# Patient Record
Sex: FEMALE | Race: WHITE | Employment: FULL TIME | URBAN - METROPOLITAN AREA
[De-identification: names, ages, dates, MRNs, and addresses within clinical notes are randomized per-mention and may not be internally consistent; named-entity substitution may affect disease eponyms.]

---

## 2017-03-10 ENCOUNTER — OFFICE VISIT (OUTPATIENT)
Dept: FAMILY MEDICINE CLINIC | Age: 42
End: 2017-03-10

## 2017-03-10 VITALS
HEIGHT: 66 IN | RESPIRATION RATE: 12 BRPM | WEIGHT: 170 LBS | SYSTOLIC BLOOD PRESSURE: 108 MMHG | BODY MASS INDEX: 27.32 KG/M2 | DIASTOLIC BLOOD PRESSURE: 74 MMHG | HEART RATE: 60 BPM

## 2017-03-10 DIAGNOSIS — F33.1 MODERATE EPISODE OF RECURRENT MAJOR DEPRESSIVE DISORDER (HCC): Primary | ICD-10-CM

## 2017-03-10 DIAGNOSIS — F41.9 ANXIETY: ICD-10-CM

## 2017-03-10 PROCEDURE — 99214 OFFICE O/P EST MOD 30 MIN: CPT | Performed by: FAMILY MEDICINE

## 2017-03-10 PROCEDURE — G0444 DEPRESSION SCREEN ANNUAL: HCPCS | Performed by: FAMILY MEDICINE

## 2017-03-10 RX ORDER — DIAZEPAM 2 MG/1
2 TABLET ORAL EVERY 8 HOURS PRN
Qty: 20 TABLET | Refills: 0 | Status: SHIPPED | OUTPATIENT
Start: 2017-03-10 | End: 2017-03-20

## 2017-03-10 ASSESSMENT — PATIENT HEALTH QUESTIONNAIRE - PHQ9
6. FEELING BAD ABOUT YOURSELF - OR THAT YOU ARE A FAILURE OR HAVE LET YOURSELF OR YOUR FAMILY DOWN: 3
8. MOVING OR SPEAKING SO SLOWLY THAT OTHER PEOPLE COULD HAVE NOTICED. OR THE OPPOSITE, BEING SO FIGETY OR RESTLESS THAT YOU HAVE BEEN MOVING AROUND A LOT MORE THAN USUAL: 3
10. IF YOU CHECKED OFF ANY PROBLEMS, HOW DIFFICULT HAVE THESE PROBLEMS MADE IT FOR YOU TO DO YOUR WORK, TAKE CARE OF THINGS AT HOME, OR GET ALONG WITH OTHER PEOPLE: 3
3. TROUBLE FALLING OR STAYING ASLEEP: 3
2. FEELING DOWN, DEPRESSED OR HOPELESS: 3
SUM OF ALL RESPONSES TO PHQ QUESTIONS 1-9: 25
7. TROUBLE CONCENTRATING ON THINGS, SUCH AS READING THE NEWSPAPER OR WATCHING TELEVISION: 3
5. POOR APPETITE OR OVEREATING: 3
4. FEELING TIRED OR HAVING LITTLE ENERGY: 3
SUM OF ALL RESPONSES TO PHQ9 QUESTIONS 1 & 2: 6
9. THOUGHTS THAT YOU WOULD BE BETTER OFF DEAD, OR OF HURTING YOURSELF: 1
1. LITTLE INTEREST OR PLEASURE IN DOING THINGS: 3

## 2017-04-10 ENCOUNTER — OFFICE VISIT (OUTPATIENT)
Dept: FAMILY MEDICINE CLINIC | Age: 42
End: 2017-04-10

## 2017-04-10 VITALS
HEIGHT: 66 IN | WEIGHT: 170 LBS | DIASTOLIC BLOOD PRESSURE: 66 MMHG | RESPIRATION RATE: 12 BRPM | HEART RATE: 70 BPM | SYSTOLIC BLOOD PRESSURE: 118 MMHG | BODY MASS INDEX: 27.32 KG/M2

## 2017-04-10 DIAGNOSIS — F33.1 MODERATE EPISODE OF RECURRENT MAJOR DEPRESSIVE DISORDER (HCC): ICD-10-CM

## 2017-04-10 DIAGNOSIS — F41.9 ANXIETY: ICD-10-CM

## 2017-04-10 PROCEDURE — 99213 OFFICE O/P EST LOW 20 MIN: CPT | Performed by: FAMILY MEDICINE

## 2017-05-22 ENCOUNTER — OFFICE VISIT (OUTPATIENT)
Dept: FAMILY MEDICINE CLINIC | Age: 42
End: 2017-05-22

## 2017-05-22 VITALS
RESPIRATION RATE: 16 BRPM | SYSTOLIC BLOOD PRESSURE: 108 MMHG | WEIGHT: 172 LBS | BODY MASS INDEX: 27.64 KG/M2 | HEIGHT: 66 IN | HEART RATE: 60 BPM | DIASTOLIC BLOOD PRESSURE: 52 MMHG

## 2017-05-22 DIAGNOSIS — N92.0 MENORRHAGIA WITH REGULAR CYCLE: ICD-10-CM

## 2017-05-22 DIAGNOSIS — Z01.818 PRE-OP EVALUATION: Primary | ICD-10-CM

## 2017-05-22 PROCEDURE — 99243 OFF/OP CNSLTJ NEW/EST LOW 30: CPT | Performed by: FAMILY MEDICINE

## 2017-05-22 PROCEDURE — 36415 COLL VENOUS BLD VENIPUNCTURE: CPT | Performed by: FAMILY MEDICINE

## 2017-05-23 ENCOUNTER — TELEPHONE (OUTPATIENT)
Dept: FAMILY MEDICINE CLINIC | Age: 42
End: 2017-05-23

## 2017-05-23 LAB
BASOPHILS ABSOLUTE: 0 K/UL (ref 0–0.2)
BASOPHILS RELATIVE PERCENT: 0.9 %
EOSINOPHILS ABSOLUTE: 0.2 K/UL (ref 0–0.6)
EOSINOPHILS RELATIVE PERCENT: 4.1 %
HCT VFR BLD CALC: 39.2 % (ref 36–48)
HEMOGLOBIN: 12.8 G/DL (ref 12–16)
LYMPHOCYTES ABSOLUTE: 2.1 K/UL (ref 1–5.1)
LYMPHOCYTES RELATIVE PERCENT: 36.1 %
MCH RBC QN AUTO: 30.1 PG (ref 26–34)
MCHC RBC AUTO-ENTMCNC: 32.8 G/DL (ref 31–36)
MCV RBC AUTO: 91.7 FL (ref 80–100)
MONOCYTES ABSOLUTE: 0.5 K/UL (ref 0–1.3)
MONOCYTES RELATIVE PERCENT: 8.1 %
NEUTROPHILS ABSOLUTE: 2.9 K/UL (ref 1.7–7.7)
NEUTROPHILS RELATIVE PERCENT: 50.8 %
PDW BLD-RTO: 13.7 % (ref 12.4–15.4)
PLATELET # BLD: 215 K/UL (ref 135–450)
PMV BLD AUTO: 9.9 FL (ref 5–10.5)
RBC # BLD: 4.27 M/UL (ref 4–5.2)
WBC # BLD: 5.7 K/UL (ref 4–11)

## 2017-06-23 DIAGNOSIS — F33.1 MODERATE EPISODE OF RECURRENT MAJOR DEPRESSIVE DISORDER (HCC): ICD-10-CM

## 2017-06-23 DIAGNOSIS — F41.9 ANXIETY: ICD-10-CM

## 2017-07-31 RX ORDER — MONTELUKAST SODIUM 10 MG/1
TABLET ORAL
Qty: 90 TABLET | Refills: 0 | Status: SHIPPED | OUTPATIENT
Start: 2017-07-31 | End: 2017-08-11 | Stop reason: SDUPTHER

## 2017-08-11 ENCOUNTER — OFFICE VISIT (OUTPATIENT)
Dept: FAMILY MEDICINE CLINIC | Age: 42
End: 2017-08-11

## 2017-08-11 VITALS
WEIGHT: 181 LBS | HEIGHT: 66 IN | BODY MASS INDEX: 29.09 KG/M2 | SYSTOLIC BLOOD PRESSURE: 100 MMHG | RESPIRATION RATE: 12 BRPM | DIASTOLIC BLOOD PRESSURE: 62 MMHG | HEART RATE: 66 BPM

## 2017-08-11 DIAGNOSIS — G47.33 OSA (OBSTRUCTIVE SLEEP APNEA): ICD-10-CM

## 2017-08-11 DIAGNOSIS — R63.5 WEIGHT GAIN: ICD-10-CM

## 2017-08-11 DIAGNOSIS — F41.9 ANXIETY: ICD-10-CM

## 2017-08-11 DIAGNOSIS — E66.3 OVER WEIGHT: Primary | ICD-10-CM

## 2017-08-11 PROCEDURE — 99214 OFFICE O/P EST MOD 30 MIN: CPT | Performed by: FAMILY MEDICINE

## 2017-08-11 RX ORDER — MONTELUKAST SODIUM 10 MG/1
TABLET ORAL
Qty: 90 TABLET | Refills: 1 | Status: SHIPPED | OUTPATIENT
Start: 2017-08-11 | End: 2018-01-08 | Stop reason: SDUPTHER

## 2017-08-16 ENCOUNTER — TELEPHONE (OUTPATIENT)
Dept: BARIATRICS/WEIGHT MGMT | Age: 42
End: 2017-08-16

## 2017-08-21 ENCOUNTER — TELEPHONE (OUTPATIENT)
Dept: SLEEP MEDICINE | Age: 42
End: 2017-08-21

## 2017-08-28 ENCOUNTER — OFFICE VISIT (OUTPATIENT)
Dept: SLEEP MEDICINE | Age: 42
End: 2017-08-28

## 2017-08-28 VITALS
SYSTOLIC BLOOD PRESSURE: 126 MMHG | WEIGHT: 189 LBS | BODY MASS INDEX: 30.37 KG/M2 | DIASTOLIC BLOOD PRESSURE: 80 MMHG | HEIGHT: 66 IN | OXYGEN SATURATION: 97 % | HEART RATE: 84 BPM

## 2017-08-28 DIAGNOSIS — G47.19 EXCESSIVE DAYTIME SLEEPINESS: Primary | ICD-10-CM

## 2017-08-28 DIAGNOSIS — G47.63 BRUXISM, SLEEP-RELATED: ICD-10-CM

## 2017-08-28 DIAGNOSIS — R06.83 SNORING: ICD-10-CM

## 2017-08-28 DIAGNOSIS — R06.89 GASPING FOR BREATH: ICD-10-CM

## 2017-08-28 PROCEDURE — 99204 OFFICE O/P NEW MOD 45 MIN: CPT | Performed by: PSYCHIATRY & NEUROLOGY

## 2017-08-28 ASSESSMENT — SLEEP AND FATIGUE QUESTIONNAIRES
HOW LIKELY ARE YOU TO NOD OFF OR FALL ASLEEP WHILE LYING DOWN TO REST IN THE AFTERNOON WHEN CIRCUMSTANCES PERMIT: 2
HOW LIKELY ARE YOU TO NOD OFF OR FALL ASLEEP WHILE SITTING INACTIVE IN A PUBLIC PLACE: 1
NECK CIRCUMFERENCE (INCHES): 13
HOW LIKELY ARE YOU TO NOD OFF OR FALL ASLEEP WHILE WATCHING TV: 3
HOW LIKELY ARE YOU TO NOD OFF OR FALL ASLEEP WHILE SITTING QUIETLY AFTER LUNCH WITHOUT ALCOHOL: 1
HOW LIKELY ARE YOU TO NOD OFF OR FALL ASLEEP WHILE SITTING AND READING: 3
HOW LIKELY ARE YOU TO NOD OFF OR FALL ASLEEP IN A CAR, WHILE STOPPED FOR A FEW MINUTES IN TRAFFIC: 0
HOW LIKELY ARE YOU TO NOD OFF OR FALL ASLEEP WHEN YOU ARE A PASSENGER IN A CAR FOR AN HOUR WITHOUT A BREAK: 2
ESS TOTAL SCORE: 13
HOW LIKELY ARE YOU TO NOD OFF OR FALL ASLEEP WHILE SITTING AND TALKING TO SOMEONE: 1

## 2017-08-28 ASSESSMENT — ENCOUNTER SYMPTOMS
ABDOMINAL DISTENTION: 1
COUGH: 1
SORE THROAT: 1
ALLERGIC/IMMUNOLOGIC NEGATIVE: 1
EYES NEGATIVE: 1
CHOKING: 1

## 2017-09-14 ENCOUNTER — OFFICE VISIT (OUTPATIENT)
Dept: FAMILY MEDICINE CLINIC | Age: 42
End: 2017-09-14

## 2017-09-14 VITALS
OXYGEN SATURATION: 98 % | DIASTOLIC BLOOD PRESSURE: 70 MMHG | SYSTOLIC BLOOD PRESSURE: 102 MMHG | RESPIRATION RATE: 12 BRPM | BODY MASS INDEX: 29.89 KG/M2 | HEART RATE: 77 BPM | HEIGHT: 66 IN | TEMPERATURE: 98.1 F | WEIGHT: 186 LBS

## 2017-09-14 DIAGNOSIS — R42 VERTIGO: ICD-10-CM

## 2017-09-14 DIAGNOSIS — J01.00 ACUTE MAXILLARY SINUSITIS, RECURRENCE NOT SPECIFIED: Primary | ICD-10-CM

## 2017-09-14 PROCEDURE — 99213 OFFICE O/P EST LOW 20 MIN: CPT | Performed by: INTERNAL MEDICINE

## 2017-09-14 RX ORDER — FLUTICASONE PROPIONATE 50 MCG
1 SPRAY, SUSPENSION (ML) NASAL DAILY
Qty: 1 BOTTLE | Refills: 3 | Status: SHIPPED | OUTPATIENT
Start: 2017-09-14 | End: 2018-11-20 | Stop reason: SDUPTHER

## 2017-09-14 RX ORDER — AZITHROMYCIN 250 MG/1
TABLET, FILM COATED ORAL
Qty: 1 PACKET | Refills: 0 | Status: SHIPPED | OUTPATIENT
Start: 2017-09-14 | End: 2017-09-24

## 2017-09-14 RX ORDER — ONDANSETRON 4 MG/1
4 TABLET, FILM COATED ORAL EVERY 8 HOURS PRN
Qty: 30 TABLET | Refills: 0 | Status: SHIPPED | OUTPATIENT
Start: 2017-09-14 | End: 2021-01-06

## 2017-10-04 ENCOUNTER — HOSPITAL ENCOUNTER (OUTPATIENT)
Dept: SLEEP MEDICINE | Age: 42
Discharge: HOME | End: 2017-10-06
Attending: PSYCHIATRY & NEUROLOGY | Admitting: PSYCHIATRY & NEUROLOGY

## 2017-10-04 DIAGNOSIS — R06.89 GASPING FOR BREATH: ICD-10-CM

## 2017-10-04 DIAGNOSIS — R06.83 SNORING: ICD-10-CM

## 2017-10-04 DIAGNOSIS — G47.19 EXCESSIVE DAYTIME SLEEPINESS: ICD-10-CM

## 2017-10-09 ENCOUNTER — TELEPHONE (OUTPATIENT)
Dept: PULMONOLOGY | Age: 42
End: 2017-10-09

## 2017-10-09 DIAGNOSIS — G47.19 EXCESSIVE DAYTIME SLEEPINESS: Primary | ICD-10-CM

## 2017-10-11 NOTE — TELEPHONE ENCOUNTER
Patient returned call, gave her the results of the HST and Dr. Hale Bloch message. She says that she will do PSG. Please call patient back when order is put in.

## 2017-11-30 ENCOUNTER — OFFICE VISIT (OUTPATIENT)
Dept: FAMILY MEDICINE CLINIC | Age: 42
End: 2017-11-30

## 2017-11-30 VITALS
HEART RATE: 86 BPM | HEIGHT: 66 IN | DIASTOLIC BLOOD PRESSURE: 68 MMHG | OXYGEN SATURATION: 98 % | WEIGHT: 189 LBS | BODY MASS INDEX: 30.37 KG/M2 | SYSTOLIC BLOOD PRESSURE: 110 MMHG | RESPIRATION RATE: 12 BRPM

## 2017-11-30 DIAGNOSIS — Z86.010 HX OF ADENOMATOUS POLYP OF COLON: ICD-10-CM

## 2017-11-30 DIAGNOSIS — R42 VERTIGO: Primary | ICD-10-CM

## 2017-11-30 DIAGNOSIS — Z23 NEED FOR INFLUENZA VACCINATION: ICD-10-CM

## 2017-11-30 PROBLEM — Z86.0101 HX OF ADENOMATOUS POLYP OF COLON: Status: ACTIVE | Noted: 2017-11-30

## 2017-11-30 PROCEDURE — 90688 IIV4 VACCINE SPLT 0.5 ML IM: CPT | Performed by: INTERNAL MEDICINE

## 2017-11-30 PROCEDURE — 90471 IMMUNIZATION ADMIN: CPT | Performed by: INTERNAL MEDICINE

## 2017-11-30 PROCEDURE — 99213 OFFICE O/P EST LOW 20 MIN: CPT | Performed by: INTERNAL MEDICINE

## 2017-11-30 NOTE — PROGRESS NOTES
HPI: Primo Apodaca presents for vertigo. States onset of vertigo 11 years ago. His had recurrent episodes over the years. Last one began yesterday in exercise class. She has sudden onset of spinning with nausea. No worrying. No family history of Ménière's disease. She denies any headaches or migraines. Is not had ENT evaluation in the past. Has been using vertigo exercises. Started having motion sickness in the car. She is currently using Zofran for nausea has Benadryl as well. Using Flonase. No head trauma. History of sinusitis. States that her first episode happened after this. Increased weight. Says she is exercising following diet is not losing weight interested in going to the weight loss clinic. History of adenomatous polyp. Follow-up in the next couple of years for repeat colonoscopy. Mother had colon cancer in her early 46s. Hx oop. Is currently on some estrogen testosterone replacement    Weight gain and interested in clinic    Past medical history, laparoscopic surgery, breast augmentation, tubal ligation, unilateral oophorectomy and tubal removal.    Social history: , no tobacco, positive alcohol. No drugs. Family history colon cancer in mother.  Diabetes    Of systems: Anxiety, sinusitis, somnolence with negative sleep study, palpitations, ,        12 point ROS reviewed and negative other than mentioned above  Allergies   Allergen Reactions    Pcn [Penicillins] Rash    Cephalosporins Hives    Wellbutrin [Bupropion]        Outpatient Prescriptions Marked as Taking for the 11/30/17 encounter (Office Visit) with Bladimir Rendon MD   Medication Sig Dispense Refill    ondansetron (ZOFRAN) 4 MG tablet Take 1 tablet by mouth every 8 hours as needed for Nausea or Vomiting 30 tablet 0    fluticasone (FLONASE) 50 MCG/ACT nasal spray 1 spray by Nasal route daily 1 Bottle 3    montelukast (SINGULAIR) 10 MG tablet TAKE 1 TABLET BY MOUTH DAILY 90 tablet 1    diphenhydrAMINE (BENADRYL) 25 MG capsule Take 25 mg by mouth every 6 hours as needed for Itching               Past Medical History:   Diagnosis Date    Anxiety     Kidney stone     MDD (major depressive disorder)     has been on zoloft, paxil, celexa. (had severe HAs, and nausea on wellbutrin)    Seasonal allergies        Past Surgical History:   Procedure Laterality Date    ABDOMINAL EXPLORATION SURGERY      BREAST ENHANCEMENT SURGERY      TUBAL LIGATION           Social History     Social History    Marital status:      Spouse name: N/A    Number of children: N/A    Years of education: N/A     Occupational History    Not on file. Social History Main Topics    Smoking status: Never Smoker    Smokeless tobacco: Never Used    Alcohol use 0.0 oz/week      Comment: socially (a couple glasses on the week)    Drug use: No    Sexual activity: Yes     Partners: Male      Comment: , 2 children     Other Topics Concern    Not on file     Social History Narrative    Caffeine:        SODA - 0          TEA - 0        COFFEE - 1 cup in the morning           Family History   Problem Relation Age of Onset    Colon Cancer Mother     Diabetes Father     Diabetes Maternal Aunt     Diabetes Maternal Grandmother     Heart Disease Paternal Grandmother     Diabetes Maternal Aunt     Diabetes Maternal Aunt     Diabetes Maternal Aunt     Diabetes Maternal Aunt     Diabetes Maternal Aunt            Review of Systems          Objective     /68 Comment: standing  Pulse 88   Resp 12   Ht 5' 6\" (1.676 m)   Wt 189 lb (85.7 kg)   SpO2 98% Comment: RA  BMI 30.51 kg/m²     @LASTSAO2(3)@    Wt Readings from Last 3 Encounters:   11/30/17 189 lb (85.7 kg)   09/14/17 186 lb (84.4 kg)   08/28/17 189 lb (85.7 kg)       Physical Exam     NAD alert and cooperative   HEENT: [place] unremarkable [place] in [place] are normal. Throat is clear. No sinus tenderness. Positive nystagmus left.  No carotid bruits  Lungs are clear no wheezes rales or rhonchi  Cardiovascular exam regular rate and rhythm without murmur  No difficulty with walking. Chemistry        Component Value Date/Time     03/31/2016 0915    K 4.0 03/31/2016 0915     03/31/2016 0915    CO2 25 03/31/2016 0915    BUN 12 03/31/2016 0915    CREATININE 0.7 03/31/2016 0915        Component Value Date/Time    CALCIUM 9.0 03/31/2016 0915    ALKPHOS 68 03/31/2016 0915    AST 12 (L) 03/31/2016 0915    ALT <5 (L) 03/31/2016 0915    BILITOT 0.4 03/31/2016 0915            Lab Results   Component Value Date    WBC 5.7 05/22/2017    HGB 12.8 05/22/2017    HCT 39.2 05/22/2017    MCV 91.7 05/22/2017     05/22/2017     No results found for: LABA1C  No results found for: EAG  No results found for: LABA1C  No components found for: CHLPL  No results found for: TRIG  No results found for: HDL  No results found for: LDLCALC  No results found for: LABVLDL    Old labs and records reviewed or requested  Discussed past lab and studies with patient     1. Estrella Camilo MD ([facility])   2. Hx of adenomatous polyp of colon     3. BMI 30.0-30.9,adult  Phil Liu MD   4. Need for influenza vaccination  INFLUENZA, QUADV, 3 YRS AND OLDER, IM, MDV, 0.5ML (FLUZONE QUADV)     Recurrent vertigo without obvious hearing loss. We'll refer to ENT has requested. History of adenomatous polyps and family history of early colon cancer. Colonoscopy in a year and a half    Increasing BMI. Once to follow-up with weight loss clinic. Referral given    Health maintenance. She has had recent Pap and is interested in having flu vaccine knows that I would recommend having a lipid profile sometime in the future              Diagnosis and treatment discussed.   Possible side effects of medication reviewed  Patients questions answered  Follow up understood  Pt aware if they are not contacted about any test results , this does not mean they are normal.  They should call

## 2017-11-30 NOTE — PATIENT INSTRUCTIONS
Patient Education        Vertigo: Care Instructions  Your Care Instructions  Vertigo is the feeling that you or your surroundings are moving when there is no actual movement. It is often described as a feeling of spinning, whirling, falling, or tilting. Vertigo may make you vomit or feel nauseated. You may have trouble standing or walking and may lose your balance. Vertigo is often related to an inner ear problem, but it can have other more serious causes. If vertigo continues, you may need more tests to find its cause. Follow-up care is a key part of your treatment and safety. Be sure to make and go to all appointments, and call your doctor if you are having problems. Its also a good idea to know your test results and keep a list of the medicines you take. How can you care for yourself at home? · Do not lie flat on your back. Prop yourself up slightly. This may reduce the spinning feeling. Keep your eyes open. · Move slowly so that you do not fall. · If your doctor recommends medicine, take it exactly as directed. · Do not drive while you are having vertigo. Certain exercises, called Andrew-Daroff exercises, can help decrease vertigo. To do Andrew-Daroff exercises:  · Sit on the edge of a bed or sofa and quickly lie down on the side that causes the worst vertigo. Lie on your side with your ear down. · Stay in this position for at least 30 seconds or until the vertigo goes away. · Sit up. If this causes vertigo, wait for it to stop. · Repeat the procedure on the other side. · Repeat this 10 times. Do these exercises 2 times a day until the vertigo is gone. When should you call for help? Call 911 anytime you think you may need emergency care. For example, call if:  · You passed out (lost consciousness). · You have symptoms of a stroke. These may include:  ¨ Sudden numbness, tingling, weakness, or loss of movement in your face, arm, or leg, especially on only one side of your body.   ¨ Sudden vision changes. ¨ Sudden trouble speaking. ¨ Sudden confusion or trouble understanding simple statements. ¨ Sudden problems with walking or balance. ¨ A sudden, severe headache that is different from past headaches. Call your doctor now or seek immediate medical care if:  · Vertigo occurs with a fever, a headache, or ringing in your ears. · You have new or increased nausea and vomiting. Watch closely for changes in your health, and be sure to contact your doctor if:  · Vertigo gets worse or happens more often. · Vertigo has not gotten better after 2 weeks. Where can you learn more? Go to https://chpepiceweb."[employer]". org and sign in to your [employer] account. Enter E226 in the [employer] box to learn more about \"Vertigo: Care Instructions. \"     If you do not have an account, please click on the \"Sign Up Now\" link. Current as of: July 29, 2016  Content Version: 11.3  © 9420-3370 [employer]. Care instructions adapted under license by [facility] ([facility]). If you have questions about a medical condition or this instruction, always ask your healthcare professional. [place] [address] any warranty or liability for your use of this information. Patient Education        Starting a Weight Loss Plan: Care Instructions  Your Care Instructions  If you are thinking about losing weight, it can be hard to know where to start. Your doctor can help you set up a weight loss plan that best meets your needs. You may want to take a class on nutrition or exercise, or join a weight loss support group. If you have questions about how to make changes to your eating or exercise habits, ask your doctor about seeing a registered dietitian or an exercise specialist.  It can be a big challenge to lose weight. But you do not have to make huge changes at once. Make small changes, and stick with them. When those changes become habit, add a few more changes.   If you do not think you are ready to make changes right now, try to pick a date in the future. Make an appointment to see your doctor to discuss whether the time is right for you to start a plan. Follow-up care is a key part of your treatment and safety. Be sure to make and go to all appointments, and call your doctor if you are having problems. Its also a good idea to know your test results and keep a list of the medicines you take. How can you care for yourself at home? · Set realistic goals. Many people expect to lose much more weight than is likely. A weight loss of 5% to 10% of your body weight may be enough to improve your health. · Get family and friends involved to provide support. Talk to them about why you are trying to lose weight, and ask them to help. They can help by participating in exercise and having meals with you, even if they may be eating something different. · Find what works best for you. If you do not have time or do not like to cook, a program that offers meal replacement bars or shakes may be better for you. Or if you like to prepare meals, finding a plan that includes daily menus and recipes may be best.  · Ask your doctor about other health professionals who can help you achieve your weight loss goals. ¨ A dietitian can help you make healthy changes in your diet. ¨ An exercise specialist or  can help you develop a safe and effective exercise program.  ¨ A counselor or psychiatrist can help you cope with issues such as depression, anxiety, or family problems that can make it hard to focus on weight loss. · Consider joining a support group for people who are trying to lose weight. Your doctor can suggest groups in your area. Where can you learn more? Go to https://anastasiia.[employer]. org and sign in to your [facility] account. Enter A042 in the [facility] box to learn more about \"Starting a Weight Loss Plan: Care Instructions. \"     If you do not have an account, please click on

## 2017-12-07 ENCOUNTER — OFFICE VISIT (OUTPATIENT)
Dept: PSYCHOLOGY | Age: 42
End: 2017-12-07

## 2017-12-07 DIAGNOSIS — F41.9 ANXIETY: Primary | ICD-10-CM

## 2017-12-07 DIAGNOSIS — Z13.220 SCREENING, LIPID: Primary | ICD-10-CM

## 2017-12-07 PROCEDURE — 90791 PSYCH DIAGNOSTIC EVALUATION: CPT | Performed by: PSYCHOLOGIST

## 2017-12-07 ASSESSMENT — PATIENT HEALTH QUESTIONNAIRE - PHQ9
10. IF YOU CHECKED OFF ANY PROBLEMS, HOW DIFFICULT HAVE THESE PROBLEMS MADE IT FOR YOU TO DO YOUR WORK, TAKE CARE OF THINGS AT HOME, OR GET ALONG WITH OTHER PEOPLE: 3
6. FEELING BAD ABOUT YOURSELF - OR THAT YOU ARE A FAILURE OR HAVE LET YOURSELF OR YOUR FAMILY DOWN: 3
3. TROUBLE FALLING OR STAYING ASLEEP: 2
SUM OF ALL RESPONSES TO PHQ QUESTIONS 1-9: 19
2. FEELING DOWN, DEPRESSED OR HOPELESS: 3
8. MOVING OR SPEAKING SO SLOWLY THAT OTHER PEOPLE COULD HAVE NOTICED. OR THE OPPOSITE, BEING SO FIGETY OR RESTLESS THAT YOU HAVE BEEN MOVING AROUND A LOT MORE THAN USUAL: 3
9. THOUGHTS THAT YOU WOULD BE BETTER OFF DEAD, OR OF HURTING YOURSELF: 1
7. TROUBLE CONCENTRATING ON THINGS, SUCH AS READING THE NEWSPAPER OR WATCHING TELEVISION: 3
4. FEELING TIRED OR HAVING LITTLE ENERGY: 2
1. LITTLE INTEREST OR PLEASURE IN DOING THINGS: 1
5. POOR APPETITE OR OVEREATING: 1
SUM OF ALL RESPONSES TO PHQ9 QUESTIONS 1 & 2: 4

## 2017-12-07 ASSESSMENT — ANXIETY QUESTIONNAIRES
1. FEELING NERVOUS, ANXIOUS, OR ON EDGE: 2-OVER HALF THE DAYS
2. NOT BEING ABLE TO STOP OR CONTROL WORRYING: 3-NEARLY EVERY DAY
7. FEELING AFRAID AS IF SOMETHING AWFUL MIGHT HAPPEN: 2-OVER HALF THE DAYS
4. TROUBLE RELAXING: 2-OVER HALF THE DAYS
GAD7 TOTAL SCORE: 15
3. WORRYING TOO MUCH ABOUT DIFFERENT THINGS: 3-NEARLY EVERY DAY
6. BECOMING EASILY ANNOYED OR IRRITABLE: 2-OVER HALF THE DAYS
5. BEING SO RESTLESS THAT IT IS HARD TO SIT STILL: 1-SEVERAL DAYS
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
## 2017-12-18 ENCOUNTER — HOSPITAL ENCOUNTER (OUTPATIENT)
Dept: OTHER | Age: 42
Discharge: HOME | End: 2017-12-20
Attending: PSYCHIATRY & NEUROLOGY | Admitting: PSYCHIATRY & NEUROLOGY

## 2017-12-18 DIAGNOSIS — R06.89 GASPING FOR BREATH: ICD-10-CM

## 2017-12-18 DIAGNOSIS — G47.19 EXCESSIVE DAYTIME SLEEPINESS: ICD-10-CM

## 2017-12-18 DIAGNOSIS — R06.83 SNORING: ICD-10-CM

## 2017-12-18 DIAGNOSIS — G47.63 BRUXISM, SLEEP-RELATED: ICD-10-CM

## 2017-12-19 PROCEDURE — 95810 POLYSOM 6/> YRS 4/> PARAM: CPT | Performed by: PSYCHIATRY & NEUROLOGY

## 2017-12-21 ENCOUNTER — TELEPHONE (OUTPATIENT)
Dept: PULMONOLOGY | Age: 42
End: 2017-12-21

## 2017-12-21 NOTE — TELEPHONE ENCOUNTER
LORENM to call back for PSG results. Study showed no significant sleep disorder - negative for JAQUELINE.   No follow up needed unless she wants to discuss anything further with Dr Fabienne Rowe

## 2017-12-27 ENCOUNTER — OFFICE VISIT (OUTPATIENT)
Dept: PSYCHOLOGY | Age: 42
End: 2017-12-27

## 2017-12-27 DIAGNOSIS — F43.10 PTSD (POST-TRAUMATIC STRESS DISORDER): Primary | ICD-10-CM

## 2017-12-27 PROCEDURE — 90832 PSYTX W PT 30 MINUTES: CPT | Performed by: PSYCHOLOGIST

## 2017-12-27 ASSESSMENT — ANXIETY QUESTIONNAIRES
GAD7 TOTAL SCORE: 12
5. BEING SO RESTLESS THAT IT IS HARD TO SIT STILL: 1-SEVERAL DAYS
2. NOT BEING ABLE TO STOP OR CONTROL WORRYING: 2-OVER HALF THE DAYS
4. TROUBLE RELAXING: 2-OVER HALF THE DAYS
3. WORRYING TOO MUCH ABOUT DIFFERENT THINGS: 2-OVER HALF THE DAYS
7. FEELING AFRAID AS IF SOMETHING AWFUL MIGHT HAPPEN: 1-SEVERAL DAYS
6. BECOMING EASILY ANNOYED OR IRRITABLE: 2-OVER HALF THE DAYS
1. FEELING NERVOUS, ANXIOUS, OR ON EDGE: 2-OVER HALF THE DAYS

## 2017-12-27 ASSESSMENT — PATIENT HEALTH QUESTIONNAIRE - PHQ9
1. LITTLE INTEREST OR PLEASURE IN DOING THINGS: 1
8. MOVING OR SPEAKING SO SLOWLY THAT OTHER PEOPLE COULD HAVE NOTICED. OR THE OPPOSITE, BEING SO FIGETY OR RESTLESS THAT YOU HAVE BEEN MOVING AROUND A LOT MORE THAN USUAL: 1
5. POOR APPETITE OR OVEREATING: 2
2. FEELING DOWN, DEPRESSED OR HOPELESS: 2
SUM OF ALL RESPONSES TO PHQ9 QUESTIONS 1 & 2: 3
3. TROUBLE FALLING OR STAYING ASLEEP: 2
6. FEELING BAD ABOUT YOURSELF - OR THAT YOU ARE A FAILURE OR HAVE LET YOURSELF OR YOUR FAMILY DOWN: 2
SUM OF ALL RESPONSES TO PHQ QUESTIONS 1-9: 15
10. IF YOU CHECKED OFF ANY PROBLEMS, HOW DIFFICULT HAVE THESE PROBLEMS MADE IT FOR YOU TO DO YOUR WORK, TAKE CARE OF THINGS AT HOME, OR GET ALONG WITH OTHER PEOPLE: 1
4. FEELING TIRED OR HAVING LITTLE ENERGY: 2
7. TROUBLE CONCENTRATING ON THINGS, SUCH AS READING THE NEWSPAPER OR WATCHING TELEVISION: 3
9. THOUGHTS THAT YOU WOULD BE BETTER OFF DEAD, OR OF HURTING YOURSELF: 0

## 2018-01-08 ENCOUNTER — OFFICE VISIT (OUTPATIENT)
Dept: FAMILY MEDICINE CLINIC | Age: 43
End: 2018-01-08

## 2018-01-08 VITALS
RESPIRATION RATE: 12 BRPM | SYSTOLIC BLOOD PRESSURE: 112 MMHG | BODY MASS INDEX: 30.7 KG/M2 | HEART RATE: 62 BPM | HEIGHT: 66 IN | WEIGHT: 191 LBS | DIASTOLIC BLOOD PRESSURE: 60 MMHG

## 2018-01-08 DIAGNOSIS — F90.2 ATTENTION DEFICIT HYPERACTIVITY DISORDER (ADHD), COMBINED TYPE: ICD-10-CM

## 2018-01-08 PROCEDURE — 99214 OFFICE O/P EST MOD 30 MIN: CPT | Performed by: FAMILY MEDICINE

## 2018-01-08 RX ORDER — MONTELUKAST SODIUM 10 MG/1
TABLET ORAL
Qty: 90 TABLET | Refills: 1 | Status: SHIPPED | OUTPATIENT
Start: 2018-01-08 | End: 2018-04-23 | Stop reason: SDUPTHER

## 2018-01-08 RX ORDER — DEXTROAMPHETAMINE SACCHARATE, AMPHETAMINE ASPARTATE, DEXTROAMPHETAMINE SULFATE AND AMPHETAMINE SULFATE 1.25; 1.25; 1.25; 1.25 MG/1; MG/1; MG/1; MG/1
5 TABLET ORAL 2 TIMES DAILY
Qty: 60 TABLET | Refills: 0 | Status: SHIPPED | OUTPATIENT
Start: 2018-01-08 | End: 2018-02-12 | Stop reason: SDUPTHER

## 2018-01-24 ENCOUNTER — OFFICE VISIT (OUTPATIENT)
Dept: PSYCHOLOGY | Age: 43
End: 2018-01-24

## 2018-01-24 DIAGNOSIS — F41.9 ANXIETY: ICD-10-CM

## 2018-01-24 DIAGNOSIS — F90.2 ADHD (ATTENTION DEFICIT HYPERACTIVITY DISORDER), COMBINED TYPE: Primary | ICD-10-CM

## 2018-01-24 PROCEDURE — 90832 PSYTX W PT 30 MINUTES: CPT | Performed by: PSYCHOLOGIST

## 2018-01-24 ASSESSMENT — ANXIETY QUESTIONNAIRES
2. NOT BEING ABLE TO STOP OR CONTROL WORRYING: 1-SEVERAL DAYS
6. BECOMING EASILY ANNOYED OR IRRITABLE: 1-SEVERAL DAYS
1. FEELING NERVOUS, ANXIOUS, OR ON EDGE: 1-SEVERAL DAYS
5. BEING SO RESTLESS THAT IT IS HARD TO SIT STILL: 1-SEVERAL DAYS
7. FEELING AFRAID AS IF SOMETHING AWFUL MIGHT HAPPEN: 1-SEVERAL DAYS
3. WORRYING TOO MUCH ABOUT DIFFERENT THINGS: 1-SEVERAL DAYS
4. TROUBLE RELAXING: 1-SEVERAL DAYS
GAD7 TOTAL SCORE: 7

## 2018-01-24 ASSESSMENT — PATIENT HEALTH QUESTIONNAIRE - PHQ9
1. LITTLE INTEREST OR PLEASURE IN DOING THINGS: 1
10. IF YOU CHECKED OFF ANY PROBLEMS, HOW DIFFICULT HAVE THESE PROBLEMS MADE IT FOR YOU TO DO YOUR WORK, TAKE CARE OF THINGS AT HOME, OR GET ALONG WITH OTHER PEOPLE: 1
4. FEELING TIRED OR HAVING LITTLE ENERGY: 1
8. MOVING OR SPEAKING SO SLOWLY THAT OTHER PEOPLE COULD HAVE NOTICED. OR THE OPPOSITE, BEING SO FIGETY OR RESTLESS THAT YOU HAVE BEEN MOVING AROUND A LOT MORE THAN USUAL: 1
SUM OF ALL RESPONSES TO PHQ QUESTIONS 1-9: 10
5. POOR APPETITE OR OVEREATING: 1
SUM OF ALL RESPONSES TO PHQ9 QUESTIONS 1 & 2: 3
3. TROUBLE FALLING OR STAYING ASLEEP: 1
9. THOUGHTS THAT YOU WOULD BE BETTER OFF DEAD, OR OF HURTING YOURSELF: 0
2. FEELING DOWN, DEPRESSED OR HOPELESS: 2
7. TROUBLE CONCENTRATING ON THINGS, SUCH AS READING THE NEWSPAPER OR WATCHING TELEVISION: 1
6. FEELING BAD ABOUT YOURSELF - OR THAT YOU ARE A FAILURE OR HAVE LET YOURSELF OR YOUR FAMILY DOWN: 2

## 2018-01-24 NOTE — PROGRESS NOTES
Behavioral Health Consultation Follow-up  Lili Saavedra PsyD  Psychologist  1/24/2018  10:55 AM      Time spent with Patient: 30 minutes  This is patient's third  [facility] appointment. Reason for Consult:  ADHD, combined; anxiety  Referring Provider: Rosalba Segal MD  [address] [place] [address], [facility] 50    Feedback given to PCP. S:    Last appt: 12/7/17. Met with PCP on 1/8 to discuss ADHD treatment. Had made mistake of starting with 1/2 dose, just started to taking full dose this past week. Feels like it is helping but will monitor over the next few weeks. Other coping skills to reduce stress: been taking [place] classes 3 x per week consistently, using supportive self-talk and coaching herself, giving self permission to struggle with anxiety at work    Rest of appt focused on more discussion about referral for longer term psychotherapy. Provided psycho-ed about the 3 types of therapy that could support trauma related anxiety which included supportive, CBT based, and trauma processing. Pt felt that if she would pursue any treatment most likely would want to start with CBT based then move towards trauma processing. Pt doesn't feel ready for treatment right now especially being in the midst of work with her building still being renovated and other projects on the table. Going to take a supportive consult approach until pt ready to move on to more aggressive therapy treatment. I will continue to bridge services until this is in place. Pt feels she would be ready to think about more treatment as we approach summer. F/u with me in 6 weeks or sooner if needed. We will re-assess readiness for referral at that time.      O:  MSE:    Appearance    alert, cooperative  Appetite normal  Sleep disturbance better  Fatigue better  Loss of pleasure better  Impulsive behavior No  Speech    normal rate, normal volume and well articulated  Mood    Stress levels down  Affect    Congruent with full

## 2018-02-07 ENCOUNTER — TELEPHONE (OUTPATIENT)
Dept: BARIATRICS/WEIGHT MGMT | Age: 43
End: 2018-02-07

## 2018-02-12 ENCOUNTER — TELEPHONE (OUTPATIENT)
Dept: FAMILY MEDICINE CLINIC | Age: 43
End: 2018-02-12

## 2018-02-12 DIAGNOSIS — F90.2 ATTENTION DEFICIT HYPERACTIVITY DISORDER (ADHD), COMBINED TYPE: ICD-10-CM

## 2018-02-12 RX ORDER — DIPHENHYDRAMINE HCL 25 MG
25 CAPSULE ORAL EVERY 6 HOURS PRN
Status: CANCELLED | OUTPATIENT
Start: 2018-02-12

## 2018-02-12 RX ORDER — DEXTROAMPHETAMINE SACCHARATE, AMPHETAMINE ASPARTATE, DEXTROAMPHETAMINE SULFATE AND AMPHETAMINE SULFATE 1.25; 1.25; 1.25; 1.25 MG/1; MG/1; MG/1; MG/1
5 TABLET ORAL 2 TIMES DAILY
Qty: 60 TABLET | Refills: 0 | Status: SHIPPED | OUTPATIENT
Start: 2018-02-12 | End: 2018-03-12 | Stop reason: SDUPTHER

## 2018-02-19 ENCOUNTER — OFFICE VISIT (OUTPATIENT)
Dept: FAMILY MEDICINE CLINIC | Age: 43
End: 2018-02-19

## 2018-02-19 VITALS
WEIGHT: 190 LBS | SYSTOLIC BLOOD PRESSURE: 116 MMHG | DIASTOLIC BLOOD PRESSURE: 77 MMHG | HEIGHT: 66 IN | BODY MASS INDEX: 30.53 KG/M2 | HEART RATE: 83 BPM

## 2018-02-19 DIAGNOSIS — F33.1 MODERATE EPISODE OF RECURRENT MAJOR DEPRESSIVE DISORDER (HCC): Primary | ICD-10-CM

## 2018-02-19 DIAGNOSIS — F90.2 ATTENTION DEFICIT HYPERACTIVITY DISORDER (ADHD), COMBINED TYPE: ICD-10-CM

## 2018-02-19 PROCEDURE — 99213 OFFICE O/P EST LOW 20 MIN: CPT | Performed by: FAMILY MEDICINE

## 2018-02-19 RX ORDER — FLUOXETINE 10 MG/1
10 CAPSULE ORAL DAILY
Qty: 30 CAPSULE | Refills: 0 | Status: SHIPPED | OUTPATIENT
Start: 2018-02-19 | End: 2018-06-26 | Stop reason: ALTCHOICE

## 2018-03-12 DIAGNOSIS — F90.2 ATTENTION DEFICIT HYPERACTIVITY DISORDER (ADHD), COMBINED TYPE: ICD-10-CM

## 2018-03-12 RX ORDER — DEXTROAMPHETAMINE SACCHARATE, AMPHETAMINE ASPARTATE, DEXTROAMPHETAMINE SULFATE AND AMPHETAMINE SULFATE 1.25; 1.25; 1.25; 1.25 MG/1; MG/1; MG/1; MG/1
5 TABLET ORAL 2 TIMES DAILY
Qty: 60 TABLET | Refills: 0 | OUTPATIENT
Start: 2018-03-12 | End: 2018-04-11

## 2018-03-12 RX ORDER — DEXTROAMPHETAMINE SACCHARATE, AMPHETAMINE ASPARTATE, DEXTROAMPHETAMINE SULFATE AND AMPHETAMINE SULFATE 1.25; 1.25; 1.25; 1.25 MG/1; MG/1; MG/1; MG/1
7.5 TABLET ORAL 2 TIMES DAILY
Qty: 90 TABLET | Refills: 0 | Status: SHIPPED | OUTPATIENT
Start: 2018-03-12 | End: 2018-04-23 | Stop reason: SDUPTHER

## 2018-04-23 DIAGNOSIS — F90.2 ATTENTION DEFICIT HYPERACTIVITY DISORDER (ADHD), COMBINED TYPE: ICD-10-CM

## 2018-04-23 RX ORDER — DEXTROAMPHETAMINE SACCHARATE, AMPHETAMINE ASPARTATE, DEXTROAMPHETAMINE SULFATE AND AMPHETAMINE SULFATE 1.25; 1.25; 1.25; 1.25 MG/1; MG/1; MG/1; MG/1
7.5 TABLET ORAL 2 TIMES DAILY
Qty: 90 TABLET | Refills: 0 | Status: SHIPPED | OUTPATIENT
Start: 2018-04-23 | End: 2018-06-26 | Stop reason: SDUPTHER

## 2018-04-23 RX ORDER — MONTELUKAST SODIUM 10 MG/1
TABLET ORAL
Qty: 90 TABLET | Refills: 1 | Status: SHIPPED | OUTPATIENT
Start: 2018-04-23 | End: 2018-12-03 | Stop reason: SDUPTHER

## 2018-06-26 ENCOUNTER — OFFICE VISIT (OUTPATIENT)
Dept: FAMILY MEDICINE CLINIC | Age: 43
End: 2018-06-26

## 2018-06-26 VITALS
DIASTOLIC BLOOD PRESSURE: 74 MMHG | HEIGHT: 66 IN | SYSTOLIC BLOOD PRESSURE: 105 MMHG | BODY MASS INDEX: 29.41 KG/M2 | HEART RATE: 83 BPM | WEIGHT: 183 LBS

## 2018-06-26 DIAGNOSIS — Z13.220 SCREENING FOR HYPERLIPIDEMIA: ICD-10-CM

## 2018-06-26 DIAGNOSIS — Z13.1 SCREENING FOR DIABETES MELLITUS: ICD-10-CM

## 2018-06-26 DIAGNOSIS — F90.2 ATTENTION DEFICIT HYPERACTIVITY DISORDER (ADHD), COMBINED TYPE: Primary | ICD-10-CM

## 2018-06-26 PROCEDURE — 99213 OFFICE O/P EST LOW 20 MIN: CPT | Performed by: FAMILY MEDICINE

## 2018-06-26 RX ORDER — DEXTROAMPHETAMINE SACCHARATE, AMPHETAMINE ASPARTATE, DEXTROAMPHETAMINE SULFATE AND AMPHETAMINE SULFATE 1.25; 1.25; 1.25; 1.25 MG/1; MG/1; MG/1; MG/1
7.5 TABLET ORAL 2 TIMES DAILY
Qty: 90 TABLET | Refills: 0 | Status: SHIPPED | OUTPATIENT
Start: 2018-06-26 | End: 2018-08-15 | Stop reason: SDUPTHER

## 2018-08-03 ENCOUNTER — TELEPHONE (OUTPATIENT)
Dept: FAMILY MEDICINE CLINIC | Age: 43
End: 2018-08-03

## 2018-08-15 ENCOUNTER — TELEPHONE (OUTPATIENT)
Dept: FAMILY MEDICINE CLINIC | Age: 43
End: 2018-08-15

## 2018-08-15 DIAGNOSIS — F90.2 ATTENTION DEFICIT HYPERACTIVITY DISORDER (ADHD), COMBINED TYPE: ICD-10-CM

## 2018-08-15 RX ORDER — DEXTROAMPHETAMINE SACCHARATE, AMPHETAMINE ASPARTATE, DEXTROAMPHETAMINE SULFATE AND AMPHETAMINE SULFATE 1.25; 1.25; 1.25; 1.25 MG/1; MG/1; MG/1; MG/1
7.5 TABLET ORAL 2 TIMES DAILY
Qty: 90 TABLET | Refills: 0 | Status: SHIPPED | OUTPATIENT
Start: 2018-08-15 | End: 2018-09-21 | Stop reason: SDUPTHER

## 2018-08-15 NOTE — TELEPHONE ENCOUNTER
Patient requesting a medication refill.   Medication  amphetamine-dextroamphetamine (ADDERALL, 5MG,)   Dosage  5 MG tablet   Frequency Take 1.5 tablets by mouth 2 times daily for 30 days  Last filled on  06/26/18  Pharmacy [facility] [address] [place] [address] [address] [phone]

## 2018-09-10 ENCOUNTER — OFFICE VISIT (OUTPATIENT)
Dept: FAMILY MEDICINE CLINIC | Age: 43
End: 2018-09-10

## 2018-09-10 VITALS
HEIGHT: 66 IN | RESPIRATION RATE: 12 BRPM | BODY MASS INDEX: 28.77 KG/M2 | SYSTOLIC BLOOD PRESSURE: 118 MMHG | DIASTOLIC BLOOD PRESSURE: 80 MMHG | TEMPERATURE: 96.9 F | WEIGHT: 179 LBS | HEART RATE: 93 BPM

## 2018-09-10 DIAGNOSIS — Z13.1 SCREENING FOR DIABETES MELLITUS: ICD-10-CM

## 2018-09-10 DIAGNOSIS — Z13.220 SCREENING FOR HYPERLIPIDEMIA: ICD-10-CM

## 2018-09-10 DIAGNOSIS — M54.50 ACUTE LEFT-SIDED LOW BACK PAIN WITHOUT SCIATICA: Primary | ICD-10-CM

## 2018-09-10 LAB
CHOLESTEROL, TOTAL: 195 MG/DL (ref 0–199)
GLUCOSE BLD-MCNC: 91 MG/DL (ref 70–99)
HDLC SERPL-MCNC: 56 MG/DL (ref 40–60)
LDL CHOLESTEROL CALCULATED: 113 MG/DL
TRIGL SERPL-MCNC: 129 MG/DL (ref 0–150)
VLDLC SERPL CALC-MCNC: 26 MG/DL

## 2018-09-10 PROCEDURE — 99214 OFFICE O/P EST MOD 30 MIN: CPT | Performed by: FAMILY MEDICINE

## 2018-09-10 PROCEDURE — 36415 COLL VENOUS BLD VENIPUNCTURE: CPT | Performed by: FAMILY MEDICINE

## 2018-09-10 RX ORDER — METHYLPREDNISOLONE 4 MG/1
TABLET ORAL
Qty: 1 KIT | Refills: 0 | Status: SHIPPED | OUTPATIENT
Start: 2018-09-10 | End: 2018-09-16

## 2018-09-10 RX ORDER — NAPROXEN 500 MG/1
500 TABLET ORAL 2 TIMES DAILY WITH MEALS
Qty: 30 TABLET | Refills: 0 | Status: SHIPPED | OUTPATIENT
Start: 2018-09-10 | End: 2021-01-06

## 2018-09-10 NOTE — PROGRESS NOTES
Neena Monsalve   YOB: 1975    Date of Visit:  9/10/2018     CC:  Low back pain    HPI:  Patient is a 37 y.o. female who complains of a 3 day(s) history of left lower back pain. Pain is aching in nature, without radiation. Pain is persistent, typically moderate in intensity, and is exacerbated by flexion, extension, lifting and changing positions. Pt denies: weakness, paresthesias, hematuria, dysuria, nausea, vomiting and dermatomal rash. Patient denies the following CPR Red Flags: history of cancer, pain awakening patient from sleep, night sweats, fevers, unintentional weight loss, immunospuression, saddle anesthesia, new bowel or bladder dysfunction and osteoporosis. Precipitating factors: no known injury. Patient's history includes recurrent self limited episodes of low back pain in the past.  Previous treatment: none. Diagnostic testing:  none. Symptoms show no change over time. Vitals:    09/10/18 1006   BP: 118/80   Pulse: 93   Resp: 12   Temp: 96.9 °F (36.1 °C)   TempSrc: Oral   Weight: 179 lb (81.2 kg)   Height: 5' 6\" (1.676 m)       Outpatient Prescriptions Marked as Taking for the 9/10/18 encounter (Office Visit) with Rodney Galloway MD   Medication Sig Dispense Refill    methylPREDNISolone (MEDROL, KAYLA,) 4 MG tablet Take by mouth. 1 kit 0    naproxen (NAPROSYN) 500 MG tablet Take 1 tablet by mouth 2 times daily (with meals) 30 tablet 0    amphetamine-dextroamphetamine (ADDERALL, 5MG,) 5 MG tablet Take 1.5 tablets by mouth 2 times daily for 30 days. . 90 tablet 0    montelukast (SINGULAIR) 10 MG tablet TAKE 1 TABLET BY MOUTH DAILY 90 tablet 1    ondansetron (ZOFRAN) 4 MG tablet Take 1 tablet by mouth every 8 hours as needed for Nausea or Vomiting 30 tablet 0    fluticasone (FLONASE) 50 MCG/ACT nasal spray 1 spray by Nasal route daily 1 Bottle 3    Loratadine (CLARITIN PO) Take by mouth      diphenhydrAMINE (BENADRYL) 25 MG capsule Take 25 mg by mouth every 6 hours as needed for Itching         Past Medical History:   Diagnosis Date    ADHD     Anxiety     Kidney stone     MDD (major depressive disorder)     has been on zoloft, paxil, celexa. (had severe HAs, and nausea on wellbutrin)    Seasonal allergies        Past Surgical History:   Procedure Laterality Date    ABDOMINAL EXPLORATION SURGERY      BREAST ENHANCEMENT SURGERY      TUBAL LIGATION         Social History   Substance Use Topics    Smoking status: Never Smoker    Smokeless tobacco: Never Used    Alcohol use 0.0 oz/week      Comment: socially (a couple glasses on the week)       Family History   Problem Relation Age of Onset    Colon Cancer Mother     Diabetes Father     Diabetes Maternal Aunt     Diabetes Maternal Grandmother     Heart Disease Paternal Grandmother     Diabetes Maternal Aunt     Diabetes Maternal Aunt     Diabetes Maternal Aunt     Diabetes Maternal Aunt     Diabetes Maternal Aunt          ROS:  As documented in HPI    PHYSICAL EXAM:  General:  Patient appears well-developed and well-nourished. She is oriented to person, place and time; behavior, judgment and thought content are normal.  She appears to be in mild pain. Skin:  Warm and dry. No rashes or lesions noted. Musculoskeletal:  - Tenderness to Palpation: left paralumbar region   - Range of Motion:     flexion- full range with pain     extension- full range with pain    rotation- full range with pain bilaterally    lateral bending-full range with pain bilaterally  - Scoliosis is absent. - Hyperkyphosis is absent. Neurological:   - Straight leg raise is negative bilaterally. - Gait is normal.  - Heel walk is normal.  - Toe walk is normal.  - Motor function is normal.  - Sensory function is normal.  - Reflexes are intact and symmetrical bilaterally. Vascular:  Peripheral pulses are palpable. ASSESSMENT/PLAN:  Clinical picture is most consistent with a diagnosis of lumbosacral strain.     1. Acute

## 2018-09-21 DIAGNOSIS — F90.2 ATTENTION DEFICIT HYPERACTIVITY DISORDER (ADHD), COMBINED TYPE: ICD-10-CM

## 2018-09-21 RX ORDER — DEXTROAMPHETAMINE SACCHARATE, AMPHETAMINE ASPARTATE, DEXTROAMPHETAMINE SULFATE AND AMPHETAMINE SULFATE 1.25; 1.25; 1.25; 1.25 MG/1; MG/1; MG/1; MG/1
7.5 TABLET ORAL 2 TIMES DAILY
Qty: 90 TABLET | Refills: 0 | Status: SHIPPED | OUTPATIENT
Start: 2018-09-21 | End: 2018-11-20 | Stop reason: SDUPTHER

## 2018-11-20 ENCOUNTER — OFFICE VISIT (OUTPATIENT)
Dept: FAMILY MEDICINE CLINIC | Age: 43
End: 2018-11-20
Payer: COMMERCIAL

## 2018-11-20 VITALS
DIASTOLIC BLOOD PRESSURE: 75 MMHG | SYSTOLIC BLOOD PRESSURE: 115 MMHG | HEART RATE: 84 BPM | RESPIRATION RATE: 16 BRPM | HEIGHT: 66 IN | BODY MASS INDEX: 29.41 KG/M2 | TEMPERATURE: 97.4 F | WEIGHT: 183 LBS

## 2018-11-20 DIAGNOSIS — F90.2 ATTENTION DEFICIT HYPERACTIVITY DISORDER (ADHD), COMBINED TYPE: ICD-10-CM

## 2018-11-20 DIAGNOSIS — B97.89 ACUTE VIRAL SINUSITIS: Primary | ICD-10-CM

## 2018-11-20 DIAGNOSIS — J01.90 ACUTE VIRAL SINUSITIS: Primary | ICD-10-CM

## 2018-11-20 PROCEDURE — 99213 OFFICE O/P EST LOW 20 MIN: CPT | Performed by: FAMILY MEDICINE

## 2018-11-20 RX ORDER — AZITHROMYCIN 250 MG/1
250 TABLET, FILM COATED ORAL SEE ADMIN INSTRUCTIONS
Qty: 6 TABLET | Refills: 0 | Status: SHIPPED | OUTPATIENT
Start: 2018-11-20 | End: 2018-11-25

## 2018-11-20 RX ORDER — DEXTROAMPHETAMINE SACCHARATE, AMPHETAMINE ASPARTATE, DEXTROAMPHETAMINE SULFATE AND AMPHETAMINE SULFATE 1.25; 1.25; 1.25; 1.25 MG/1; MG/1; MG/1; MG/1
7.5 TABLET ORAL 2 TIMES DAILY
Qty: 90 TABLET | Refills: 0 | Status: SHIPPED | OUTPATIENT
Start: 2018-11-20 | End: 2018-12-03 | Stop reason: ALTCHOICE

## 2018-11-20 RX ORDER — FLUTICASONE PROPIONATE 50 MCG
1 SPRAY, SUSPENSION (ML) NASAL DAILY
Qty: 1 BOTTLE | Refills: 3 | Status: SHIPPED | OUTPATIENT
Start: 2018-11-20 | End: 2021-09-30

## 2018-11-20 RX ORDER — GUAIFENESIN AND DEXTROMETHORPHAN HYDROBROMIDE 1200; 60 MG/1; MG/1
1 TABLET, EXTENDED RELEASE ORAL 2 TIMES DAILY
Qty: 28 TABLET | Refills: 0 | Status: SHIPPED | OUTPATIENT
Start: 2018-11-20 | End: 2021-09-30

## 2018-11-20 NOTE — PROGRESS NOTES
Medical History:   Diagnosis Date    ADHD     Anxiety     Kidney stone     MDD (major depressive disorder)     has been on zoloft, paxil, celexa.   (had severe HAs, and nausea on wellbutrin)    Seasonal allergies        Past Surgical History:   Procedure Laterality Date    ABDOMINAL EXPLORATION SURGERY      BREAST ENHANCEMENT SURGERY      TUBAL LIGATION         Social History   Substance Use Topics    Smoking status: Never Smoker    Smokeless tobacco: Never Used    Alcohol use 0.0 oz/week      Comment: socially (a couple glasses on the week)       Family History   Problem Relation Age of Onset    Colon Cancer Mother     Diabetes Father     Diabetes Maternal Aunt     Diabetes Maternal Grandmother     Heart Disease Paternal Grandmother     Diabetes Maternal Aunt     Diabetes Maternal Aunt     Diabetes Maternal Aunt     Diabetes Maternal Aunt     Diabetes Maternal Aunt            Review of Systems  Constitutional:  + activity or appetite change, fatigue  HENT:  Negative for  rhinorrhea  Eyes:  Negative for eye pain or visual changes  Resp:  Negative for SOB, chest tightness, cough  Cardiovascular: Negative for CP  Gastrointestinal: Negative for abd pain, melena, BRBPR, N/V/D  :  Negative for dysuria, flank pain or urinary frequency  Musculoskeletal:  Negative for back pain or myalgias  Neuro:  Negative for dizziness or lightheadedness  Psych: negative for depression or anxiety      Objective:   Constitutional:   · Reviewed vitals above  · Well Nourished, well developed, no distress       HENT:  · Normal external nose without lesions  · Bilateral TMs translucent with normal light reflex and bony landmarks  · Normal oropharynx without erythema or exudate  · + Nasal congestion  · No pain with palpation of sinuses  Neck:  · No cervical adenopathy  Resp:  · Normal effort  · Clear to auscultation bilaterally without rhonchi, wheezing or crackles  Cardiovascular:  · On auscultation, normal S1 and S2

## 2018-12-03 ENCOUNTER — OFFICE VISIT (OUTPATIENT)
Dept: FAMILY MEDICINE CLINIC | Age: 43
End: 2018-12-03
Payer: COMMERCIAL

## 2018-12-03 VITALS
DIASTOLIC BLOOD PRESSURE: 75 MMHG | WEIGHT: 184 LBS | BODY MASS INDEX: 29.57 KG/M2 | HEIGHT: 66 IN | SYSTOLIC BLOOD PRESSURE: 109 MMHG | HEART RATE: 79 BPM

## 2018-12-03 DIAGNOSIS — F33.1 MODERATE EPISODE OF RECURRENT MAJOR DEPRESSIVE DISORDER (HCC): Primary | ICD-10-CM

## 2018-12-03 DIAGNOSIS — Z23 FLU VACCINE NEED: ICD-10-CM

## 2018-12-03 PROCEDURE — 90471 IMMUNIZATION ADMIN: CPT | Performed by: FAMILY MEDICINE

## 2018-12-03 PROCEDURE — 99213 OFFICE O/P EST LOW 20 MIN: CPT | Performed by: FAMILY MEDICINE

## 2018-12-03 PROCEDURE — 90686 IIV4 VACC NO PRSV 0.5 ML IM: CPT | Performed by: FAMILY MEDICINE

## 2018-12-03 RX ORDER — BUPROPION HYDROCHLORIDE 150 MG/1
150 TABLET ORAL EVERY MORNING
Qty: 30 TABLET | Refills: 0 | Status: SHIPPED | OUTPATIENT
Start: 2018-12-03 | End: 2018-12-17 | Stop reason: SDUPTHER

## 2018-12-03 RX ORDER — MONTELUKAST SODIUM 10 MG/1
TABLET ORAL
Qty: 90 TABLET | Refills: 1 | Status: SHIPPED | OUTPATIENT
Start: 2018-12-03 | End: 2019-07-23 | Stop reason: SDUPTHER

## 2018-12-03 NOTE — PROGRESS NOTES
 TUBAL LIGATION         Social History   Substance Use Topics    Smoking status: Never Smoker    Smokeless tobacco: Never Used    Alcohol use 0.0 oz/week      Comment: socially (a couple glasses on the week)       Family History   Problem Relation Age of Onset    Colon Cancer Mother     Diabetes Father     Diabetes Maternal Aunt     Diabetes Maternal Grandmother     Heart Disease Paternal Grandmother     Diabetes Maternal Aunt     Diabetes Maternal Aunt     Diabetes Maternal Aunt     Diabetes Maternal Aunt     Diabetes Maternal Aunt        Review of Systems  See hpi    Objective:   Physical Exam  Constitutional:   · Reviewed vitals above  · Well Nourished, well developed, no distress       Neck:  · Symmetric and without masses  · No thyromegaly  Resp:  · Normal effort  · Clear to auscultation bilaterally without rhonchi, wheezing or crackles  Cardiovascular:  · On auscultation, normal S1 and S2 without murmurs, rubs or gallops  · No bruits of bilateral carotids and no JVD  Psych:  · Normal mood and affect  · Normal insight and judgement    Assessment:      See below      Plan: Moderate episode of recurrent major depressive disorder [facility])  Discussed pathophysiology of MDD and treatment options. Because she did not tolerate SSRIs in the past, we'll try new class of medicine. Start Wellbutrin 150 mg XL. Discussed side effects and expected course. Pt reported understanding.      - buPROPion (WELLBUTRIN XL) 150 MG extended release tablet; Take 1 tablet by mouth every morning  Dispense: 30 tablet; Refill: 0        HM: gets mammogram in [facility] where she lives.  Encouraged to schedule ASAP    Flu shot given today     15 minutes were spent on this visit, >50% spent with counseling

## 2018-12-17 ENCOUNTER — OFFICE VISIT (OUTPATIENT)
Dept: FAMILY MEDICINE CLINIC | Age: 43
End: 2018-12-17
Payer: COMMERCIAL

## 2018-12-17 VITALS
WEIGHT: 187 LBS | DIASTOLIC BLOOD PRESSURE: 76 MMHG | HEART RATE: 76 BPM | BODY MASS INDEX: 30.05 KG/M2 | HEIGHT: 66 IN | SYSTOLIC BLOOD PRESSURE: 110 MMHG

## 2018-12-17 DIAGNOSIS — J32.0 CHRONIC MAXILLARY SINUSITIS: ICD-10-CM

## 2018-12-17 DIAGNOSIS — F33.1 MODERATE EPISODE OF RECURRENT MAJOR DEPRESSIVE DISORDER (HCC): Primary | ICD-10-CM

## 2018-12-17 PROCEDURE — 99213 OFFICE O/P EST LOW 20 MIN: CPT | Performed by: FAMILY MEDICINE

## 2018-12-17 RX ORDER — BUPROPION HYDROCHLORIDE 150 MG/1
150 TABLET ORAL EVERY MORNING
Qty: 30 TABLET | Refills: 0 | Status: SHIPPED | OUTPATIENT
Start: 2018-12-17 | End: 2019-01-25

## 2018-12-17 RX ORDER — TRAZODONE HYDROCHLORIDE 50 MG/1
50 TABLET ORAL NIGHTLY
Qty: 30 TABLET | Refills: 0 | Status: SHIPPED | OUTPATIENT
Start: 2018-12-17 | End: 2019-01-25

## 2018-12-17 NOTE — PROGRESS NOTES
Bottle 3    Dextromethorphan-Guaifenesin (MUCINEX DM MAXIMUM STRENGTH)  MG TB12 Take 1 tablet by mouth 2 times daily 28 tablet 0    naproxen (NAPROSYN) 500 MG tablet Take 1 tablet by mouth 2 times daily (with meals) 30 tablet 0    ondansetron (ZOFRAN) 4 MG tablet Take 1 tablet by mouth every 8 hours as needed for Nausea or Vomiting 30 tablet 0    diphenhydrAMINE (BENADRYL) 25 MG capsule Take 25 mg by mouth every 6 hours as needed for Itching         Past Medical History:   Diagnosis Date    ADHD     Anxiety     Kidney stone     MDD (major depressive disorder)     has been on zoloft, paxil, celexa.   (had severe HAs, and nausea on wellbutrin)    Seasonal allergies        Past Surgical History:   Procedure Laterality Date    ABDOMINAL EXPLORATION SURGERY      BREAST ENHANCEMENT SURGERY      TUBAL LIGATION         Social History   Substance Use Topics    Smoking status: Never Smoker    Smokeless tobacco: Never Used    Alcohol use 0.0 oz/week      Comment: socially (a couple glasses on the week)       Family History   Problem Relation Age of Onset    Colon Cancer Mother     Diabetes Father     Diabetes Maternal Aunt     Diabetes Maternal Grandmother     Heart Disease Paternal Grandmother     Diabetes Maternal Aunt     Diabetes Maternal Aunt     Diabetes Maternal Aunt     Diabetes Maternal Aunt     Diabetes Maternal Aunt        Review of Systems  Constitutional:  Negative for activity or appetite change, fever or fatigue  HENT:  + congestion, sinus pressure  Eyes:  Negative for eye pain or visual changes  Resp:  Negative for SOB, chest tightness, cough  Neuro:  Negative for dizziness or lightheadedness  Psych: negative for depression or anxiety      Objective:   Physical Exam  Constitutional:   · Reviewed vitals above  · Well Nourished, well developed, no distress       HENT:  · Normal external nose without lesions  · Bilateral TMs translucent with normal light reflex and bony

## 2019-01-08 ENCOUNTER — TELEPHONE (OUTPATIENT)
Dept: FAMILY MEDICINE CLINIC | Age: 44
End: 2019-01-08

## 2019-01-08 DIAGNOSIS — N64.4 BREAST PAIN, LEFT: Primary | ICD-10-CM

## 2019-01-10 ENCOUNTER — HOSPITAL ENCOUNTER (OUTPATIENT)
Dept: WOMENS IMAGING | Age: 44
Discharge: HOME | End: 2019-01-10
Payer: COMMERCIAL

## 2019-01-10 ENCOUNTER — HOSPITAL ENCOUNTER (OUTPATIENT)
Dept: WOMENS IMAGING | Age: 44
End: 2019-01-10
Payer: COMMERCIAL

## 2019-01-10 DIAGNOSIS — N64.4 BREAST PAIN: ICD-10-CM

## 2019-01-10 PROCEDURE — G0279 TOMOSYNTHESIS, MAMMO: HCPCS

## 2019-01-17 ENCOUNTER — TELEPHONE (OUTPATIENT)
Dept: FAMILY MEDICINE CLINIC | Age: 44
End: 2019-01-17

## 2019-01-25 ENCOUNTER — OFFICE VISIT (OUTPATIENT)
Dept: FAMILY MEDICINE CLINIC | Age: 44
End: 2019-01-25
Payer: COMMERCIAL

## 2019-01-25 VITALS
SYSTOLIC BLOOD PRESSURE: 135 MMHG | DIASTOLIC BLOOD PRESSURE: 87 MMHG | HEIGHT: 66 IN | HEART RATE: 80 BPM | TEMPERATURE: 97.7 F | BODY MASS INDEX: 30.37 KG/M2 | WEIGHT: 189 LBS

## 2019-01-25 DIAGNOSIS — J10.1 INFLUENZA B: Primary | ICD-10-CM

## 2019-01-25 PROCEDURE — 99213 OFFICE O/P EST LOW 20 MIN: CPT | Performed by: FAMILY MEDICINE

## 2019-06-27 ENCOUNTER — OFFICE VISIT (OUTPATIENT)
Dept: FAMILY MEDICINE CLINIC | Age: 44
End: 2019-06-27
Payer: COMMERCIAL

## 2019-06-27 VITALS
HEART RATE: 74 BPM | DIASTOLIC BLOOD PRESSURE: 84 MMHG | HEIGHT: 66 IN | RESPIRATION RATE: 12 BRPM | WEIGHT: 189 LBS | SYSTOLIC BLOOD PRESSURE: 124 MMHG | BODY MASS INDEX: 30.37 KG/M2

## 2019-06-27 DIAGNOSIS — R10.13 EPIGASTRIC PAIN: Primary | ICD-10-CM

## 2019-06-27 LAB
A/G RATIO: 1.5 (ref 1.1–2.2)
ALBUMIN SERPL-MCNC: 4.7 G/DL (ref 3.4–5)
ALP BLD-CCNC: 113 U/L (ref 40–129)
ALT SERPL-CCNC: 14 U/L (ref 10–40)
AMYLASE: 45 U/L (ref 25–115)
ANION GAP SERPL CALCULATED.3IONS-SCNC: 13 MMOL/L (ref 3–16)
AST SERPL-CCNC: 15 U/L (ref 15–37)
BASOPHILS ABSOLUTE: 0 K/UL (ref 0–0.2)
BASOPHILS RELATIVE PERCENT: 0.7 %
BILIRUB SERPL-MCNC: 0.3 MG/DL (ref 0–1)
BUN BLDV-MCNC: 11 MG/DL (ref 7–20)
CALCIUM SERPL-MCNC: 10 MG/DL (ref 8.3–10.6)
CHLORIDE BLD-SCNC: 102 MMOL/L (ref 99–110)
CO2: 27 MMOL/L (ref 21–32)
CREAT SERPL-MCNC: 0.8 MG/DL (ref 0.6–1.1)
EOSINOPHILS ABSOLUTE: 0.1 K/UL (ref 0–0.6)
EOSINOPHILS RELATIVE PERCENT: 2.2 %
GFR AFRICAN AMERICAN: >60
GFR NON-AFRICAN AMERICAN: >60
GLOBULIN: 3.2 G/DL
GLUCOSE BLD-MCNC: 99 MG/DL (ref 70–99)
HCT VFR BLD CALC: 41.1 % (ref 36–48)
HEMOGLOBIN: 13.6 G/DL (ref 12–16)
LIPASE: 38 U/L (ref 13–60)
LYMPHOCYTES ABSOLUTE: 1.8 K/UL (ref 1–5.1)
LYMPHOCYTES RELATIVE PERCENT: 40.6 %
MCH RBC QN AUTO: 30.4 PG (ref 26–34)
MCHC RBC AUTO-ENTMCNC: 33 G/DL (ref 31–36)
MCV RBC AUTO: 92.1 FL (ref 80–100)
MONOCYTES ABSOLUTE: 0.4 K/UL (ref 0–1.3)
MONOCYTES RELATIVE PERCENT: 9.2 %
NEUTROPHILS ABSOLUTE: 2.1 K/UL (ref 1.7–7.7)
NEUTROPHILS RELATIVE PERCENT: 47.3 %
PDW BLD-RTO: 12.7 % (ref 12.4–15.4)
PLATELET # BLD: 206 K/UL (ref 135–450)
PMV BLD AUTO: 9.7 FL (ref 5–10.5)
POTASSIUM SERPL-SCNC: 4.9 MMOL/L (ref 3.5–5.1)
RBC # BLD: 4.46 M/UL (ref 4–5.2)
SODIUM BLD-SCNC: 142 MMOL/L (ref 136–145)
TOTAL PROTEIN: 7.9 G/DL (ref 6.4–8.2)
WBC # BLD: 4.5 K/UL (ref 4–11)

## 2019-06-27 PROCEDURE — 99214 OFFICE O/P EST MOD 30 MIN: CPT | Performed by: NURSE PRACTITIONER

## 2019-06-27 PROCEDURE — 36415 COLL VENOUS BLD VENIPUNCTURE: CPT | Performed by: NURSE PRACTITIONER

## 2019-06-27 RX ORDER — CITALOPRAM 10 MG/1
10 TABLET ORAL
COMMUNITY
Start: 2019-02-25 | End: 2020-08-26

## 2019-06-27 NOTE — PROGRESS NOTES
PROGRESS NOTE     Leslie Chakraborty [facility] ([facility]) Physicians  Jin Aceves [address] [address]  [phone] office  [phone] fax    Date of Service:  6/27/2019    Subjective:      Patient ID: Dl Rivas is a 40 y.o. female      CC: Abdominal pain    HPI   Acute GI Symptoms:  Patient complains of a 2 month history of abdominal pain- left upper quadrant and epigastric, intermittent, occuring a few times per day, and lasting several minutes per episode, sharp and achy, severe in intensity, without radiation. Symptoms have been unchanged with time. She states that the pain wakes her up most nights. She has to splint the area in order to reposition herself. Describes the feeling like a pulling or tugging sensation in the area but also has sharp pain at times. Associated symptoms include none. Patient denies dysphagia, heartburn, chest pain, anorexia, nausea, vomiting, constipation, diarrhea and fevers. Symptoms are exacerbated by nothing in particular. Prior history of similar symptoms: no.  Relevant PMH: none. New exposures: none. Therapy to date: H2 blockers: Zantac and Pepcid which were ineffective. Prior diagnostic testing: none. Patient was initially concerned it was related to her breast. She had a mammogram recently which was normal.     Denies any injury. Vitals:    06/27/19 0901   BP: 124/84   Pulse: 74   Resp: 12   Weight: 189 lb (85.7 kg)   Height: 5' 6\" (1.676 m)       Outpatient Medications Marked as Taking for the 6/27/19 encounter (Office Visit) with JUANITA Fernandez CNP   Medication Sig Dispense Refill    citalopram (CELEXA) 10 MG tablet Take 10 mg by mouth      montelukast (SINGULAIR) 10 MG tablet TAKE 1 TABLET BY MOUTH DAILY 90 tablet 1    Cetirizine HCl (ZYRTEC PO) Take by mouth         Past Medical History:   Diagnosis Date    ADHD     Anxiety     Kidney stone     MDD (major depressive disorder)     has been on zoloft, paxil, celexa.   (had severe HAs, and nausea on wellbutrin)    Seasonal allergies        Past Surgical History:   Procedure Laterality Date    ABDOMINAL EXPLORATION SURGERY      BREAST ENHANCEMENT SURGERY      TUBAL LIGATION         Social History     Tobacco Use    Smoking status: Never Smoker    Smokeless tobacco: Never Used   Substance Use Topics    Alcohol use:  Yes     Alcohol/week: 0.0 oz     Comment: socially (a couple glasses on the week)       Family History   Problem Relation Age of Onset    Colon Cancer Mother     Diabetes Father     Diabetes Maternal Aunt     Diabetes Maternal Grandmother     Heart Disease Paternal Grandmother     Diabetes Maternal Aunt     Diabetes Maternal Aunt     Diabetes Maternal Aunt     Diabetes Maternal Aunt     Diabetes Maternal Aunt            Review of Systems  Constitutional:  Negative for activity or appetite change, fever or fatigue  HENT:  Negative for congestion, sinus pressure, or rhinorrhea  Eyes:  Negative for eye pain or visual changes  Resp:  Negative for SOB, chest tightness, cough  Cardiovascular: Negative for CP, palpitations, RICK, orthopnea, PND, LE edema  Gastrointestinal: Negative for melena, BRBPR, N/V/D. + upper abdominal pain  Endocrine:  Negative for polydipsia and polyuria  :  Negative for dysuria, flank pain or urinary frequency  Musculoskeletal:  Negative for back pain or myalgias  Neuro:  Negative for dizziness or lightheadedness  Psych: negative for depression or anxiety      Objective:   Constitutional:   · Reviewed vitals above  · Well Nourished, well developed, no distress       HENT:  · Normal external nose without lesions  Neck:  · Symmetric and without masses  · No thyromegaly  Resp:  · Normal effort  · Clear to auscultation bilaterally without rhonchi, wheezing or crackles  Cardiovascular:  · On auscultation, normal S1 and S2 without murmurs, rubs or gallops  · No bruits of bilateral carotids and no JVD  Gastrointestinal:  · Nondistended, and no

## 2019-07-01 ENCOUNTER — HOSPITAL ENCOUNTER (OUTPATIENT)
Dept: ULTRASOUND IMAGING | Age: 44
Discharge: HOME | End: 2019-07-01
Payer: COMMERCIAL

## 2019-07-01 DIAGNOSIS — R10.13 EPIGASTRIC PAIN: ICD-10-CM

## 2019-07-01 PROCEDURE — 76700 US EXAM ABDOM COMPLETE: CPT

## 2019-07-02 ENCOUNTER — TELEPHONE (OUTPATIENT)
Dept: FAMILY MEDICINE CLINIC | Age: 44
End: 2019-07-02

## 2019-07-02 DIAGNOSIS — R10.12 LEFT UPPER QUADRANT PAIN: ICD-10-CM

## 2019-07-02 DIAGNOSIS — K21.9 GASTROESOPHAGEAL REFLUX DISEASE WITHOUT ESOPHAGITIS: Primary | ICD-10-CM

## 2019-07-02 RX ORDER — PANTOPRAZOLE SODIUM 20 MG/1
20 TABLET, DELAYED RELEASE ORAL
Qty: 30 TABLET | Refills: 0 | Status: SHIPPED | OUTPATIENT
Start: 2019-07-02 | End: 2021-01-06

## 2019-07-09 ENCOUNTER — HOSPITAL ENCOUNTER (OUTPATIENT)
Dept: CT IMAGING | Age: 44
Discharge: HOME | End: 2019-07-09
Payer: COMMERCIAL

## 2019-07-09 DIAGNOSIS — R10.12 LEFT UPPER QUADRANT PAIN: ICD-10-CM

## 2019-07-09 PROCEDURE — 74177 CT ABD & PELVIS W/CONTRAST: CPT

## 2019-07-09 PROCEDURE — 6360000004 HC RX CONTRAST MEDICATION: Performed by: FAMILY MEDICINE

## 2019-07-09 RX ADMIN — IOPAMIDOL 75 ML: 755 INJECTION, SOLUTION INTRAVENOUS at 07:52

## 2019-07-10 ENCOUNTER — TELEPHONE (OUTPATIENT)
Dept: FAMILY MEDICINE CLINIC | Age: 44
End: 2019-07-10

## 2019-07-10 NOTE — TELEPHONE ENCOUNTER
----- Message from JUANITA Cummings CNP sent at 7/9/2019  5:45 PM EDT -----  Please call patient and notify her that her abdominal cat scan was normal. No acute process was seen. Please document call and then close encounter.   thanks

## 2019-07-24 RX ORDER — MONTELUKAST SODIUM 10 MG/1
TABLET ORAL
Qty: 90 TABLET | Refills: 1 | Status: SHIPPED | OUTPATIENT
Start: 2019-07-24 | End: 2020-05-11

## 2019-11-07 NOTE — PROGRESS NOTES
Behavioral Health Consultation Follow-up  Vinita Kim PsyD  Psychologist  12/27/2017  3:40 PM      Time spent with Patient: 30 minutes  [facility] Reason for Consult:  anxiety  Referring Provider: Valeria Vegas MD  [address] [place] [address]    Feedback given to PCP. S:    Last appt: 12/7. More discussion about medication options. Pt has not consulted with PCP yet. Still questions about whether or not she would benefit from a stimulant for ADHD, but we discussed again the complicated picture of most likely having a chronic anxiety disorder in the PTSD spectrum. Encouraged pt to schedule face-to-face with PCP to discuss this directly. Had mentioned to PCP after last appt that there may be a possibility to consult with psychiatrist if needed. Pt will schedule     More focus on benefits of longer term psychotherapy referral as adjunct to medication. Agreed we want to focus on medication questions first. I will continue to bridge services until the long term therapist decision made.      O:  MSE:    Appearance    alert, cooperative  Appetite abnormal: weight gain  Sleep disturbance a bit better  Fatigue Yes  Loss of pleasure Yes  Impulsive behavior No  Speech    normal rate, normal volume and well articulated  Mood    Stress levels down a bit  Affect    Congruent with full range  Thought Content    intact  Thought Process    linear, goal directed and coherent  Associations    logical connections  Insight    Good  Judgment    Intact  Orientation    oriented to person, place, time, and general circumstances  Memory    recent and remote memory intact  Attention/Concentration    intact  Morbid ideation No  Suicide Assessment    no suicidal ideation      History:    Medications:   Current Outpatient Prescriptions   Medication Sig Dispense Refill    ondansetron (ZOFRAN) 4 MG tablet Take 1 tablet by mouth every 8 hours as needed for Nausea or Vomiting Progress Note - Janet Zhou 1959, 61 y o  female MRN: [id number]    Unit/Bed#: -Karri Encounter: [id number]    Primary Care Provider: No primary care provider on file  Date and time admitted to hospital: 11/4/2019  8:43 AM        Hyperlipidemia  Assessment & Plan  -continue atorvastatin  -resume Vascepa upon discharge    HTN (hypertension)  Assessment & Plan  -continue amlodipine plus triamterene plus hydrochlorothiazide  Monitor creatinine surrounding surgery  History of lung cancer  Assessment & Plan  Noted  -continue home neb treatments    * Tibial plateau fracture, left  Assessment & Plan  Patient denied that it was a syncopal episode and was purely due to a mechanical fall  -will put the patient on bed rest for now and fall precautions  -orthopedic surgery was made aware by the ED and plans on performing surgical intervention tomorrow  -Patient for surgical repair on Thursday   -will order Percocet p r n  Morphine not effective will change to diluadid   -Cole catheter placement   -patient is medically cleared for surgical intervention in the am        VTE Pharmacologic Prophylaxis:   Pharmacologic: Enoxaparin (Lovenox) last dose prior to surgery  Mechanical: Mechanical VTE prophylaxis in place  Patient Centered Rounds: nursing unavailable to round updated via phone  Discussions with Specialists or Other Care Team Provider: orthopedics  Education and Discussions with Family / Patient: none, updated patient  Time Spent for Care: 30 minutes    If More than 50% of total time spent on counseling and coordination of care as described above indicate yes or no and described the counseling and coordination:none    Current Length of Stay: 3 day(s)  Current Patient Status: Inpatient   Certification Statement: The patient will continue to require additional inpatient hospital stay due to needs surgical intervention    Discharge Plan: to be determined  Code Status: Level 1 - Full Code    Subjective:   Patient states pain still not the greatest but does respond to IV dilaudid, has not moved bowls but is declining medication at this time  Objective:   Vitals:   Temp (24hrs), Av °F (37 2 °C), Min:98 3 °F (36 8 °C), Max:99 5 °F (37 5 °C)    Temp:  [98 3 °F (36 8 °C)-99 5 °F (37 5 °C)] 99 5 °F (37 5 °C)  HR:  [90-98] 90  Resp:  [17-18] 17  BP: (135-163)/(81-99) 163/99  SpO2:  [94 %-97 %] 97 %  Body mass index is 35 81 kg/m²  Input and Output Summary (last 24 hours): Intake/Output Summary (Last 24 hours) at 2019 0541  Last data filed at 2019 0013  Gross per 24 hour   Intake 120 ml   Output 200 ml   Net -80 ml       Physical Exam:     Physical Exam   Constitutional: She is oriented to person, place, and time  She appears well-developed  No distress  HENT:   Head: Normocephalic and atraumatic  Right Ear: External ear normal    Left Ear: External ear normal    Nose: Nose normal    Mouth/Throat: Oropharynx is clear and moist  No oropharyngeal exudate  Eyes: Pupils are equal, round, and reactive to light  Conjunctivae and EOM are normal  Right eye exhibits no discharge  Left eye exhibits no discharge  No scleral icterus  Neck: Normal range of motion  Neck supple  No JVD present  No thyromegaly present  Cardiovascular: Normal rate, regular rhythm, normal heart sounds and intact distal pulses  Exam reveals no gallop and no friction rub  No murmur heard  Pulmonary/Chest: Effort normal and breath sounds normal  No respiratory distress  She has no wheezes  She has no rales  Abdominal: Soft  Bowel sounds are normal  She exhibits no distension  There is no tenderness  There is no rebound and no guarding  Musculoskeletal: Normal range of motion  She exhibits edema  She exhibits no deformity  Neurovascular intact   Lymphadenopathy:     She has no cervical adenopathy  Neurological: She is alert and oriented to person, place, and time  She has normal reflexes   She displays normal reflexes  No cranial nerve deficit  She exhibits normal muscle tone  Skin: Skin is warm and dry  No rash noted  She is not diaphoretic  No erythema  Psychiatric: She has a normal mood and affect  Vitals reviewed  Additional Data:   Labs:  Results from last 7 days   Lab Units 11/06/19  0520  11/04/19  1057   WBC Thousand/uL 9 14   < > 6 44   HEMOGLOBIN g/dL 12 4   < > 12 2   HEMATOCRIT % 35 5   < > 36 8   PLATELETS Thousands/uL 251   < > 281   NEUTROS PCT %  --   --  67   LYMPHS PCT %  --   --  24   MONOS PCT %  --   --  7   EOS PCT %  --   --  1    < > = values in this interval not displayed  Results from last 7 days   Lab Units 11/07/19  0455  11/05/19  0447   POTASSIUM mmol/L 3 2*   < > 4 6   CHLORIDE mmol/L 98*   < > 97*   CO2 mmol/L 30   < > 23   BUN mg/dL 5   < > 9   CREATININE mg/dL 0 73   < > 0 61   CALCIUM mg/dL 8 7   < > 7 8*   ALK PHOS U/L  --   --  78   ALT U/L  --   --  23   AST U/L  --   --  47*    < > = values in this interval not displayed  Results from last 7 days   Lab Units 11/04/19  1057   INR  1 16       * I Have Reviewed All Lab Data Listed Above  * Additional Pertinent Lab Tests Reviewed:  All Labs Within Last 24 Hours Reviewed    Imaging:    Imaging Reports Reviewed Today Include: none    Cultures:   Blood Culture: No results found for: BLOODCX  Urine Culture: No results found for: URINECX  Sputum Culture: No components found for: SPUTUMCX  Wound Culture: No results found for: WOUNDCULT    Last 24 Hours Medication List:     Current Facility-Administered Medications:  albuterol 2 puff Inhalation Q4H PRN Alireza Saenz MD    amLODIPine-atorvastatin (CADUET 5/10) combo dose  Oral Daily Alireza Saenz MD    budesonide-formoterol 2 puff Inhalation BID Alireza Saenz MD    docusate sodium 100 mg Oral BID PRN Alireza Saenz MD    HYDROmorphone 0 5 mg Intravenous Q4H PRN Jimmy Mas MD    lactated ringers 75 mL/hr Intravenous Continuous Wellsavery Saenz MD Last Rate: 75 mL/hr (11/07/19 0151)   montelukast 10 mg Oral HS James Day MD    oxyCODONE-acetaminophen 1 tablet Oral Q4H PRN James Day MD    senna 1 tablet Oral HS Regino Ignram MD    triamterene-hydrochlorothiazide 1 tablet Oral Daily James Day MD         Today, Patient Was Seen By: Regino Ingram MD    ** Please Note: Dragon 360 Dictation voice to text software may have been used in the creation of this document   **

## 2019-11-21 ENCOUNTER — OFFICE VISIT (OUTPATIENT)
Dept: FAMILY MEDICINE CLINIC | Age: 44
End: 2019-11-21
Payer: COMMERCIAL

## 2019-11-21 VITALS
HEIGHT: 66 IN | DIASTOLIC BLOOD PRESSURE: 75 MMHG | WEIGHT: 198 LBS | BODY MASS INDEX: 31.82 KG/M2 | HEART RATE: 89 BPM | SYSTOLIC BLOOD PRESSURE: 108 MMHG

## 2019-11-21 DIAGNOSIS — M54.41 CHRONIC RIGHT-SIDED LOW BACK PAIN WITH RIGHT-SIDED SCIATICA: ICD-10-CM

## 2019-11-21 DIAGNOSIS — G89.29 CHRONIC RIGHT-SIDED LOW BACK PAIN WITH RIGHT-SIDED SCIATICA: ICD-10-CM

## 2019-11-21 DIAGNOSIS — E66.9 CLASS 1 OBESITY WITHOUT SERIOUS COMORBIDITY WITH BODY MASS INDEX (BMI) OF 31.0 TO 31.9 IN ADULT, UNSPECIFIED OBESITY TYPE: Primary | ICD-10-CM

## 2019-11-21 DIAGNOSIS — M54.2 NECK PAIN: ICD-10-CM

## 2019-11-21 PROCEDURE — 99213 OFFICE O/P EST LOW 20 MIN: CPT | Performed by: NURSE PRACTITIONER

## 2019-12-03 ENCOUNTER — OFFICE VISIT (OUTPATIENT)
Dept: ORTHOPEDIC SURGERY | Age: 44
End: 2019-12-03
Payer: COMMERCIAL

## 2019-12-03 VITALS
BODY MASS INDEX: 31.82 KG/M2 | HEIGHT: 66 IN | DIASTOLIC BLOOD PRESSURE: 75 MMHG | WEIGHT: 197.97 LBS | SYSTOLIC BLOOD PRESSURE: 122 MMHG

## 2019-12-03 DIAGNOSIS — M51.36 DEGENERATIVE DISC DISEASE, LUMBAR: ICD-10-CM

## 2019-12-03 DIAGNOSIS — M54.16 LUMBAR RADICULOPATHY: ICD-10-CM

## 2019-12-03 DIAGNOSIS — M51.26 HNP (HERNIATED NUCLEUS PULPOSUS), LUMBAR: Primary | ICD-10-CM

## 2019-12-03 DIAGNOSIS — M54.50 PAIN OF LUMBAR SPINE: ICD-10-CM

## 2019-12-03 PROCEDURE — 99204 OFFICE O/P NEW MOD 45 MIN: CPT | Performed by: PHYSICAL MEDICINE & REHABILITATION

## 2019-12-03 RX ORDER — METHYLPREDNISOLONE 4 MG/1
TABLET ORAL
Qty: 1 KIT | Refills: 0 | Status: SHIPPED | OUTPATIENT
Start: 2019-12-03 | End: 2020-08-26

## 2019-12-06 ENCOUNTER — TELEPHONE (OUTPATIENT)
Dept: ORTHOPEDIC SURGERY | Age: 44
End: 2019-12-06

## 2019-12-17 ENCOUNTER — OFFICE VISIT (OUTPATIENT)
Dept: ORTHOPEDIC SURGERY | Age: 44
End: 2019-12-17
Payer: COMMERCIAL

## 2019-12-17 VITALS
HEIGHT: 66 IN | DIASTOLIC BLOOD PRESSURE: 77 MMHG | WEIGHT: 197.97 LBS | SYSTOLIC BLOOD PRESSURE: 111 MMHG | BODY MASS INDEX: 31.82 KG/M2 | HEART RATE: 67 BPM

## 2019-12-17 DIAGNOSIS — M70.61 GREATER TROCHANTERIC BURSITIS OF BOTH HIPS: ICD-10-CM

## 2019-12-17 DIAGNOSIS — M51.36 DEGENERATIVE DISC DISEASE, LUMBAR: ICD-10-CM

## 2019-12-17 DIAGNOSIS — M51.26 HNP (HERNIATED NUCLEUS PULPOSUS), LUMBAR: Primary | ICD-10-CM

## 2019-12-17 DIAGNOSIS — M70.62 GREATER TROCHANTERIC BURSITIS OF BOTH HIPS: ICD-10-CM

## 2019-12-17 DIAGNOSIS — M54.16 LUMBAR RADICULOPATHY: ICD-10-CM

## 2019-12-17 PROCEDURE — 99213 OFFICE O/P EST LOW 20 MIN: CPT | Performed by: PHYSICIAN ASSISTANT

## 2019-12-17 PROCEDURE — 20610 DRAIN/INJ JOINT/BURSA W/O US: CPT | Performed by: PHYSICIAN ASSISTANT

## 2020-02-27 ENCOUNTER — OFFICE VISIT (OUTPATIENT)
Dept: FAMILY MEDICINE CLINIC | Age: 45
End: 2020-02-27
Payer: COMMERCIAL

## 2020-02-27 VITALS
WEIGHT: 200 LBS | HEIGHT: 66 IN | BODY MASS INDEX: 32.14 KG/M2 | SYSTOLIC BLOOD PRESSURE: 117 MMHG | DIASTOLIC BLOOD PRESSURE: 76 MMHG | HEART RATE: 91 BPM

## 2020-02-27 LAB
A/G RATIO: 1.5 (ref 1.1–2.2)
ALBUMIN SERPL-MCNC: 4 G/DL (ref 3.4–5)
ALP BLD-CCNC: 91 U/L (ref 40–129)
ALT SERPL-CCNC: 19 U/L (ref 10–40)
ANION GAP SERPL CALCULATED.3IONS-SCNC: 11 MMOL/L (ref 3–16)
AST SERPL-CCNC: 16 U/L (ref 15–37)
BASOPHILS ABSOLUTE: 0 K/UL (ref 0–0.2)
BASOPHILS RELATIVE PERCENT: 0.5 %
BILIRUB SERPL-MCNC: 0.3 MG/DL (ref 0–1)
BILIRUBIN, POC: NEGATIVE
BLOOD URINE, POC: NEGATIVE
BUN BLDV-MCNC: 12 MG/DL (ref 7–20)
CALCIUM SERPL-MCNC: 9.2 MG/DL (ref 8.3–10.6)
CHLORIDE BLD-SCNC: 101 MMOL/L (ref 99–110)
CLARITY, POC: NORMAL
CO2: 26 MMOL/L (ref 21–32)
COLOR, POC: NORMAL
CREAT SERPL-MCNC: 0.7 MG/DL (ref 0.6–1.1)
EOSINOPHILS ABSOLUTE: 0.1 K/UL (ref 0–0.6)
EOSINOPHILS RELATIVE PERCENT: 1.8 %
GFR AFRICAN AMERICAN: >60
GFR NON-AFRICAN AMERICAN: >60
GLOBULIN: 2.6 G/DL
GLUCOSE BLD-MCNC: 85 MG/DL (ref 70–99)
GLUCOSE URINE, POC: NEGATIVE
HCT VFR BLD CALC: 37.5 % (ref 36–48)
HEMOGLOBIN: 12.7 G/DL (ref 12–16)
INFLUENZA VIRUS A RNA: NEGATIVE
INFLUENZA VIRUS B RNA: NEGATIVE
KETONES, POC: NEGATIVE
LEUKOCYTE EST, POC: NEGATIVE
LYMPHOCYTES ABSOLUTE: 1.7 K/UL (ref 1–5.1)
LYMPHOCYTES RELATIVE PERCENT: 26.2 %
MCH RBC QN AUTO: 31.3 PG (ref 26–34)
MCHC RBC AUTO-ENTMCNC: 33.8 G/DL (ref 31–36)
MCV RBC AUTO: 92.5 FL (ref 80–100)
MONOCYTES ABSOLUTE: 0.5 K/UL (ref 0–1.3)
MONOCYTES RELATIVE PERCENT: 7.3 %
NEUTROPHILS ABSOLUTE: 4.1 K/UL (ref 1.7–7.7)
NEUTROPHILS RELATIVE PERCENT: 64.2 %
NITRITE, POC: NEGATIVE
PDW BLD-RTO: 12.9 % (ref 12.4–15.4)
PH, POC: 6
PLATELET # BLD: 196 K/UL (ref 135–450)
PMV BLD AUTO: 9.4 FL (ref 5–10.5)
POTASSIUM SERPL-SCNC: 4.5 MMOL/L (ref 3.5–5.1)
PROTEIN, POC: NEGATIVE
RBC # BLD: 4.05 M/UL (ref 4–5.2)
SODIUM BLD-SCNC: 138 MMOL/L (ref 136–145)
SPECIFIC GRAVITY, POC: 1.02
TOTAL PROTEIN: 6.6 G/DL (ref 6.4–8.2)
UROBILINOGEN, POC: 0.2
WBC # BLD: 6.3 K/UL (ref 4–11)

## 2020-02-27 PROCEDURE — 87502 INFLUENZA DNA AMP PROBE: CPT | Performed by: NURSE PRACTITIONER

## 2020-02-27 PROCEDURE — 99214 OFFICE O/P EST MOD 30 MIN: CPT | Performed by: NURSE PRACTITIONER

## 2020-02-27 PROCEDURE — 81002 URINALYSIS NONAUTO W/O SCOPE: CPT | Performed by: NURSE PRACTITIONER

## 2020-02-27 PROCEDURE — 36415 COLL VENOUS BLD VENIPUNCTURE: CPT | Performed by: NURSE PRACTITIONER

## 2020-02-27 RX ORDER — AZITHROMYCIN 250 MG/1
250 TABLET, FILM COATED ORAL SEE ADMIN INSTRUCTIONS
Qty: 6 TABLET | Refills: 0 | Status: SHIPPED | OUTPATIENT
Start: 2020-02-27 | End: 2020-03-03

## 2020-02-27 NOTE — PROGRESS NOTES
PROGRESS NOTE     [facility] ([facility]) Physicians  Jin Aceves [address] [address]  [phone] office  [phone] fax    Date of Service:  2/27/2020    Subjective:      Patient ID: . Archie Rapp is a 40 y.o. female      CC: URI    HPI  Respiratory Symptoms:  Patient complains of 3 day(s) history of headache, facial pain/sinus pressure: bilateral maxillary and bilateral frontal, sore throat, wheezing/chest tightness, non-productive cough and diarrhea. Symptoms have been unchanged with time. She denies fever, ear symptoms, shortness of breath and nausea/vomiting. Relevant PMH: No pertinent PMH. Smoking history:  She  reports that she has never smoked. She has never used smokeless tobacco. She has had no known ill contacts. Treatment to date: Acetaminophen, NSAID, which has been  not very effective. She also mentions she has some lower back pain and may have had some blood in her urine. No fevers reported. Denies dysuria and frequency. Patient was seen in the ER 2 days ago on 2/25 with complaints of headache and nausea. Head CT was unremarkable. Patient was given IVF, toradol, reglan and benadryl. No labs were drawn. Patient improved and was discharged home. Vitals:    02/27/20 1155   BP: 117/76   Pulse: 91   Weight: 200 lb (90.7 kg)   Height: 5' 5.98\" (1.676 m)       Outpatient Medications Marked as Taking for the 2/27/20 encounter (Office Visit) with JUANITA Kidd CNP   Medication Sig Dispense Refill    azithromycin (ZITHROMAX) 250 MG tablet Take 1 tablet by mouth See Admin Instructions for 5 days 500mg on day 1 followed by 250mg on days 2 - 5 6 tablet 0    methylPREDNISolone (MEDROL, KAYLA,) 4 MG tablet Take by mouth as directed.  1 kit 0    montelukast (SINGULAIR) 10 MG tablet TAKE 1 TABLET BY MOUTH DAILY 90 tablet 1    pantoprazole (PROTONIX) 20 MG tablet Take 1 tablet by mouth every morning (before breakfast) 30 tablet 0    citalopram (CELEXA) 10 MG Negative for abd pain, melena, BRBPR, N/V/D  Endocrine:  Negative for polydipsia and polyuria  :  Negative for dysuria, or urinary frequency. + flank pain  Musculoskeletal:  Negative for back pain or myalgias  Neuro:  Negative for dizziness or lightheadedness  Psych: negative for depression or anxiety      Objective:   Constitutional:   · Reviewed vitals above  · Well Nourished, well developed, no distress       HENT:  · Normal external nose without lesions  · Bilateral TMs translucent with normal light reflex and bony landmarks  · Normal oropharynx without erythema or exudate  · Normal nasal mucosa without swelling or erythema  · + maxillary and frontal sinus tenderness  Neck:  · Symmetric and without masses  · No thyromegaly  Resp:  · Normal effort  · Clear to auscultation bilaterally without rhonchi, wheezing or crackles  Cardiovascular:  · On auscultation, normal S1 and S2 without murmurs, rubs or gallops  · No bruits of bilateral carotids and no JVD  Gastrointestinal:  · Nontender, nondistended, and no masses  · No hepatosplenomegaly  · No CVA or suprapubic tenderness  Musculoskeletal:  · Normal Gait  · All extremities without clubbing, cyanosis or edema  Skin:  · No rashes on inspection  · No areas of increased heat or induration on palpation  Psych:  · Normal mood and affect  · Normal insight and judgement    Assessment / Plan:     1. Acute bacterial sinusitis  We will treat with antibiotic. Patient is penicillin allergic. Z-Panchito has worked well for her in the past for other sinus infections. Instructed to notify office if symptoms do not improve. Encouraged increased p.o. fluid intake and rest.    - azithromycin (ZITHROMAX) 250 MG tablet; Take 1 tablet by mouth See Admin Instructions for 5 days 500mg on day 1 followed by 250mg on days 2 - 5  Dispense: 6 tablet; Refill: 0    2. Flank pain  Urinalysis does not have any signs of infection and blood was negative. Will send for culture to be sure.     - POCT Urinalysis no Micro  - URINE CULTURE    3. Fatigue, unspecified type  Patient has multiple somewhat vague complaints of not feeling well. She states she has been exposed to the flu as multiple coworkers have had it. Flu check today was negative. Will check a CBC to rule out anemia and CMP to evaluate renal and liver function.     - Comprehensive Metabolic Panel  - CBC Auto Differential  - POCT Influenza A/B DNA

## 2020-02-28 LAB — URINE CULTURE, ROUTINE: NORMAL

## 2020-03-09 ENCOUNTER — TELEPHONE (OUTPATIENT)
Dept: FAMILY MEDICINE CLINIC | Age: 45
End: 2020-03-09

## 2020-04-29 ENCOUNTER — VIRTUAL VISIT (OUTPATIENT)
Dept: FAMILY MEDICINE CLINIC | Age: 45
End: 2020-04-29
Payer: COMMERCIAL

## 2020-04-29 PROCEDURE — 99214 OFFICE O/P EST MOD 30 MIN: CPT | Performed by: NURSE PRACTITIONER

## 2020-04-29 RX ORDER — CITALOPRAM 10 MG/1
10 TABLET ORAL DAILY
Qty: 90 TABLET | Refills: 1 | Status: SHIPPED | OUTPATIENT
Start: 2020-04-29 | End: 2020-08-26

## 2020-04-29 NOTE — PROGRESS NOTES
also been advised to contact this office for worsening conditions or problems, and seek emergency medical treatment and/or call 911 if deemed necessary. Patient identification was verified at the start of the visit: Yes    Total time spent on this encounter: 30 minutes    Services were provided through a video synchronous discussion virtually to substitute for in-person clinic visit. Patient and provider were located at their individual homes. --JUANITA Barton CNP on 4/29/2020 at 10:41 AM    An electronic signature was used to authenticate this note.

## 2020-04-30 ENCOUNTER — NURSE ONLY (OUTPATIENT)
Dept: FAMILY MEDICINE CLINIC | Age: 45
End: 2020-04-30
Payer: COMMERCIAL

## 2020-04-30 VITALS — SYSTOLIC BLOOD PRESSURE: 114 MMHG | DIASTOLIC BLOOD PRESSURE: 80 MMHG

## 2020-04-30 LAB — TSH REFLEX: 0.76 UIU/ML (ref 0.27–4.2)

## 2020-04-30 PROCEDURE — 36415 COLL VENOUS BLD VENIPUNCTURE: CPT | Performed by: NURSE PRACTITIONER

## 2020-05-11 RX ORDER — MONTELUKAST SODIUM 10 MG/1
TABLET ORAL
Qty: 90 TABLET | Refills: 1 | Status: SHIPPED | OUTPATIENT
Start: 2020-05-11 | End: 2022-01-25 | Stop reason: SDUPTHER

## 2020-07-08 ENCOUNTER — TELEPHONE (OUTPATIENT)
Dept: FAMILY MEDICINE CLINIC | Age: 45
End: 2020-07-08

## 2020-07-08 NOTE — TELEPHONE ENCOUNTER
PT called in wanting to know if Dr. Fredrick Bradford could refer her to see a therapist.     Manoj Baptiste call back number: [phone]

## 2020-07-08 NOTE — TELEPHONE ENCOUNTER
Pt needs appt with me first    Ok to schedule as VV or in person per pt's preference    Please document call and then close encounter.   thanks

## 2020-07-21 ENCOUNTER — TELEPHONE (OUTPATIENT)
Dept: FAMILY MEDICINE CLINIC | Age: 45
End: 2020-07-21

## 2020-07-21 NOTE — TELEPHONE ENCOUNTER
Please call patient back and let her know her symptoms do seem indicative of COVID. Please give her the number to call and schedule testing at [address] [place] [address]. Explained that it is best if patient waits 1 or 2 days at least after symptom onset as the test is more accurate that way. Yes patient should change appointment on Friday to virtual since she is not feeling well. Please document call and then close encounter.   thanks

## 2020-07-21 NOTE — TELEPHONE ENCOUNTER
Pt called in stating that she has an appointment scheduled for Friday with Dr. Kem Choi. PT says that on Sunday she experienced vertigo and had a migraine which have since gone away. However, now she has a headache, sore throat, body aches, chest pain, fatigue, NO FEVER. She would like to know if she should get tested for COVID or if this is a sinus infection.      Please give pt a call back to adv and if her appointment should be made into a virtual.    Best call back number: [phone]

## 2020-07-22 ENCOUNTER — OFFICE VISIT (OUTPATIENT)
Dept: PRIMARY CARE CLINIC | Age: 45
End: 2020-07-22

## 2020-07-22 PROCEDURE — 99211 OFF/OP EST MAY X REQ PHY/QHP: CPT | Performed by: NURSE PRACTITIONER

## 2020-07-23 NOTE — PROGRESS NOTES
2020    [address] [place] [address]  [facility]  [phone] office  [phone] fax      TELEHEALTH EVALUATION -- Audio/Visual (During LMLPL-62 public health emergency)    HPI:    Gladis Henson (:  1975) has requested an audio/video evaluation for the following concern(s):    MDD/anxiety  Patient presents for follow-up of anxiety disorder. Current complaints include: Patient is having some mild worsening anxiety, and more issues between her and her  since they have been around each other more do to the COVID-19 pandemic. She feels that the citalopram 10 mg is working well, but would like referral to see a counselor. .  She denies anhedonia, depressed mood, tearfulness, feelings of hopelessness, feelings of worthlessness/excessive guilt, insomnia, increased use of drugs or alcohol and suicidal thoughts or behavior. Symptoms/signs of jaciel: none. Current treatment includes: Celexa- 10mg. Medication side effects: none. Acute illness  3 days of nasal congestion, fatigue, HA, intermittent sore throat, mild CP and mild SOB with exertion. No fever, trouble with taste or smell, abd pain, n/v/d. Had episode of vertigo on . COVID test pending, was instructed to go 2 days ago. Review of Systems  See HPI    Prior to Visit Medications    Medication Sig Taking? Authorizing Provider   albuterol sulfate HFA (VENTOLIN HFA) 108 (90 Base) MCG/ACT inhaler Inhale 2 puffs into the lungs 4 times daily as needed for Wheezing Yes Jazmyn Srivastava MD   montelukast (SINGULAIR) 10 MG tablet TAKE 1 TABLET BY MOUTH DAILY  JUANITA Maurer CNP   citalopram (CELEXA) 10 MG tablet Take 1 tablet by mouth daily  JUANITA Villar CNP   methylPREDNISolone (MEDROL, KAYLA,) 4 MG tablet Take by mouth as directed.   Alyssa Sherwood MD   pantoprazole (PROTONIX) 20 MG tablet Take 1 tablet by mouth every morning (before breakfast) Julio Velez, APRN - CNP   citalopram (CELEXA) 10 MG tablet Take 10 mg by mouth  Historical Provider, MD   Cetirizine HCl (ZYRTEC PO) Take by mouth  Historical Provider, MD   fluticasone (FLONASE) 50 MCG/ACT nasal spray 1 spray by Nasal route daily  Niels Lazo MD   Dextromethorphan-Guaifenesin (MUCINEX DM MAXIMUM STRENGTH)  MG TB12 Take 1 tablet by mouth 2 times daily  Niels Lazo MD   naproxen (NAPROSYN) 500 MG tablet Take 1 tablet by mouth 2 times daily (with meals)  Niels Lazo MD   ondansetron (ZOFRAN) 4 MG tablet Take 1 tablet by mouth every 8 hours as needed for Nausea or Vomiting  Chente Pierce MD   diphenhydrAMINE (BENADRYL) 25 MG capsule Take 25 mg by mouth every 6 hours as needed for Itching  Historical Provider, MD       Social History     Tobacco Use    Smoking status: Never Smoker    Smokeless tobacco: Never Used   Substance Use Topics    Alcohol use: Yes     Alcohol/week: 0.0 standard drinks     Comment: socially (a couple glasses on the week)    Drug use: No        Allergies   Allergen Reactions    Trazodone Nausea Only and Shortness Of Breath    Pcn [Penicillins] Rash    Cephalosporins Hives    Wellbutrin [Bupropion] Other (See Comments)   ,   Past Medical History:   Diagnosis Date    ADHD     Anxiety     Kidney stone     MDD (major depressive disorder)     has been on zoloft, paxil, celexa. (had severe HAs, and nausea on wellbutrin)    Seasonal allergies        PHYSICAL EXAMINATION:  [ INSTRUCTIONS:  \"[x]\" Indicates a positive item  \"[]\" Indicates a negative item  -- DELETE ALL ITEMS NOT EXAMINED]    Constitutional: [x] Appears well-developed and well-nourished [x] No apparent distress      [] Abnormal-   Mental status  [x] Alert and awake  [] Oriented to person/place/time []Able to follow commands        HENT:   [x] Normocephalic, atraumatic.   [] Abnormal   [] Mouth/Throat: Mucous membranes are moist.     External Ears [x] Normal  [] Abnormal-     Neck: [x] No visualized mass     Pulmonary/Chest: [x] Respiratory effort normal.  [] No visualized signs of difficulty breathing or respiratory distress        [] Abnormal-      Musculoskeletal:   [x] Normal gait with no signs of ataxia         [] Normal range of motion of neck        [] Abnormal-       Neurological:        [x] No Facial Asymmetry (Cranial nerve 7 motor function) (limited exam to video visit)          [] No gaze palsy        [] Abnormal-         Skin:        [x] No significant exanthematous lesions or discoloration noted on facial skin         [] Abnormal-            Psychiatric:       [x] Normal Affect [] No Hallucinations        [] Abnormal-     Other pertinent observable physical exam findings-     ASSESSMENT/PLAN:  1. Anxiety/Moderate episode of recurrent major depressive disorder ([address].  Mostly well controlled with Celexa 10 mg. Patient does not want to adjust dose. Feels that some of her worsening anxiety has been secondary to the COVID-19 pandemic, and having to be in such close quarters with her  all the time. This is caused some issues in the relationship. We will start with referral to Dr. Ema Richards for counseling at patient's request.  - Ambulatory referral to Psychology    2. Viral illness  --likely viral and self resolving. No signs of bacterial infection. These symptoms were reviewed with pt. OTC meds for symptom relief    Discussed that symptoms are very concerning for COVID-19. Test is still pending. Discussed high false-negative rate, and the need to follow isolation instructions even if test is negative. She states she has had an albuterol inhaler in the past during viral illnesses when she felt mildly short of breath. This was sent to pharmacy. She understands if shortness of breath worsens or if chest pain worsens, she is to go directly to the emergency room.           Colonoscopy 11/8/19: needs repeat in 2024    Normal pap smear with neg HPV on 2/13/17: next due 2022    Normal mammogram 1/10/19:  Reordered and gave pt number to schedule: [phone]    Ordering FLP and fasting glucose to be done at future nurse visit      Betzy Cazares is a 39 y.o. female being evaluated by a Virtual Visit (video visit) encounter to address concerns as mentioned above. A caregiver was present when appropriate. Due to this being a TeleHealth encounter (During [place] [address] public health emergency), evaluation of the following organ systems was limited: Vitals/Constitutional/EENT/Resp/CV/GI//MS/Neuro/Skin/Heme-Lymph-Imm. Pursuant to the emergency declaration under the [address] [place] [address], [address] [place] [address] authority and the [employer] Act, this Virtual Visit was conducted with patient's (and/or legal guardian's) consent, to reduce the patient's risk of exposure to COVID-19 and provide necessary medical care. The patient (and/or legal guardian) has also been advised to contact this office for worsening conditions or problems, and seek emergency medical treatment and/or call 911 if deemed necessary. Patient identification was verified at the start of the visit: Yes    25 min was spent during the encounter, >50% spent counseling      Services were provided through a video synchronous discussion virtually to substitute for in-person clinic visit. Patient and provider were located at their individual homes. --Dago Pichardo MD on 7/24/2020 at 10:54 AM    An electronic signature was used to authenticate this note.

## 2020-07-24 ENCOUNTER — VIRTUAL VISIT (OUTPATIENT)
Dept: FAMILY MEDICINE CLINIC | Age: 45
End: 2020-07-24
Payer: COMMERCIAL

## 2020-07-24 PROCEDURE — 99214 OFFICE O/P EST MOD 30 MIN: CPT | Performed by: FAMILY MEDICINE

## 2020-07-24 RX ORDER — ALBUTEROL SULFATE 90 UG/1
2 AEROSOL, METERED RESPIRATORY (INHALATION) 4 TIMES DAILY PRN
Qty: 1 INHALER | Refills: 0 | Status: SHIPPED | OUTPATIENT
Start: 2020-07-24 | End: 2021-01-06

## 2020-07-27 ENCOUNTER — TELEPHONE (OUTPATIENT)
Dept: FAMILY MEDICINE CLINIC | Age: 45
End: 2020-07-27

## 2020-07-27 NOTE — TELEPHONE ENCOUNTER
It has been taking a week to get them back, it shows like it does in the computer until the final results are done

## 2020-07-28 LAB
SARS-COV-2: NOT DETECTED
SOURCE: NORMAL

## 2020-08-18 ENCOUNTER — VIRTUAL VISIT (OUTPATIENT)
Dept: FAMILY MEDICINE CLINIC | Age: 45
End: 2020-08-18
Payer: COMMERCIAL

## 2020-08-18 PROCEDURE — 99214 OFFICE O/P EST MOD 30 MIN: CPT | Performed by: NURSE PRACTITIONER

## 2020-08-18 RX ORDER — CITALOPRAM 20 MG/1
20 TABLET ORAL DAILY
Qty: 30 TABLET | Refills: 5 | Status: SHIPPED | OUTPATIENT
Start: 2020-08-18 | End: 2020-11-02 | Stop reason: SDUPTHER

## 2020-08-18 NOTE — PROGRESS NOTES
2020    TELEHEALTH EVALUATION -- Audio/Visual (During IMXAF-20 public health emergency)    HPI:    Ariel Pearson (:  1975) has requested an audio/video evaluation for the following concern(s): Hot flashes, not feeling well and joint pain    Patient has complaints today of continued hot flashes, fatigue and joint pain and swelling. She complains of a 3 week history intense joint pain in her hands, knees, hips and elbows. Patient states her symptoms mostly occur at night which makes it even more difficult for her to get good sleep. She did try taking Tylenol PM which she states was ineffective. She did see her OB/GYN and was started on estrogen supplement which is helping some with her hot flashes but they are not completely gone. Review of Systems    Prior to Visit Medications    Medication Sig Taking? Authorizing Provider   citalopram (CELEXA) 20 MG tablet Take 1 tablet by mouth daily Yes JUANITA Nguyen CNP   albuterol sulfate HFA (VENTOLIN HFA) 108 (90 Base) MCG/ACT inhaler Inhale 2 puffs into the lungs 4 times daily as needed for Wheezing Yes Yaquelin Kat MD   montelukast (SINGULAIR) 10 MG tablet TAKE 1 TABLET BY MOUTH DAILY Yes JUANITA Nguyen CNP   citalopram (CELEXA) 10 MG tablet Take 1 tablet by mouth daily Yes JUANITA Nguyen CNP   methylPREDNISolone (MEDROL, KAYLA,) 4 MG tablet Take by mouth as directed.  Yes Hai Marcelo MD   pantoprazole (PROTONIX) 20 MG tablet Take 1 tablet by mouth every morning (before breakfast) Yes JUANITA Nguyen CNP   Cetirizine HCl (ZYRTEC PO) Take by mouth Yes Historical Provider, MD   fluticasone (FLONASE) 50 MCG/ACT nasal spray 1 spray by Nasal route daily Yes Yaquelin Kat MD   Dextromethorphan-Guaifenesin (MUCINEX DM MAXIMUM STRENGTH)  MG TB12 Take 1 tablet by mouth 2 times daily Yes Yaquelin Kat MD   naproxen (NAPROSYN) 500 MG tablet Take 1 tablet by mouth 2 times daily (with meals) Yes Donna Nguyen MD   ondansetron (ZOFRAN) 4 MG tablet Take 1 tablet by mouth every 8 hours as needed for Nausea or Vomiting Yes Roopa Cardoza MD   diphenhydrAMINE (BENADRYL) 25 MG capsule Take 25 mg by mouth every 6 hours as needed for Itching Yes Historical Provider, MD   citalopram (CELEXA) 10 MG tablet Take 10 mg by mouth  Historical Provider, MD       Social History     Tobacco Use    Smoking status: Never Smoker    Smokeless tobacco: Never Used   Substance Use Topics    Alcohol use: Yes     Alcohol/week: 0.0 standard drinks     Comment: socially (a couple glasses on the week)    Drug use: No            PHYSICAL EXAMINATION:  [ INSTRUCTIONS:  \"[x]\" Indicates a positive item  \"[]\" Indicates a negative item  -- DELETE ALL ITEMS NOT EXAMINED]  Vital Signs: (As obtained by patient/caregiver or practitioner observation)    Blood pressure-  Heart rate-    Respiratory rate-    Temperature-  Pulse oximetry-     Constitutional: [x] Appears well-developed and well-nourished [x] No apparent distress      [] Abnormal-   Mental status  [x] Alert and awake  [x] Oriented to person/place/time [x]Able to follow commands      Eyes:  EOM    [x]  Normal  [] Abnormal-  Sclera  [x]  Normal  [] Abnormal -         Discharge [x]  None visible  [] Abnormal -    HENT:   [x] Normocephalic, atraumatic.   [] Abnormal   [x] Mouth/Throat: Mucous membranes are moist.     External Ears [x] Normal  [] Abnormal-     Neck: [x] No visualized mass     Pulmonary/Chest: [x] Respiratory effort normal.  [x] No visualized signs of difficulty breathing or respiratory distress        [] Abnormal-      Neurological:        [x] No Facial Asymmetry (Cranial nerve 7 motor function) (limited exam to video visit)          [x] No gaze palsy        [] Abnormal-         Skin:        [x] No significant exanthematous lesions or discoloration noted on facial skin         [] Abnormal-            Psychiatric:       [x] Normal Affect [x] No Hallucinations        [] Abnormal-     Other pertinent observable physical exam findings-     ASSESSMENT/PLAN:  1. Anxiety  Mostly controlled. Patient is going to call and schedule counseling appointments. - citalopram (CELEXA) 20 MG tablet; Take 1 tablet by mouth daily  Dispense: 30 tablet; Refill: 5    2. Arthralgia, unspecified joint  Patient has somewhat vague complaints of joint pain, swelling and fatigue. No visible joint swelling or redness on hands visualized today. If patient was in office today would have drawn blood work including CRP, sed rate and [facility] however since this visit was virtual will refer to rheumatology for thorough evaluation and work-up. - Mayra Duque MD, Rheumatology, [facility]    3. Hand pain, not arthralgia, unspecified laterality  Patient complains of bilateral hand pain and numbness especially at night. Patient states she has also starting to feel the symptoms in her bilateral feet. Will start with EMG of all extremities and await results. - EMG; Future    4. Fatigue, unspecified type  Patient has had recent TSH which was normal.  Last CBC and CMP were both all normal.  Likely related to menopausal symptoms however given she also has complaints of joint pain and numbness will refer to rheumatology for thorough work-up. 5. Hot flashes  Improved some on estrogen prescribed by OB/GYN. Discussed there is and alternative herbal therapy called black cohosh that she may be interested in trying. We will also increase Celexa to 20 mg daily as it has shown some improvement in menopausal symptoms. No follow-ups on file. Bertha Clancy is a 39 y.o. female being evaluated by a Virtual Visit (video visit) encounter to address concerns as mentioned above. A caregiver was present when appropriate.  Due to this being a TeleHealth encounter ([facility] emergency), evaluation of the following organ systems was limited: Vitals/Constitutional/EENT/Resp/CV/GI//MS/Neuro/Skin/Heme-Lymph-Imm. Pursuant to the emergency declaration under the [address] [place] [address], [address] [place] [address] and the [employer] Act, this Virtual Visit was conducted with patient's (and/or legal guardian's) consent, to reduce the patient's risk of exposure to COVID-19 and provide necessary medical care. The patient (and/or legal guardian) has also been advised to contact this office for worsening conditions or problems, and seek emergency medical treatment and/or call 911 if deemed necessary. Patient identification was verified at the start of the visit: Yes    Total time spent on this encounter: Not billed by time    Services were provided through a video synchronous discussion virtually to substitute for in-person clinic visit. Patient and provider were located at their individual homes. --JUANITA Uriarte - CNP on 8/18/2020 at 2:14 PM    An electronic signature was used to authenticate this note.

## 2020-08-19 ENCOUNTER — TELEPHONE (OUTPATIENT)
Dept: RHEUMATOLOGY | Age: 45
End: 2020-08-19

## 2020-08-24 ENCOUNTER — HOSPITAL ENCOUNTER (OUTPATIENT)
Dept: NEUROLOGY | Age: 45
Discharge: HOME | End: 2020-08-24
Payer: COMMERCIAL

## 2020-08-24 PROCEDURE — 95910 NRV CNDJ TEST 7-8 STUDIES: CPT | Performed by: PHYSICAL MEDICINE & REHABILITATION

## 2020-08-24 PROCEDURE — 95885 MUSC TST DONE W/NERV TST LIM: CPT | Performed by: PHYSICAL MEDICINE & REHABILITATION

## 2020-08-24 NOTE — PROCEDURES
[address]      Electrodiagnostic Report  Test Date:  2020    Patient: Danny Weinberg : 1975 Physician: Hany Isaacs D.O.   Sex: Female Height:   Ref Phys: Sunday Concepcion, APRN-CNP     Patient Complaints:  Patient is a 39year-old female who presents with numbness tingling and weakness in the upper extremities intermittent in nature onset one year ago. symptoms worse at night. Patient History / Exam:  PMH: no endocrine disease, no neck or arm surgery PE: reflexes trace, + thumb opposition weakness    Impression: study is consistent with mild bilateral carpal tunnel syndrome, no evidence of an acute radiculopathy or other entrapment neuropathy. Thank you. NCV & EMG Findings:  Evaluation of the right median motor nerve showed reduced amplitude (3.0 mV). The left median sensory and the right median sensory nerves showed prolonged distal peak latency (L3.7, R3.7 ms). All remaining nerves (as indicated in the following tables) were within normal limits. Left vs. Right side comparison data for the median motor nerve indicates abnormal L-R latency difference (1.0 ms). All remaining left vs. right side differences were within normal limits. All examined muscles (as indicated in the following table) showed no evidence of electrical instability.           Hany Isaacs D.O.        Nerve Conduction Studies  Anti Sensory Summary Table     Stim Site NR Peak (ms) Norm Peak (ms) P-T Amp (µV) Norm P-T Amp Site1 Site2 Delta-P (ms) Dist (cm) Federico (m/s) Norm Federico (m/s)   Left Median Anti Sensory (2nd Digit)   Wrist    3.7 <3.6 107.0 >10 Wrist 2nd Digit 3.7 14.0 38    Right Median Anti Sensory (2nd Digit)   Wrist    3.7 <3.6 59.2 >10 Wrist 2nd Digit 3.7 14.0 38    Left Ulnar Anti Sensory (5th Digit)   Wrist    2.4 <3.7 64.9 >15.0 Wrist 5th Digit 2.4 14.0 58    Right Ulnar Anti Sensory (5th Digit)   Wrist    2.3 <3.7 66.6 >15.0 Wrist 5th Digit 2.3 14.0 61      Motor Summary Table     Stim Site NR Onset (ms) Norm Onset (ms) O-P Amp (mV) Norm O-P Amp Site1 Site2 Delta-0 (ms) Dist (cm) Federico (m/s) Norm Federico (m/s)   Left Median Motor (Abd Poll Brev)   Wrist    3.1 <4.3 6.9 >5 Elbow Wrist 3.1 19.0 61 >50   Elbow    6.2  5.6          Right Median Motor (Abd Poll Brev)   Wrist    4.1 <4.3 3.0 >5 Elbow Wrist 2.9 18.0 62 >50   Elbow    7.0  2.9          Left Ulnar Motor (Abd Dig Minimi)   Wrist    3.3 <4.2 4.3 >3 B Elbow Wrist 3.0 23.0 77 >50   B Elbow    6.3  4.9  A Elbow B Elbow 0.7 6.0 86 >50   A Elbow    7.0  4.7          Right Ulnar Motor (Abd Dig Minimi)   Wrist    3.2 <4.2 5.7 >3 B Elbow Wrist 3.0 20.0 67 >50   B Elbow    6.2  6.2  A Elbow B Elbow 0.8 5.0 63 >50   A Elbow    7.0  6.2            EMG     Side Muscle Nerve Root Ins Act Fibs Psw Amp Dur Poly Recrt Int Judithann Elrama Comment   Right Deltoid Axillary C5-6 Nml Nml Nml Nml Nml 0 Nml Nml    Right Biceps Musculocut C5-6 Nml Nml Nml Nml Nml 0 Nml Nml    Right Triceps Radial C6-7-8 Nml Nml Nml Nml Nml 0 Nml Nml    Right BrachioRad Radial C5-6 Nml Nml Nml Nml Nml 0 Nml Nml    Right PronatorTeres Median C6-7 Nml Nml Nml Nml Nml 0 Nml Nml    Right Ext Indicis Radial (Post Int) C7-8 Nml Nml Nml Nml Nml 0 Nml Nml    Right 1stDorInt Ulnar C8-T1 Nml Nml Nml Nml Nml 0 Nml Nml    Right Abd Poll Brev Median C8-T1 Nml Nml Nml Nml Nml 0 Nml Nml    Right Cervical Parasp Up Rami C1-3 Nml Nml Nml         Right Cervical Parasp Mid Rami C4-6 Nml Nml Nml         Right Cervical Parasp Low Rami C7-8 Nml Nml Nml         Left Deltoid Axillary C5-6 Nml Nml Nml Nml Nml 0 Nml Nml    Left Biceps Musculocut C5-6 Nml Nml Nml Nml Nml 0 Nml Nml    Left Triceps Radial C6-7-8 Nml Nml Nml Nml Nml 0 Nml Nml    Left BrachioRad Radial C5-6 Nml Nml Nml Nml Nml 0 Nml Nml    Left PronatorTeres Median C6-7 Nml Nml Nml Nml Nml 0 Nml Nml    Left Ext Indicis Radial (Post Int) C7-8 Nml Nml Nml Nml Nml 0 Nml Nml    Left 1stDorInt Ulnar C8-T1 Nml Nml Nml Nml Nml 0 Nml Nml    Left Abd [place] Brev Median C8-T1 Nml Nml Nml Nml Nml 0 Nml Nml    Left Cervical Parasp Up Rami C1-3 Nml Nml Nml         Left Cervical Parasp Mid Rami C4-6 Nml Nml Nml         Left Cervical Parasp Low Rami C7-8 Nml Nml Nml           Electronically signed by Leonardo Sifuentes DO on 8/24/2020 at 3:16 PM

## 2020-08-25 ENCOUNTER — TELEPHONE (OUTPATIENT)
Dept: FAMILY MEDICINE CLINIC | Age: 45
End: 2020-08-25

## 2020-08-25 NOTE — TELEPHONE ENCOUNTER
Please call patient and let her know that her EMG shows mild bilateral carpal tunnel syndrome. Please give her below information for to call and schedule an appointment with Dr. Megan Hill. [facility] - Blanquita Nolasco MD   [address] [facility] [place] [address]   Phone: [phone]     Please document call and then close encounter.   thanks

## 2020-08-26 ENCOUNTER — OFFICE VISIT (OUTPATIENT)
Dept: RHEUMATOLOGY | Age: 45
End: 2020-08-26
Payer: COMMERCIAL

## 2020-08-26 VITALS
OXYGEN SATURATION: 98 % | BODY MASS INDEX: 31.82 KG/M2 | HEIGHT: 66 IN | DIASTOLIC BLOOD PRESSURE: 70 MMHG | HEART RATE: 110 BPM | WEIGHT: 198 LBS | TEMPERATURE: 98.5 F | SYSTOLIC BLOOD PRESSURE: 102 MMHG

## 2020-08-26 DIAGNOSIS — M25.50 MULTIPLE JOINT PAIN: ICD-10-CM

## 2020-08-26 LAB
A/G RATIO: 1.5 (ref 1.1–2.2)
ALBUMIN SERPL-MCNC: 4.5 G/DL (ref 3.4–5)
ALP BLD-CCNC: 105 U/L (ref 40–129)
ALT SERPL-CCNC: 20 U/L (ref 10–40)
ANION GAP SERPL CALCULATED.3IONS-SCNC: 13 MMOL/L (ref 3–16)
AST SERPL-CCNC: 18 U/L (ref 15–37)
BASOPHILS ABSOLUTE: 0 K/UL (ref 0–0.2)
BASOPHILS RELATIVE PERCENT: 0.7 %
BILIRUB SERPL-MCNC: 0.3 MG/DL (ref 0–1)
BUN BLDV-MCNC: 11 MG/DL (ref 7–20)
C-REACTIVE PROTEIN: 7.6 MG/L (ref 0–5.1)
CALCIUM SERPL-MCNC: 10.1 MG/DL (ref 8.3–10.6)
CHLORIDE BLD-SCNC: 103 MMOL/L (ref 99–110)
CO2: 23 MMOL/L (ref 21–32)
CREAT SERPL-MCNC: 0.8 MG/DL (ref 0.6–1.1)
EOSINOPHILS ABSOLUTE: 0.1 K/UL (ref 0–0.6)
EOSINOPHILS RELATIVE PERCENT: 1.5 %
GFR AFRICAN AMERICAN: >60
GFR NON-AFRICAN AMERICAN: >60
GLOBULIN: 3.1 G/DL
GLUCOSE BLD-MCNC: 94 MG/DL (ref 70–99)
HCT VFR BLD CALC: 41.2 % (ref 36–48)
HEMOGLOBIN: 13.7 G/DL (ref 12–16)
LYMPHOCYTES ABSOLUTE: 2.1 K/UL (ref 1–5.1)
LYMPHOCYTES RELATIVE PERCENT: 36.6 %
MCH RBC QN AUTO: 30.4 PG (ref 26–34)
MCHC RBC AUTO-ENTMCNC: 33.2 G/DL (ref 31–36)
MCV RBC AUTO: 91.5 FL (ref 80–100)
MONOCYTES ABSOLUTE: 0.4 K/UL (ref 0–1.3)
MONOCYTES RELATIVE PERCENT: 7.2 %
NEUTROPHILS ABSOLUTE: 3.1 K/UL (ref 1.7–7.7)
NEUTROPHILS RELATIVE PERCENT: 54 %
PDW BLD-RTO: 13.2 % (ref 12.4–15.4)
PLATELET # BLD: 230 K/UL (ref 135–450)
PMV BLD AUTO: 8.9 FL (ref 5–10.5)
POTASSIUM SERPL-SCNC: 4.4 MMOL/L (ref 3.5–5.1)
RBC # BLD: 4.5 M/UL (ref 4–5.2)
SEDIMENTATION RATE, ERYTHROCYTE: 20 MM/HR (ref 0–20)
SODIUM BLD-SCNC: 139 MMOL/L (ref 136–145)
TOTAL PROTEIN: 7.6 G/DL (ref 6.4–8.2)
WBC # BLD: 5.7 K/UL (ref 4–11)

## 2020-08-26 PROCEDURE — 99244 OFF/OP CNSLTJ NEW/EST MOD 40: CPT | Performed by: INTERNAL MEDICINE

## 2020-08-26 RX ORDER — NORETHINDRONE ACETATE AND ETHINYL ESTRADIOL 1; 5 MG/1; UG/1
1 TABLET ORAL DAILY
COMMUNITY
Start: 2020-07-28 | End: 2021-01-06

## 2020-08-26 NOTE — PROGRESS NOTES
Eden Martin MD  [facility] - Rheumatology-[place]      RHEUMATOLOGY CONSULT NOTE    HISTORY OF PRESENT ILLNESS:    The pt is a 39 y.o. female referred by Thuy Faith MD for evaluation due to articular pain. Patient states having pain practically all over her body affecting mainly the hands, wrists, elbows, shoulders, neck, lower back (herniated discs), hips, knees (crepitus), ankles and toes; some of the joints such as hands and feet can get warm, red and swollen and morning stiffness may last more than 1 hour. Pain started about 2 to 3 years ago but it has been worse for the last 3 months with sweating that wakes her up in the middle of the night. Naproxen 500 mg up to twice a day provided some relief only for the back pain but she has not been taking this medication for the last 3 months. There is history of fatigue, non-refreshing sleep and cognitive problems with occasional numbness, tingling and burning sensation in the hands. Previous EMG showed bilateral carpal tunnel syndrome. No recent pertinent labs for articular pain such as RF, CCP, ESR and CRP. Currently on citalopram 30 mg     No family history of autoimmune conditions. 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 No recent falls or devices to ambulate. Previous available records/referral and labs were reviewed.       REVIEW OF SYSTEMS:    Constitutional: denies chronic fever/chills, night sweats, unintentional weight loss  Integumentary: denies photosensitivity, rash, patchy alopecia, or Sx of Raynaud's phenomenon  Eyes: denies dry eyes, redness or pain, visual disturbance, or floaters  Ears: denies hearing loss, tinnitus, vertigo, or recurrent ear infections  Nose: denies nasal ulcers or recurrent sinusitis  Oral cavity: denies dry mouth or oral ulcers  Cardiovascular: denies CP, palpitations, Hx of pericardial effusion or pericarditis  Respiratory: denies SOB, cough, hemoptysis, or pleurisy  Gastrointestinal: denies heart burn, dysphagia or esophageal dysmotility, denies change in bowel habits or Sx of IBD  Genitourinary: denies change in urine amount or urine appearance, denies frothy urine or Hx of nephrolithiasis  Hematologic/Lymphatic: denies abnormal bruising or bleeding, denies Hx of blood clots or recurrent miscarriages, denies swollen LNs  Musculoskeletal:  refer to above HPI   Neurological: denies focal weakness, paresthesias/hyperesthesias or change in sensation, denies Hx of seizure, denies change in gait, balance, or memory  Psychiatric: denies Hx of depression or anxiety  Endocrine: denies cold or heat intolerance  Allergic/Immunologic: denies nasal congestion, chronic asthma, or hives    Past Medical History:   Diagnosis Date    ADHD     Anxiety     Kidney stone     MDD (major depressive disorder)     has been on zoloft, paxil, celexa. (had severe HAs, and nausea on wellbutrin)    Seasonal allergies         Past Surgical History:   Procedure Laterality Date    ABDOMINAL EXPLORATION SURGERY      BREAST ENHANCEMENT SURGERY      TUBAL LIGATION         Social History     Socioeconomic History    Marital status:      Spouse name: Not on file    Number of children: Not on file    Years of education: Not on file    Highest education level: Not on file   Occupational History    Not on file   Social Needs    Financial resource strain: Not on file    Food insecurity     Worry: Not on file     Inability: Not on file    Transportation needs     Medical: Not on file     Non-medical: Not on file   Tobacco Use    Smoking status: Never Smoker    Smokeless tobacco: Never Used   Substance and Sexual Activity    Alcohol use:  Yes     Alcohol/week: 0.0 standard drinks     Comment: socially (a couple glasses on the week)    Drug use: No    Sexual activity: Yes     Partners: Male     Comment: , 2 children   Lifestyle    Physical activity     Days per week: Not on file     Minutes per session: Not on file    Stress: Not on file   Relationships    Social connections     Talks on phone: Not on file     Gets together: Not on file     Attends Orthodoxy service: Not on file     Active member of club or organization: Not on file     Attends meetings of clubs or organizations: Not on file     Relationship status: Not on file    Intimate partner violence     Fear of current or ex partner: Not on file     Emotionally abused: Not on file     Physically abused: Not on file     Forced sexual activity: Not on file   Other Topics Concern    Not on file   Social History Narrative    Caffeine:        SODA - 0          TEA - 0        COFFEE - 1 cup in the morning        Family History   Problem Relation Age of Onset    Colon Cancer Mother     Diabetes Father     Diabetes Maternal Aunt     Diabetes Maternal Grandmother     Heart Disease Paternal Grandmother     Diabetes Maternal Aunt     Diabetes Maternal Aunt     Diabetes Maternal Aunt     Diabetes Maternal Aunt     Diabetes Maternal Aunt        MEDICATIONS:    Current Outpatient Medications:     norethindrone-ethinyl estradiol (JINTELI) 1-5 MG-MCG TABS per tablet, Take 1 tablet by mouth daily, Disp: , Rfl:     citalopram (CELEXA) 20 MG tablet, Take 1 tablet by mouth daily, Disp: 30 tablet, Rfl: 5    albuterol sulfate HFA (VENTOLIN HFA) 108 (90 Base) MCG/ACT inhaler, Inhale 2 puffs into the lungs 4 times daily as needed for Wheezing, Disp: 1 Inhaler, Rfl: 0    montelukast (SINGULAIR) 10 MG tablet, TAKE 1 TABLET BY MOUTH DAILY, Disp: 90 tablet, Rfl: 1    pantoprazole (PROTONIX) 20 MG tablet, Take 1 tablet by mouth every morning (before breakfast), Disp: 30 tablet, Rfl: 0    Cetirizine HCl (ZYRTEC PO), Take by mouth, Disp: , Rfl:     fluticasone (FLONASE) 50 MCG/ACT nasal spray, 1 spray by Nasal route daily, Disp: 1 Bottle, Rfl: 3    Dextromethorphan-Guaifenesin (MUCINEX DM MAXIMUM STRENGTH)  MG TB12, Take 1 tablet by mouth 2 times daily, Disp: 28 tablet, Rfl: 0    naproxen (NAPROSYN) 500 MG tablet, Take 1 tablet by mouth 2 times daily (with meals), Disp: 30 tablet, Rfl: 0    ondansetron (ZOFRAN) 4 MG tablet, Take 1 tablet by mouth every 8 hours as needed for Nausea or Vomiting, Disp: 30 tablet, Rfl: 0    diphenhydrAMINE (BENADRYL) 25 MG capsule, Take 25 mg by mouth every 6 hours as needed for Itching, Disp: , Rfl:     ALLERGIES:  Trazodone; Pcn [penicillins];  Cephalosporins; and Wellbutrin [bupropion]    PHYSICAL EXAM:    Vitals:    /70 (Site: Right Upper Arm, Position: Sitting, Cuff Size: Medium Adult)   Pulse 110   Temp 98.5 °F (36.9 °C)   Ht 5' 6\" (1.676 m)   Wt 198 lb (89.8 kg)   SpO2 98%   BMI 31.96 kg/m²     GEN: AAOx3, in NAD, well-appearing  HEAD: normocephalic, atraumatic  EYES: EOMI, PERRLA, no injection or icterus  NOSE: no nasal ulcers or nasal drainage  ORAL CAVITY: moist oral mucosa w/ good saliva pooling, no oral lesions, no evidence of thrush, no evidence of parotid gland enlargement  NECK: supple w/ FROM, of evidence of lymphadenopathy  CVS: RRR, no murmurs rubs or gallops, no JVD, 2+ distal pulses b/l  LUNGS: in no acute respiratory distress, CTAB  ABDOMEN: +BS, soft, NT/ND, no evidence of organomegaly  LYMPH: no cervical, supraclavicular or axillary LAD  MSK:  Spine: no kyphosis or lordosis, axial spine w/ FROM, no paraspinal muscle or vertebral tenderness, SI joints NTTP  Upper extremities:   Pain to palpation in the MCP joints but there is no pepe synovitis   Elbow: no synovitis or bursitis, FROM   Shoulders: no pain or swelling or warmth on palpation, FROM  Lower extremities:   Hip: normal log roll, negative JESSE test b/l, trochanteric bursa NTTP b/l   Knees: no warmth or effusion present, FROM   Ankles: no synovitis, FROM, Achilles tendons w/o swelling or warmth NTTP   Feet: no toe swelling or pain or warmth on palpation w/ FROM, negative MTP squeeze test              16 out of 18 tender question fibromyalgia. NEURO: CN II-XII grossly intact intact, face appears symmetrical, normal DTR, no evidence of muscle atrophy, 5/5 strength proximally and distally throughout in both upper and lower extremities, touch sensation grossly intact  INTEGUMENTARY: no rash or psoriatic lesions, no petechiae, bruises, or palpable purpura, no patchy alopecia, no nail or periungual changes, no clubbing or digital ulcers  PSYCH: normal mood    Labs: I personally reviewed prior labs including:    Lab Results   Component Value Date    WBC 6.3 02/27/2020    HGB 12.7 02/27/2020    HCT 37.5 02/27/2020    MCV 92.5 02/27/2020     02/27/2020    LYMPHOPCT 26.2 02/27/2020    RBC 4.05 02/27/2020    MCH 31.3 02/27/2020    MCHC 33.8 02/27/2020    RDW 12.9 02/27/2020     Lab Results   Component Value Date     02/27/2020    K 4.5 02/27/2020     02/27/2020    CO2 26 02/27/2020    BUN 12 02/27/2020    CREATININE 0.7 02/27/2020    GLUCOSE 85 02/27/2020    CALCIUM 9.2 02/27/2020    PROT 6.6 02/27/2020    LABALBU 4.0 02/27/2020    BILITOT 0.3 02/27/2020    ALKPHOS 91 02/27/2020    AST 16 02/27/2020    ALT 19 02/27/2020    LABGLOM >60 02/27/2020    GFRAA >60 02/27/2020    AGRATIO 1.5 02/27/2020    GLOB 2.6 02/27/2020             ASSESSMENT/PLAN:       1. Multiple joint pain  Current arthralgias affecting mainly the hands but there is no pepe synovitis  No previous evaluation for an autoimmune/inflammatory arthropathy  AVISE was requested as well as the following labs  - C-Reactive Protein; Future  - Sedimentation Rate; Future  - Comprehensive Metabolic Panel; Future  - CBC Auto Differential; Future    2. Other fatigue  TSH was reported as normal  Fibromyalgia related? 3. Raynaud's disease without gangrene  Minimally symptomatic  No signs of a secondary cause such as tightness of the skin, periungual telangiectasias, digital pitting or ulcers and there is amatory  AVISE was requested  Fibromyalgia related?     4. tablet Take 1 tablet by mouth every 8 hours as needed for Nausea or Vomiting 30 tablet 0    diphenhydrAMINE (BENADRYL) 25 MG capsule Take 25 mg by mouth every 6 hours as needed for Itching      [DISCONTINUED] citalopram (CELEXA) 10 MG tablet Take 1 tablet by mouth daily 90 tablet 1    [DISCONTINUED] methylPREDNISolone (MEDROL, KAYLA,) 4 MG tablet Take by mouth as directed. 1 kit 0    [DISCONTINUED] citalopram (CELEXA) 10 MG tablet Take 10 mg by mouth       No facility-administered encounter medications on file as of 8/26/2020. Return 2 weeks. 8/26/2020 11:43 AM      Sending consult note to referring provider Justyn Thornton MD.    Thank you very much for allowing me to participate in this pt's care. Please do not hesitate to contact me if I can be of further assistance. NOTE: This report is transcribed by using voice recognition software dragon. Every effort is made to ensure accuracy; however, inadvertent computerized transcription errors may be present.            Sumit Andersen MD

## 2020-08-26 NOTE — PATIENT INSTRUCTIONS
Instructions. \"     If you do not have an account, please click on the \"Sign Up Now\" link. Current as of: June 26, 2019               Content Version: 12.5  © 2006-2020 [employer]wise, [employer]. Care instructions adapted under license by [facility] ([facility]). If you have questions about a medical condition or this instruction, always ask your healthcare professional. [address] any warranty or liability for your use of this information. Patient Education        Fibromyalgia: Care Instructions  Overview     Fibromyalgia is a painful condition that is not completely understood by medical experts. The cause of fibromyalgia is not known. It can make you feel tired and ache all over. It causes tender spots at specific points of the body that hurt only when you press on them. You may have trouble sleeping, as well as other symptoms. These problems can upset your work and home life. Symptoms tend to come and go, although they may never go away completely. Fibromyalgia does not harm your muscles, joints, or organs. Follow-up care is a key part of your treatment and safety. Be sure to make and go to all appointments, and call your doctor if you are having problems. It's also a good idea to know your test results and keep a list of the medicines you take. How can you care for yourself at home? · Exercise often. Walk, swim, or bike to help with pain and sleep problems and to make you feel better. · Try to get a good night's sleep. Go to bed and get up at the same time each day, whether you feel rested or not. Make sure you have a good mattress and pillow. · Reduce stress. Avoid things that cause you stress, if you can. If not, work at making them less stressful. Learn to use biofeedback, guided imagery, meditation, or other methods to relax. · Make healthy changes. Eat a balanced diet, quit smoking, and limit alcohol and caffeine.   · Use a heating pad set on low or take warm baths or showers

## 2020-08-31 ENCOUNTER — TELEPHONE (OUTPATIENT)
Dept: RHEUMATOLOGY | Age: 45
End: 2020-08-31

## 2020-09-04 NOTE — TELEPHONE ENCOUNTER
AVISE negative, other than low count positive [facility]. Which can be normal findings. I really can not recommend any further plan without seeing.

## 2020-09-10 ENCOUNTER — OFFICE VISIT (OUTPATIENT)
Dept: RHEUMATOLOGY | Age: 45
End: 2020-09-10
Payer: COMMERCIAL

## 2020-09-10 VITALS — HEIGHT: 66 IN | TEMPERATURE: 97.7 F | WEIGHT: 198 LBS | BODY MASS INDEX: 31.82 KG/M2

## 2020-09-10 PROCEDURE — 99215 OFFICE O/P EST HI 40 MIN: CPT | Performed by: INTERNAL MEDICINE

## 2020-09-10 NOTE — PROGRESS NOTES
[address] Lucio MD                                                                                                                [address] [facility], [facility] [address]                                                [phone] ([phone]      Primary provider: Niels Lazo MD  Patient identification: Elder Arzate,: 1975,Sex: female         ASSESSMENT/PLAN:  Cassandra Quinn was seen today for follow-up. Diagnoses and all orders for this visit:    Myofascial pain    Screening for rheumatic disorder    Dr. Jennifer Contreras patient is here to establish care. Clinical assessment is suggestive of chronic musculoskeletal discomfort-noninflammatory from fibromyalgia-approximately 2 years duration. No evidence of systemic inflammatory arthritis. Has low titer positive [facility], negative sub-serologies, RF, CCP-AVISE panel scanned in media section. She also has hot flashes, is on Celexa. Bilateral hand paresthesias likely from median nerve compression, recommend using brace at night. If symptoms are persistent, recommend nerve conduction study. Plan-  Reassured patient that she does not have systemic inflammatory arthritis or any systemic inflammatory rheumatic disorders at this time. Low titer positive MERCY of unclear clinical significance. Current symptomatology is from myofascial pain, therefore recommend changing Celexa to Cymbalta 30 mg a day as a starting dose. Follow-up with primary care physician for further management of fibromyalgia. Patient indicates understanding and agrees with the management plan. I reviewed patients medical information and medical history in the medical records. Note is transcribed using voice recognition software.  Inadvertent computerized transcription errors may be present. ##################################################################    Subjective: Follow-up for generalized musculoskeletal discomfort. She was seen by 1 of our colleagues who recently passed away. She tells me that she has generalized musculoskeletal discomfort almost everywhere in her body from neck to the toes-worse in the back muscles, hips, knees, elbows, associated with stiffness, soreness, just uncomfortable feeling. Finger joints, wrists go numb, tingly and achy and sore, symptoms are worse at night, shakes hands to feel better. She also tells that whole body is swollen mainly upper and lower extremities and hips. No specific swelling of her small or intermediate joints. Because of aches and pains she was evaluated by rheumatology, work-up shows positive MERCY with all negative sub-serologies. RF, CCP negative borderline elevated CRP with normal sed rate. No psoriasis or inflammatory bowel diseases. She was also experiencing hot flashes, has gotten better on Celexa. Estrogen patches did not help. She is not menopausal.  She takes naproxen as needed for her musculoskeletal symptoms helps some. All other 12 system review of systems are negative. No rashes, photosensitivity, focal alopecia, mucositis, chest pain or shortness of breath, blood clots, Raynaud's phenomenon, focal muscle or joint weakness. Past Medical History:   Diagnosis Date    ADHD     Anxiety     Kidney stone     MDD (major depressive disorder)     has been on zoloft, paxil, celexa. (had severe HAs, and nausea on wellbutrin)    Seasonal allergies      Past Surgical History:   Procedure Laterality Date    ABDOMINAL EXPLORATION SURGERY      BREAST ENHANCEMENT SURGERY      TUBAL LIGATION       Person, family history reviewed and updated.   Current Outpatient Medications   Medication Sig Dispense Refill    norethindrone-ethinyl estradiol (JINTELI) 1-5 MG-MCG TABS per tablet Take 1 tablet by mouth daily      citalopram (CELEXA) 20 MG tablet Take 1 tablet by mouth daily 30 tablet 5    albuterol sulfate HFA (VENTOLIN HFA) 108 (90 Base) MCG/ACT inhaler Inhale 2 puffs into the lungs 4 times daily as needed for Wheezing 1 Inhaler 0    montelukast (SINGULAIR) 10 MG tablet TAKE 1 TABLET BY MOUTH DAILY 90 tablet 1    pantoprazole (PROTONIX) 20 MG tablet Take 1 tablet by mouth every morning (before breakfast) 30 tablet 0    Cetirizine HCl (ZYRTEC PO) Take by mouth      fluticasone (FLONASE) 50 MCG/ACT nasal spray 1 spray by Nasal route daily 1 Bottle 3    Dextromethorphan-Guaifenesin (MUCINEX DM MAXIMUM STRENGTH)  MG TB12 Take 1 tablet by mouth 2 times daily 28 tablet 0    naproxen (NAPROSYN) 500 MG tablet Take 1 tablet by mouth 2 times daily (with meals) 30 tablet 0    ondansetron (ZOFRAN) 4 MG tablet Take 1 tablet by mouth every 8 hours as needed for Nausea or Vomiting 30 tablet 0    diphenhydrAMINE (BENADRYL) 25 MG capsule Take 25 mg by mouth every 6 hours as needed for Itching       No current facility-administered medications for this visit. Allergies   Allergen Reactions    Trazodone Nausea Only and Shortness Of Breath    Pcn [Penicillins] Rash    Cephalosporins Hives    Wellbutrin [Bupropion] Other (See Comments)         OBJECTIVE  Physical    Vitals:    09/10/20 1116   Temp: 97.7 °F (36.5 °C)       General appearance/psychiatric: Well nourished and well groomed, normal judgment. Alert, appears stated age and cooperative. MKS: Subjective discomfort in her knees, or aspect of hips, lumbar and cervical paraspinal muscles and elbows. I do not appreciate any objective findings of any tender, swollen or inflamed joints in upper or lower extremities, full range of motion all peripheral joints. No specific fibromyalgia tender points, has generalized mild hyperesthesias. Normal gait, muscle strength in the upper and lower extremities bilaterally. Skin: No rash, induration or skin thickening.   HEENT: Normal lids/conjunctiva and pupils. No oral or nasal ulcers. Salivary glands reveals no evidence of abnormality. Chest: Normal effort, clear to auscultation. Heart:  Normal s1/s2, no leg edema. Neurologic: normal deep tendon reflexes. No foot or wrist drop. Data:  Lab Results   Component Value Date    WBC 5.7 08/26/2020    RBC 4.50 08/26/2020    HGB 13.7 08/26/2020    HCT 41.2 08/26/2020     08/26/2020    MCV 91.5 08/26/2020    MCH 30.4 08/26/2020    MCHC 33.2 08/26/2020    RDW 13.2 08/26/2020    LYMPHOPCT 36.6 08/26/2020    MONOPCT 7.2 08/26/2020    BASOPCT 0.7 08/26/2020    MONOSABS 0.4 08/26/2020    LYMPHSABS 2.1 08/26/2020    EOSABS 0.1 08/26/2020    BASOSABS 0.0 08/26/2020       Chemistry        Component Value Date/Time     08/26/2020 1222    K 4.4 08/26/2020 1222     08/26/2020 1222    CO2 23 08/26/2020 1222    BUN 11 08/26/2020 1222    CREATININE 0.8 08/26/2020 1222        Component Value Date/Time    CALCIUM 10.1 08/26/2020 1222    ALKPHOS 105 08/26/2020 1222    AST 18 08/26/2020 1222    ALT 20 08/26/2020 1222    BILITOT 0.3 08/26/2020 1222          Lab Results   Component Value Date    SEDRATE 20 08/26/2020            I thank you for giving me the opportunity to be involved in Karsten Arzate's care and I look forward following Karsten along with you. If you have any questions or concerns please feel free to contact me at any time.     Jose Elias Camilo MD 09/10/20

## 2020-11-02 RX ORDER — CITALOPRAM 20 MG/1
20 TABLET ORAL DAILY
Qty: 90 TABLET | Refills: 1 | Status: SHIPPED | OUTPATIENT
Start: 2020-11-02 | End: 2021-09-30 | Stop reason: CLARIF

## 2021-01-05 ENCOUNTER — TELEPHONE (OUTPATIENT)
Dept: FAMILY MEDICINE CLINIC | Age: 46
End: 2021-01-05

## 2021-01-05 NOTE — TELEPHONE ENCOUNTER
Ok to schedule with Dr. Ehsan Hartley tomorrow at 3:20 spot. If that does not work for patient ok to schedule with me on Thursday. Patient may need short term benzo which I cannot prescribe so would recommend visit with Dr. Ehsan Hartley. Please document call and then close encounter.   thanks

## 2021-01-05 NOTE — PROGRESS NOTES
PROGRESS NOTE     Piedad Weiss MD  [facility] [place] [address]  [phone] office  [phone] fax    Date of Service:  1/6/2021    Subjective:      Patient ID: . Quentin Atkins is a 39 y.o. female      CC: anxiety    HPI  Anxiety  45-year-old white female with generalized anxiety disorder, presenting with a month of worsening anxiety, intrusive thoughts, and panic attacks. She states all day long at work, she feels that she has intrusive thoughts that she cannot control. She worries that she will say the wrong thing, or type the wrong thing. She tries to avoid doing work sometimes, and stays physically busy, by doing laundry and other house chores, to prevent having to deal with these thoughts. She has been more anxious than usual, and has had more frequent panic attacks, for the last week. She is also struggling with insomnia, and feels that the lack of sleep makes it hard to concentrate during the day as well. She denies any depressed mood, anhedonia, feelings of hopelessness, suicidal ideation. She does have a pretty significant trauma history, but has never discussed this with myself, only with Dr. Lou Ellis in the past.  Was recommended to have intensive PTSD therapy in the past, but was not ready for this at that time. Vitals:    01/06/21 1525   BP: 95/61   Pulse: 99   Temp: 97.7 °F (36.5 °C)   TempSrc: Infrared   Weight: 205 lb (93 kg)   Height: 5' 6\" (1.676 m)       Outpatient Medications Marked as Taking for the 1/6/21 encounter (Office Visit) with Ndaia Hudson MD   Medication Sig Dispense Refill    clonazePAM (KLONOPIN) 0.5 MG tablet Take 1 tablet by mouth 2 times daily for 30 days.  60 tablet 0    citalopram (CELEXA) 40 MG tablet Take 1 tablet by mouth daily 30 tablet 0    citalopram (CELEXA) 20 MG tablet Take 1 tablet by mouth daily 90 tablet 1    montelukast (SINGULAIR) 10 MG tablet TAKE 1 TABLET BY MOUTH DAILY 90 tablet 1    Cetirizine HCl (ZYRTEC PO) Take by mouth      fluticasone (FLONASE) 50 MCG/ACT nasal spray 1 spray by Nasal route daily 1 Bottle 3    Dextromethorphan-Guaifenesin (MUCINEX DM MAXIMUM STRENGTH)  MG TB12 Take 1 tablet by mouth 2 times daily 28 tablet 0    diphenhydrAMINE (BENADRYL) 25 MG capsule Take 25 mg by mouth every 6 hours as needed for Itching         Past Medical History:   Diagnosis Date    ADHD     Anxiety     Kidney stone     MDD (major depressive disorder)     has been on zoloft, paxil, celexa. (had severe HAs, and nausea on wellbutrin)    Seasonal allergies        Past Surgical History:   Procedure Laterality Date    ABDOMINAL EXPLORATION SURGERY      BREAST ENHANCEMENT SURGERY      TUBAL LIGATION         Social History     Tobacco Use    Smoking status: Never Smoker    Smokeless tobacco: Never Used   Substance Use Topics    Alcohol use: Yes     Alcohol/week: 0.0 standard drinks     Comment: socially (a couple glasses on the week)       Family History   Problem Relation Age of Onset    Colon Cancer Mother     Diabetes Father     Diabetes Maternal Aunt     Diabetes Maternal Grandmother     Heart Disease Paternal Grandmother     Diabetes Maternal Aunt     Diabetes Maternal Aunt     Diabetes Maternal Aunt     Diabetes Maternal Aunt     Diabetes Maternal Aunt            Review of Systems  See hpi    Objective:   Constitutional:   · Reviewed vitals above  · Well Nourished, well developed, no distress         Psych:  · +anxious  · Normal insight and judgement    Assessment / Plan:     1. Anxiety  Not controlled. Patient initially asked for ADHD medication to help her concentrate, as she is used this for concentration issues in the past.  We did very long discussion, explaining how in this situation, her uncontrolled anxiety seems to be causing the majority of the concentration issue. I do not want to add a stimulant to the mix at this time.     Instead recommend maximizing her SSRI to help with her anxiety and intrusion thoughts. Also adding a short-term benzodiazepine to help with her uncontrolled anxiety and insomnia until the SSRI has time to kick in. Discussed it can take 4 to 6 weeks after the increase of the SSRI to see full benefit. Patient was agreeable to this plan. Will increase Celexa to 40 mg.  Reviewed side effects and expected course. Prescribing Klonopin 0.5 mg.  Okay to take twice daily for now, with the plan to eventually wean down to 2-3 times a week at most.  It may take a couple months to get to this point. Did speak with Dr. Lisandra Pabon, psychologist, about patient as well. She had seen Dr. Lisandra Pabon in the past.  Dr. Lisandra Pabon recommends a video visit consult to decide what type of therapy patient is currently ready for. Patient is agreeable. Patient to schedule a 1 month video visit follow-up with myself, to reevaluate and see how she is doing. 20 min was spent during the entire encounter, including pre and post charting.

## 2021-01-06 ENCOUNTER — OFFICE VISIT (OUTPATIENT)
Dept: FAMILY MEDICINE CLINIC | Age: 46
End: 2021-01-06
Payer: COMMERCIAL

## 2021-01-06 VITALS
HEART RATE: 99 BPM | WEIGHT: 205 LBS | BODY MASS INDEX: 32.95 KG/M2 | DIASTOLIC BLOOD PRESSURE: 61 MMHG | SYSTOLIC BLOOD PRESSURE: 95 MMHG | TEMPERATURE: 97.7 F | HEIGHT: 66 IN

## 2021-01-06 DIAGNOSIS — F41.9 ANXIETY: Primary | ICD-10-CM

## 2021-01-06 PROCEDURE — 99213 OFFICE O/P EST LOW 20 MIN: CPT | Performed by: FAMILY MEDICINE

## 2021-01-06 RX ORDER — CLONAZEPAM 0.5 MG/1
0.5 TABLET ORAL 2 TIMES DAILY
Qty: 60 TABLET | Refills: 0 | Status: SHIPPED | OUTPATIENT
Start: 2021-01-06 | End: 2022-01-25

## 2021-01-06 RX ORDER — CITALOPRAM 40 MG/1
40 TABLET ORAL DAILY
Qty: 30 TABLET | Refills: 0 | Status: SHIPPED | OUTPATIENT
Start: 2021-01-06 | End: 2021-02-02

## 2021-01-21 ENCOUNTER — TELEMEDICINE (OUTPATIENT)
Dept: PSYCHOLOGY | Age: 46
End: 2021-01-21
Payer: COMMERCIAL

## 2021-01-21 DIAGNOSIS — F41.1 GAD (GENERALIZED ANXIETY DISORDER): Primary | ICD-10-CM

## 2021-01-21 DIAGNOSIS — F41.0 PANIC DISORDER: ICD-10-CM

## 2021-01-21 PROCEDURE — 90791 PSYCH DIAGNOSTIC EVALUATION: CPT | Performed by: PSYCHOLOGIST

## 2021-01-21 NOTE — PROGRESS NOTES
Behavioral Health Consultation  Pillo Judd PsyD  Psychologist  1/21/2021  9:03 AM    NOTE - this visit conducted via audiovisual means : MyChart, Doxy, etc, in accordance with CMS guidelines. During JIWGK-60 public health emergency. Visit initiated at patient or caregiver request with permission to bill to insurer granted. Telemedicine APA guidelines were reviewed and discussed. Patient gave verbal consent for teleservices and will sign a consent form when feasible. Location of provider: [facility]   Location of patient: home    Time spent with Patient: 35 minutes  This is patient's first  [facility] appointment. Reason for Consult:  Panic disorder; JUAN M, R/O PTSD? Referring Provider: Lila Qureshi MD  [address] [place] [address] [facility]    Feedback given to PCP. S:    Last appt: 1/24/18. Returning for another episode of care. Panic attacks in the middle of the night, can't get back to sleep until 5 am. Happening before COVID. 4 of 7 nights which is what brought pt to PCP since 1/6. Increased medication, no panic since then, less mind racing. Valium at bedtime. Psych med: celexa 40 mg, Valium at night, 2 weeks into adjustment. Work: continues to be a \"trigger\" for pt. \"Crazy, not logical\", had fears as child as well, fears \"not being smart enough to do the work\", almost paralyzing \"causing me to struggle\", afraid to talk in meetings, get nervous when speaking up, afraid \"not making sense\". Translates to writing, \"can't concentrate\", then get up and \"leave\". But working from home, can get up and leave to avoid conflict. Discussed benefits of referral for \"full dose\" psychotherapy: agreed that treatment goals could be focused on sleep and assertive communication, tends to flight or freeze reactions in daily life. Went to ER 2 x for anxiety related problems.      O:  MSE:    Appearance    alert, cooperative  Appetite abnormal: poor, overeating   Sleep Never Smoker    Smokeless tobacco: Never Used   Substance and Sexual Activity    Alcohol use: Yes     Alcohol/week: 0.0 standard drinks     Comment: socially (a couple glasses on the week)    Drug use: No    Sexual activity: Yes     Partners: Male     Comment: , 2 children   Lifestyle    Physical activity     Days per week: Not on file     Minutes per session: Not on file    Stress: Not on file   Relationships    Social connections     Talks on phone: Not on file     Gets together: Not on file     Attends Spiritism service: Not on file     Active member of club or organization: Not on file     Attends meetings of clubs or organizations: Not on file     Relationship status: Not on file    Intimate partner violence     Fear of current or ex partner: Not on file     Emotionally abused: Not on file     Physically abused: Not on file     Forced sexual activity: Not on file   Other Topics Concern    Not on file   Social History Narrative    Caffeine:        SODA - 0          TEA - 0        COFFEE - 1 cup in the morning       TOBACCO:   reports that she has never smoked. She has never used smokeless tobacco.  ETOH:   reports current alcohol use. Family History:   Family History   Problem Relation Age of Onset    Colon Cancer Mother     Diabetes Father     Diabetes Maternal Aunt     Diabetes Maternal Grandmother     Heart Disease Paternal Grandmother     Diabetes Maternal Aunt     Diabetes Maternal Aunt     Diabetes Maternal Aunt     Diabetes Maternal Aunt     Diabetes Maternal Aunt      A:    See S: above     Diagnosis:    Panic disorder; UJAN M, MDD, recurrent; R/O PTSD? Diagnosis Date    ADHD     Anxiety     Kidney stone     MDD (major depressive disorder)     has been on zoloft, paxil, celexa.   (had severe HAs, and nausea on wellbutrin)    Seasonal allergies      Problems with primary support group, Problems related to the social environment and Occupational problems    Safety: denied any si/hi risk, intent, or plan     Plan:  Pt interventions:    Practiced assertive communication, Trained in strategies for increasing balanced thinking, Discussed self-care (sleep, nutrition, rewarding activities, social support, exercise), Discussed benefits of referral for specialty care, Provided education on PTSD symptoms and treatment options for evidence-based treatment (Cognitive Processing Therapy and Prolonged Exposure), Established rapport, Conducted functional assessment and Supportive techniques    Pt Behavioral Change Plan:    1. Dr Randal Hickey will reach out to colleagues for \"full dose\" individual psychotherapy referrals  2. Continue to slow down any amount, constructive rest  3.  Return to clinic for Dr Randal Hickey in 2 weeks

## 2021-02-02 RX ORDER — CITALOPRAM 40 MG/1
40 TABLET ORAL DAILY
Qty: 30 TABLET | Refills: 0 | Status: SHIPPED | OUTPATIENT
Start: 2021-02-02 | End: 2021-03-12 | Stop reason: SDUPTHER

## 2021-02-08 NOTE — PATIENT INSTRUCTIONS
1. Dr Manan Iniguez will reach out to colleagues for \"full dose\" individual psychotherapy referrals  2. Continue to slow down any amount, constructive rest  3.  Return to clinic for Dr Manan Iniguez in 2 weeks

## 2021-02-15 ENCOUNTER — TELEPHONE (OUTPATIENT)
Dept: PSYCHOLOGY | Age: 46
End: 2021-02-15

## 2021-02-15 NOTE — TELEPHONE ENCOUNTER
Telephone contact: 3 minutes    Called pt with psychotherapy referrals. No answer. Left message on VMD[facility] [place], [facility]  Phone: [phone]  Email: [email or web address]  Fax: [phone]    Quin  [address] [place], [place] [address], [address]   [address] [place] [address],  Alyssa Stout   [phone]   Charleen   [address] [place] [address] Alyssa Gurabo     Call Marita Torres  [phone]     Pt is welcome to call me back with any questions about referrals above.      CLW

## 2021-03-12 RX ORDER — CITALOPRAM 40 MG/1
40 TABLET ORAL DAILY
Qty: 30 TABLET | Refills: 0 | Status: SHIPPED | OUTPATIENT
Start: 2021-03-12 | End: 2021-04-15 | Stop reason: SDUPTHER

## 2021-04-15 RX ORDER — CITALOPRAM 40 MG/1
40 TABLET ORAL DAILY
Qty: 30 TABLET | Refills: 0 | Status: SHIPPED | OUTPATIENT
Start: 2021-04-15 | End: 2021-06-22 | Stop reason: SDUPTHER

## 2021-05-10 ENCOUNTER — OFFICE VISIT (OUTPATIENT)
Dept: PRIMARY CARE CLINIC | Age: 46
End: 2021-05-10
Payer: COMMERCIAL

## 2021-05-10 VITALS
BODY MASS INDEX: 33.2 KG/M2 | SYSTOLIC BLOOD PRESSURE: 132 MMHG | HEIGHT: 66 IN | WEIGHT: 206.6 LBS | TEMPERATURE: 97 F | HEART RATE: 100 BPM | DIASTOLIC BLOOD PRESSURE: 80 MMHG | OXYGEN SATURATION: 98 %

## 2021-05-10 DIAGNOSIS — J45.21 MILD INTERMITTENT ASTHMA WITH ACUTE EXACERBATION: Primary | ICD-10-CM

## 2021-05-10 DIAGNOSIS — J30.2 SEASONAL ALLERGIES: ICD-10-CM

## 2021-05-10 PROCEDURE — 99214 OFFICE O/P EST MOD 30 MIN: CPT | Performed by: FAMILY MEDICINE

## 2021-05-10 RX ORDER — PREDNISONE 20 MG/1
40 TABLET ORAL DAILY
Qty: 10 TABLET | Refills: 0 | Status: SHIPPED | OUTPATIENT
Start: 2021-05-10 | End: 2021-05-15

## 2021-05-10 RX ORDER — ALBUTEROL SULFATE 90 UG/1
2 AEROSOL, METERED RESPIRATORY (INHALATION) 4 TIMES DAILY PRN
Qty: 1 INHALER | Refills: 2 | Status: SHIPPED | OUTPATIENT
Start: 2021-05-10 | End: 2022-10-04 | Stop reason: SDUPTHER

## 2021-05-10 NOTE — PROGRESS NOTES
PROGRESS NOTE     Mary Lou Leon MD  [address] [place] [address]. [address], [address] [facility] [place] [address]         Phone: [phone]    Date of Service:  5/10/2021     Patient ID: Dl Jeff is a 39 y.o. female      Assessment / Plan:     1. Mild intermittent asthma with acute exacerbation  Essentially normal exam today, but based on history and relief of symptoms with albuterol, will diagnose mild asthma exacerbation. Will treat with prednisone burst and albuterol as needed. Sent spacer and with albuterol inhaler as well, and taught proper way to use. 2. Seasonal allergies  Continue Xyzal and Singulair, as symptoms currently under better control. Subjective:     CC:  \"my asthma is acting up and my allergies aren't controlled    HPI    68-year-old white female with seasonal allergies and allergy induced asthma, presenting with a week of intermittent sinus pressure, nasal congestion, sneezing, itchy eyes and nose, postnasal drip with cough. She has been compliant with Xyzal and Singulair. She comes in today because she is concerned about symptoms she had last night. She started coughing a lot, and states everyone could hear her audibly wheezing. As she was out of her own albuterol, she went and got her 's. She took 3 puffs of albuterol and this seemed to stop the coughing and her symptoms of chest tightness and shortness of breath.       ROS:    No chest pain, no productivity with her cough, current sinus pain or pressure, fever, loss of appetite, headache, neck pain, current shortness of breath, dyspnea on exertion, orthopnea, paroxysmal nocturnal dyspnea, palpitations    Vitals:    05/10/21 1445   BP: 132/80   Pulse: 100   Temp: 97 °F (36.1 °C)   TempSrc: Temporal   SpO2: 98%   Weight: 206 lb 9.6 oz (93.7 kg)   Height: 5' 6\" (1.676 m)       Outpatient Medications Marked as Taking for the

## 2021-06-25 ENCOUNTER — TELEPHONE (OUTPATIENT)
Dept: FAMILY MEDICINE CLINIC | Age: 46
End: 2021-06-25

## 2021-06-28 ENCOUNTER — OFFICE VISIT (OUTPATIENT)
Dept: PRIMARY CARE CLINIC | Age: 46
End: 2021-06-28
Payer: COMMERCIAL

## 2021-06-28 VITALS
HEART RATE: 93 BPM | HEIGHT: 66 IN | SYSTOLIC BLOOD PRESSURE: 124 MMHG | TEMPERATURE: 98.1 F | OXYGEN SATURATION: 99 % | BODY MASS INDEX: 32.62 KG/M2 | WEIGHT: 203 LBS | DIASTOLIC BLOOD PRESSURE: 82 MMHG

## 2021-06-28 DIAGNOSIS — B02.9 HERPES ZOSTER WITHOUT COMPLICATION: Primary | ICD-10-CM

## 2021-06-28 PROCEDURE — 99213 OFFICE O/P EST LOW 20 MIN: CPT | Performed by: FAMILY MEDICINE

## 2021-06-28 RX ORDER — GABAPENTIN 300 MG/1
300 CAPSULE ORAL 3 TIMES DAILY
Qty: 90 CAPSULE | Refills: 0 | Status: SHIPPED | OUTPATIENT
Start: 2021-06-28 | End: 2021-09-30

## 2021-06-28 RX ORDER — TRIAMCINOLONE ACETONIDE 1 MG/G
CREAM TOPICAL
COMMUNITY
Start: 2021-06-25

## 2021-06-28 NOTE — PROGRESS NOTES
PROGRESS NOTE     Shivam Lott MD  [address] [place] [address]. [address], [address] [facility] [address]         Phone: [phone]    Date of Service:  6/28/2021     Patient ID: Dl Bolaños is a 55 y.o. female      Assessment / Plan:     1. Herpes zoster without complication  Too late to start antivirals. Can treat pain with the following:  - gabapentin (NEURONTIN) 300 MG capsule; Take 1 capsule by mouth 3 times daily for 30 days. Intended supply: 90 days  Dispense: 90 capsule; Refill: 0      Reviewed side effects and expected course. Discussed can titrate medication over time if initial dose not effective. Asked patient not to drive the first day she takes it, to ensure it does not make her feel sedated. Subjective:     CC: Painful rash    HPI  54 yo WF presenting with \"stinging\" sensation  6 days ago. It hurt to pull clothes up on upper hip. She then noticed small red bumps. She went to an urgent care and was treated with steroid cream.  She states the rash now has scabbed. She is having sharp pain radiating for the left hip to her left groin. She denies any drainage, surrounding erythema, fever. No rash elsewhere on body. ROS:    See hpi    Vitals:    06/28/21 1325   BP: 124/82   Pulse: 93   Temp: 98.1 °F (36.7 °C)   TempSrc: Temporal   SpO2: 99%   Weight: 203 lb (92.1 kg)   Height: 5' 6\" (1.676 m)       Outpatient Medications Marked as Taking for the 6/28/21 encounter (Office Visit) with Cande Moore MD   Medication Sig Dispense Refill    triamcinolone (KENALOG) 0.1 % cream APPLY TOPICALLY TO THE AFFECTED AREA THREE TIMES DAILY FOR 7 DAYS      gabapentin (NEURONTIN) 300 MG capsule Take 1 capsule by mouth 3 times daily for 30 days.  Intended supply: 90 days 90 capsule 0    citalopram (CELEXA) 40 MG tablet Take 1 tablet by mouth daily 90 tablet 0    albuterol sulfate HFA (VENTOLIN HFA) 108 (90 Base) MCG/ACT inhaler Inhale 2 puffs into the lungs 4 times daily as needed for Wheezing 1 Inhaler 2    Spacer/Aero-Holding Chambers ZHENG 1 Device by Does not apply route daily as needed (SOB) 1 Device 0    montelukast (SINGULAIR) 10 MG tablet TAKE 1 TABLET BY MOUTH DAILY 90 tablet 1    Cetirizine HCl (ZYRTEC PO) Take by mouth               Objective:   Constitutional:   · Reviewed vitals above  · Well Nourished, well developed, no distress       Resp:  · Normal effort  Musculoskeletal:  · Normal Gait  · All extremities without clubbing, cyanosis or edema  Skin:  ·   Physical Exam  Musculoskeletal:        Legs:        · Cluster of 4 small 1 to 2 mm scabs, located along upper left hip, area and red marked above  · No areas of increased heat or induration on palpation  Psych:  · Normal mood and affect  · Normal insight and judgement

## 2021-07-12 ENCOUNTER — NURSE TRIAGE (OUTPATIENT)
Dept: OTHER | Facility: CLINIC | Age: 46
End: 2021-07-12

## 2021-07-12 NOTE — TELEPHONE ENCOUNTER
Received call from [facility] with Red Flag Complaint. Brief description of triage: right hip and thigh pain after fall last night. Unable to bear weight and has severe pain with any movement but states she can walk to the car with assistance. Triage indicates for patient to go to ED. Advised patient to have  drive her to ED, which she agrees to. Advised patient to call 911 if she encounters any issues with safely getting into car. Care advice provided, patient verbalizes understanding; denies any other questions or concerns; instructed to call back for any new or worsening symptoms. Attention Provider: Thank you for allowing me to participate in the care of your patient. The patient was connected to triage in response to information provided to the ECC. Please do not respond through this encounter as the response is not directed to a shared pool. Reason for Disposition   SEVERE pain (e.g. excruciating)    Answer Assessment - Initial Assessment Questions  1. MECHANISM: \"How did the injury happen? \" (e.g., twisting injury, direct blow)       Nichole Beckwith last night when pulled by dog    2. ONSET: \"When did the injury happen? \" (Minutes or hours ago)       Last night    3. LOCATION: \"Where is the injury located? \"       Right lateral hip and anterior thigh    4. APPEARANCE of INJURY: \"What does the injury look like? \"  (e.g., deformity of leg)      Unsure    5. SEVERITY: \"Can you put weight on that leg? \" \"Can you walk? \"       No    6. SIZE: For cuts, bruises, or swelling, ask: \"How large is it? \" (e.g., inches or centimeters)       N/A    7. PAIN: \"Is there pain? \" If so, ask: \"How bad is the pain? \"  (Scale 1-10; or mild, moderate, severe)      Severe    8. TETANUS: For any breaks in the skin, ask: \"When was the last tetanus booster? \"      N/A    9. OTHER SYMPTOMS: \"Do you have any other symptoms? \"       Also has shingles    10. PREGNANCY: \"Is there any chance you are pregnant? \" \"When was your last menstrual period? \"        No    Protocols used: LEG INJURY-ADULT-[place]

## 2021-09-19 RX ORDER — CITALOPRAM 40 MG/1
40 TABLET ORAL DAILY
Qty: 90 TABLET | Refills: 0 | Status: SHIPPED | OUTPATIENT
Start: 2021-09-19 | End: 2022-01-25 | Stop reason: DRUGHIGH

## 2021-09-20 RX ORDER — CITALOPRAM 40 MG/1
40 TABLET ORAL DAILY
Qty: 90 TABLET | Refills: 1 | Status: SHIPPED | OUTPATIENT
Start: 2021-09-20 | End: 2021-09-30 | Stop reason: CLARIF

## 2021-09-22 ENCOUNTER — TELEPHONE (OUTPATIENT)
Dept: PRIMARY CARE CLINIC | Age: 46
End: 2021-09-22

## 2021-09-22 NOTE — TELEPHONE ENCOUNTER
----- Message from [facility] sent at 9/22/2021  4:14 PM EDT -----  Subject: Appointment Request    Reason for Call: Urgent Cough Cold    QUESTIONS  Type of Appointment? Established Patient  Reason for appointment request? No appointments available during search  Additional Information for Provider? Karsten believes she has a sinus   infection. She has extreme pressure in her head as well as congestion. She   has been tested for COVID twice, this week and last week, both tests came   back negative. However she did screen red due to the headaches and   congestion she is having. She is going to [facility] since there   was no availability until Tuesday, and she would like to keep you updated   until she is able to come in for an appointment.  ---------------------------------------------------------------------------  --------------  [employer]  What is the best way for the office to contact you? Do not leave any   message, patient will call back for answer  Preferred Call Back Phone Number? [phone]  ---------------------------------------------------------------------------  --------------  SCRIPT ANSWERS  Relationship to Patient? Self  Are you currently unable to finish sentences due to any difficulty   breathing? No  Are you unable to swallow liquids? No  Are you having fevers (100.4 or greater), chills, or sweats? No  Do you have COPD, asthma or a chronic lung condition? Yes   Have you been diagnosed with, awaiting test results for, or told that you   are suspected of having COVID-19 (Coronavirus)? (If patient has tested   negative or was tested as a requirement for work, school, or travel and   not based on symptoms, answer no)? No  Within the past two weeks have you developed any of the following symptoms   (answer no if symptoms have been present longer than 2 weeks or began   more than 2 weeks ago)?  Fever or Chills, Cough, Shortness of breath or   difficulty breathing, Loss of taste or smell, Sore throat, Nasal   congestion, Sneezing or runny nose, Fatigue or generalized body aches   (answer no if pain is specific to a body part e.g. back pain), Diarrhea,   Headache?  Yes

## 2021-09-30 ENCOUNTER — OFFICE VISIT (OUTPATIENT)
Dept: PRIMARY CARE CLINIC | Age: 46
End: 2021-09-30
Payer: COMMERCIAL

## 2021-09-30 VITALS
TEMPERATURE: 97.4 F | WEIGHT: 197 LBS | SYSTOLIC BLOOD PRESSURE: 122 MMHG | BODY MASS INDEX: 31.66 KG/M2 | OXYGEN SATURATION: 97 % | DIASTOLIC BLOOD PRESSURE: 78 MMHG | HEIGHT: 66 IN | HEART RATE: 91 BPM

## 2021-09-30 DIAGNOSIS — Z01.818 PRE-OP EVALUATION: Primary | ICD-10-CM

## 2021-09-30 DIAGNOSIS — J30.2 SEASONAL ALLERGIES: ICD-10-CM

## 2021-09-30 DIAGNOSIS — F41.9 ANXIETY: ICD-10-CM

## 2021-09-30 PROCEDURE — 90756 CCIIV4 VACC ABX FREE IM: CPT | Performed by: FAMILY MEDICINE

## 2021-09-30 PROCEDURE — 99243 OFF/OP CNSLTJ NEW/EST LOW 30: CPT | Performed by: FAMILY MEDICINE

## 2021-09-30 PROCEDURE — 90471 IMMUNIZATION ADMIN: CPT | Performed by: FAMILY MEDICINE

## 2021-09-30 RX ORDER — CITALOPRAM 40 MG/1
40 TABLET ORAL DAILY
Qty: 90 TABLET | Refills: 1 | Status: SHIPPED | OUTPATIENT
Start: 2021-09-30 | End: 2021-12-27 | Stop reason: SDUPTHER

## 2021-09-30 NOTE — LETTER
Preoperative Consultation        [address] [facility] [place] [address]         Phone: Dylan  YOB: 1975    Date of Service:  9/30/2021    Vitals:    09/30/21 1356   BP: 122/78   Pulse: 91   Temp: 97.4 °F (36.3 °C)   TempSrc: Temporal   SpO2: 97%   Weight: 197 lb (89.4 kg)   Height: 5' 6\" (1.676 m)      Wt Readings from Last 2 Encounters:   09/30/21 197 lb (89.4 kg)   06/28/21 203 lb (92.1 kg)     BP Readings from Last 3 Encounters:   09/30/21 122/78   06/28/21 124/82   05/10/21 132/80        Chief Complaint   Patient presents with    Pre-op Exam     right hip surgery 10/8 Dr Tanesha ACKERAMN     Allergies   Allergen Reactions    Trazodone Nausea Only and Shortness Of Breath    Pcn [Penicillins] Rash    Cephalosporins Hives    Gluten Meal Diarrhea    Wellbutrin [Bupropion] Other (See Comments)     Outpatient Medications Marked as Taking for the 9/30/21 encounter (Office Visit) with Mitchel Harper MD   Medication Sig Dispense Refill    cyanocobalamin ([employer] VITAMIN B12) 1000 MCG tablet Take 1 tablet by mouth daily      citalopram (CELEXA) 40 MG tablet Take 1 tablet by mouth daily 90 tablet 1    citalopram (CELEXA) 40 MG tablet TAKE 1 TABLET BY MOUTH DAILY 90 tablet 0    triamcinolone (KENALOG) 0.1 % cream APPLY TOPICALLY TO THE AFFECTED AREA THREE TIMES DAILY FOR 7 DAYS      albuterol sulfate HFA (VENTOLIN HFA) 108 (90 Base) MCG/ACT inhaler Inhale 2 puffs into the lungs 4 times daily as needed for Wheezing 1 Inhaler 2    Spacer/Aero-Holding Chambers ZHENG 1 Device by Does not apply route daily as needed (SOB) 1 Device 0    montelukast (SINGULAIR) 10 MG tablet TAKE 1 TABLET BY MOUTH DAILY 90 tablet 1    Cetirizine HCl (ZYRTEC PO) Take by mouth      diphenhydrAMINE (BENADRYL) 25 MG capsule Take 25 mg by mouth every 6 hours as needed for Itching          This patient presents to the office today for a preoperative consultation at the request of surgeon, Dr. Wen Ríos, who plans on performing right total hip replacement on October 8 at [facility] location. Planned anesthesia: General   Known anesthesia problems: None   Bleeding risk: No recent or remote history of abnormal bleeding  Personal or FH of DVT/PE: No    Patient objection to receiving blood products: No    Patient Active Problem List   Diagnosis    Seasonal allergies    MDD (major depressive disorder)    Anxiety    Kidney stone    Hx of adenomatous polyp of colon    Excessive daytime sleepiness    ADHD       Past Medical History:   Diagnosis Date    ADHD     Anxiety     Kidney stone     MDD (major depressive disorder)     has been on zoloft, paxil, celexa. (had severe HAs, and nausea on wellbutrin)    Seasonal allergies      Past Surgical History:   Procedure Laterality Date    ABDOMINAL EXPLORATION SURGERY      BREAST ENHANCEMENT SURGERY      TUBAL LIGATION       Family History   Problem Relation Age of Onset    Colon Cancer Mother     Diabetes Father     Diabetes Maternal Aunt     Diabetes Maternal Grandmother     Heart Disease Paternal Grandmother     Diabetes Maternal Aunt     Diabetes Maternal Aunt     Diabetes Maternal Aunt     Diabetes Maternal Aunt     Diabetes Maternal Aunt        Social History     Socioeconomic History    Marital status:    Occupational History    Not on file   Tobacco Use    Smoking status: Never Smoker    Smokeless tobacco: Never Used   Substance and Sexual Activity    Alcohol use:  Yes     Alcohol/week: 0.0 standard drinks     Comment: socially (a couple glasses on the week)    Drug use: No    Sexual activity: Yes     Partners: Male     Comment: , 2 children   Other Topics Concern    Not on file   Social History Narrative    Caffeine:        SODA - 0          TEA - 0 COFFEE - 1 cup in the morning         Review of Systems  Constitutional:  Negative for activity or appetite change, fever or fatigue  HENT:  Negative for congestion, sinus pressure, or rhinorrhea  Eyes:  Negative for eye pain or visual changes  Resp:  Negative for SOB, chest tightness, cough  Cardiovascular: Negative for CP, palpitations, RICK, orthopnea, PND, LE edema  Gastrointestinal: Negative for abd pain, melena, BRBPR, N/V/D  Endocrine:  Negative for polydipsia and polyuria  :  Negative for dysuria, flank pain or urinary frequency  Musculoskeletal:  Negative for back pain   Neuro:  Negative for dizziness or lightheadedness  Psych: negative for depression or anxiety       Physical Exam   Constitutional: She is oriented to person, place, and time. She appears well-developed and well-nourished. No distress. HENT:   Head: Normocephalic and atraumatic. Mouth/Throat: Uvula is midline, oropharynx is clear and moist and mucous membranes are normal.   Eyes: Conjunctivae and EOM are normal. Pupils are equal, round, and reactive to light. Neck: Trachea normal and normal range of motion. Neck supple. No JVD present. Carotid bruit is not present. No mass and no thyromegaly present. Cardiovascular: Normal rate, regular rhythm, normal heart sounds and intact distal pulses. Exam reveals no gallop and no friction rub. No murmur heard. Pulmonary/Chest: Effort normal and breath sounds normal. No respiratory distress. She has no wheezes. She has no rales. Abdominal: Soft. Normal aorta and bowel sounds are normal. She exhibits no distension and no mass. There is no hepatosplenomegaly. No tenderness. Musculoskeletal: She exhibits no edema and no tenderness. Neurological: She is alert and oriented to person, place, and time. She has normal strength. No cranial nerve deficit or sensory deficit. Coordination and gait normal.   Skin: Skin is warm and dry. No rash noted. No erythema.    Psychiatric: She has a normal mood and affect. Her behavior is normal.       Lab Review  Patient had pre-op blood work ordered by surgeon at [facility] lab today. --normal coags, protein/albumin, WBCs, hemoglobin, LFTs, CBC with diff, BMP, O positive blood type     Assessment:       55 y.o. patient with planned surgery as above. Known risk factors for perioperative complications:   · Asthma: allergy induced, rarely needs albuterol. Controlled with Singulair. Current medications which may produce withdrawal symptoms if withheld perioperatively: none      Plan       1. Pre-op evaluation  coags, protein/albumin, WBCs, hemoglobin, LFTs, CBC with diff, BMP all ordered by surgeon. Reviewed and WNLs     Seasonal allergies induced asthma  Controlled on singulair. Rarely needs albuterol      RCRI risk score =  0 risk factors    Functional Capacity:  Determination not indicated as patient is low risk of MACE based on combined clinical and surgical risks. 2. Change current medications as follows: ----None    3. Prophylaxis for cardiac events with   A)  perioperative beta-blockers: Not indicated  B)  perioperative statins: not indicated    4. Deep vein thrombosis prophylaxis: regimen to be chosen by surgical team    5. As outlined above, measures were taken to assess risk, and decrease risk when possible. Risks of surgery were discussed with patient, and benefits believed to outweigh risks. Patient is making an informed decision to proceed with surgery with an understanding of any risk,  Please follow all pre-op recommendations above.         Angela Soto MD

## 2021-09-30 NOTE — PROGRESS NOTES
Preoperative Consultation        [address] [facility] [address] [facility]                                  [address] [place] [address]. [address] [facility] [place] [address]         Phone: Dylan  YOB: 1975    Date of Service:  9/30/2021    Vitals:    09/30/21 1356   BP: 122/78   Pulse: 91   Temp: 97.4 °F (36.3 °C)   TempSrc: Temporal   SpO2: 97%   Weight: 197 lb (89.4 kg)   Height: 5' 6\" (1.676 m)      Wt Readings from Last 2 Encounters:   09/30/21 197 lb (89.4 kg)   06/28/21 203 lb (92.1 kg)     BP Readings from Last 3 Encounters:   09/30/21 122/78   06/28/21 124/82   05/10/21 132/80        Chief Complaint   Patient presents with    Pre-op Exam     right hip surgery 10/8 Dr Hayder ACKERMAN     Allergies   Allergen Reactions    Trazodone Nausea Only and Shortness Of Breath    Pcn [Penicillins] Rash    Cephalosporins Hives    Gluten Meal Diarrhea    Wellbutrin [Bupropion] Other (See Comments)     Outpatient Medications Marked as Taking for the 9/30/21 encounter (Office Visit) with Stefani Diehl MD   Medication Sig Dispense Refill    cyanocobalamin ([employer] VITAMIN B12) 1000 MCG tablet Take 1 tablet by mouth daily      citalopram (CELEXA) 40 MG tablet Take 1 tablet by mouth daily 90 tablet 1    citalopram (CELEXA) 40 MG tablet TAKE 1 TABLET BY MOUTH DAILY 90 tablet 0    triamcinolone (KENALOG) 0.1 % cream APPLY TOPICALLY TO THE AFFECTED AREA THREE TIMES DAILY FOR 7 DAYS      albuterol sulfate HFA (VENTOLIN HFA) 108 (90 Base) MCG/ACT inhaler Inhale 2 puffs into the lungs 4 times daily as needed for Wheezing 1 Inhaler 2    Spacer/Aero-Holding Chambers ZHENG 1 Device by Does not apply route daily as needed (SOB) 1 Device 0    montelukast (SINGULAIR) 10 MG tablet TAKE 1 TABLET BY MOUTH DAILY 90 tablet 1    Cetirizine HCl (ZYRTEC PO) Take by mouth      diphenhydrAMINE (BENADRYL) 25 MG capsule Take 25 mg by mouth every 6 hours as needed for Itching          This patient presents to the office today for a preoperative consultation at the request of surgeon, Dr. Ari Hancock, who plans on performing right total hip replacement on October 8 at [facility] location. Planned anesthesia: General   Known anesthesia problems: None   Bleeding risk: No recent or remote history of abnormal bleeding  Personal or FH of DVT/PE: No    Patient objection to receiving blood products: No    Patient Active Problem List   Diagnosis    Seasonal allergies    MDD (major depressive disorder)    Anxiety    Kidney stone    Hx of adenomatous polyp of colon    Excessive daytime sleepiness    ADHD       Past Medical History:   Diagnosis Date    ADHD     Anxiety     Kidney stone     MDD (major depressive disorder)     has been on zoloft, paxil, celexa. (had severe HAs, and nausea on wellbutrin)    Seasonal allergies      Past Surgical History:   Procedure Laterality Date    ABDOMINAL EXPLORATION SURGERY      BREAST ENHANCEMENT SURGERY      TUBAL LIGATION       Family History   Problem Relation Age of Onset    Colon Cancer Mother     Diabetes Father     Diabetes Maternal Aunt     Diabetes Maternal Grandmother     Heart Disease Paternal Grandmother     Diabetes Maternal Aunt     Diabetes Maternal Aunt     Diabetes Maternal Aunt     Diabetes Maternal Aunt     Diabetes Maternal Aunt        Social History     Socioeconomic History    Marital status:    Occupational History    Not on file   Tobacco Use    Smoking status: Never Smoker    Smokeless tobacco: Never Used   Substance and Sexual Activity    Alcohol use:  Yes     Alcohol/week: 0.0 standard drinks     Comment: socially (a couple glasses on the week)    Drug use: No    Sexual activity: Yes     Partners: Male     Comment: , 2 children   Other Topics Concern    Not on file   Social History Narrative    Caffeine:        SODA - 0          TEA - 0        COFFEE - 1 cup in the morning         Review of Systems  Constitutional:  Negative for activity or appetite change, fever or fatigue  HENT:  Negative for congestion, sinus pressure, or rhinorrhea  Eyes:  Negative for eye pain or visual changes  Resp:  Negative for SOB, chest tightness, cough  Cardiovascular: Negative for CP, palpitations, RICK, orthopnea, PND, LE edema  Gastrointestinal: Negative for abd pain, melena, BRBPR, N/V/D  Endocrine:  Negative for polydipsia and polyuria  :  Negative for dysuria, flank pain or urinary frequency  Musculoskeletal:  Negative for back pain   Neuro:  Negative for dizziness or lightheadedness  Psych: negative for depression or anxiety       Physical Exam   Constitutional: She is oriented to person, place, and time. She appears well-developed and well-nourished. No distress. HENT:   Head: Normocephalic and atraumatic. Mouth/Throat: Uvula is midline, oropharynx is clear and moist and mucous membranes are normal.   Eyes: Conjunctivae and EOM are normal. Pupils are equal, round, and reactive to light. Neck: Trachea normal and normal range of motion. Neck supple. No JVD present. Carotid bruit is not present. No mass and no thyromegaly present. Cardiovascular: Normal rate, regular rhythm, normal heart sounds and intact distal pulses. Exam reveals no gallop and no friction rub. No murmur heard. Pulmonary/Chest: Effort normal and breath sounds normal. No respiratory distress. She has no wheezes. She has no rales. Abdominal: Soft. Normal aorta and bowel sounds are normal. She exhibits no distension and no mass. There is no hepatosplenomegaly. No tenderness. Musculoskeletal: She exhibits no edema and no tenderness. Neurological: She is alert and oriented to person, place, and time. She has normal strength. No cranial nerve deficit or sensory deficit. Coordination and gait normal.   Skin: Skin is warm and dry. No rash noted. No erythema.    Psychiatric: She has a normal mood and

## 2021-11-08 ENCOUNTER — OFFICE VISIT (OUTPATIENT)
Dept: PRIMARY CARE CLINIC | Age: 46
End: 2021-11-08
Payer: COMMERCIAL

## 2021-11-08 VITALS
TEMPERATURE: 98.1 F | SYSTOLIC BLOOD PRESSURE: 108 MMHG | DIASTOLIC BLOOD PRESSURE: 78 MMHG | HEART RATE: 94 BPM | BODY MASS INDEX: 32.78 KG/M2 | WEIGHT: 204 LBS | OXYGEN SATURATION: 97 % | HEIGHT: 66 IN

## 2021-11-08 DIAGNOSIS — F33.1 MODERATE EPISODE OF RECURRENT MAJOR DEPRESSIVE DISORDER (HCC): ICD-10-CM

## 2021-11-08 DIAGNOSIS — F41.9 ANXIETY: ICD-10-CM

## 2021-11-08 DIAGNOSIS — F90.2 ATTENTION DEFICIT HYPERACTIVITY DISORDER (ADHD), COMBINED TYPE: Primary | ICD-10-CM

## 2021-11-08 PROBLEM — F50.9 EATING DISORDER: Status: ACTIVE | Noted: 2021-11-08

## 2021-11-08 PROBLEM — B00.2 HERPETIC GINGIVOSTOMATITIS: Status: ACTIVE | Noted: 2021-11-08

## 2021-11-08 PROBLEM — M25.551 RIGHT HIP PAIN: Status: ACTIVE | Noted: 2021-09-27

## 2021-11-08 PROBLEM — K59.09 CHRONIC CONSTIPATION: Status: ACTIVE | Noted: 2017-02-13

## 2021-11-08 PROBLEM — Z00.6 RESEARCH STUDY PATIENT: Status: ACTIVE | Noted: 2017-06-08

## 2021-11-08 PROBLEM — S73.191A TEAR OF RIGHT ACETABULAR LABRUM: Status: ACTIVE | Noted: 2021-07-28

## 2021-11-08 PROBLEM — N92.0 EXCESSIVE AND FREQUENT MENSTRUATION: Status: ACTIVE | Noted: 2017-05-23

## 2021-11-08 PROBLEM — R10.2 CHRONIC FEMALE PELVIC PAIN: Status: ACTIVE | Noted: 2017-05-25

## 2021-11-08 PROBLEM — H90.41 SENSORINEURAL HEARING LOSS (SNHL) OF RIGHT EAR WITH UNRESTRICTED HEARING OF LEFT EAR: Status: ACTIVE | Noted: 2017-12-11

## 2021-11-08 PROBLEM — G89.29 CHRONIC FEMALE PELVIC PAIN: Status: ACTIVE | Noted: 2017-05-25

## 2021-11-08 PROBLEM — M16.11 PRIMARY OSTEOARTHRITIS OF RIGHT HIP: Status: ACTIVE | Noted: 2021-09-08

## 2021-11-08 PROCEDURE — 99214 OFFICE O/P EST MOD 30 MIN: CPT

## 2021-11-08 PROCEDURE — 93000 ELECTROCARDIOGRAM COMPLETE: CPT

## 2021-11-08 SDOH — ECONOMIC STABILITY: FOOD INSECURITY: WITHIN THE PAST 12 MONTHS, YOU WORRIED THAT YOUR FOOD WOULD RUN OUT BEFORE YOU GOT MONEY TO BUY MORE.: NEVER TRUE

## 2021-11-08 SDOH — ECONOMIC STABILITY: FOOD INSECURITY: WITHIN THE PAST 12 MONTHS, THE FOOD YOU BOUGHT JUST DIDN'T LAST AND YOU DIDN'T HAVE MONEY TO GET MORE.: NEVER TRUE

## 2021-11-08 ASSESSMENT — ENCOUNTER SYMPTOMS
EYES NEGATIVE: 1
RESPIRATORY NEGATIVE: 1
GASTROINTESTINAL NEGATIVE: 1

## 2021-11-08 ASSESSMENT — SOCIAL DETERMINANTS OF HEALTH (SDOH): HOW HARD IS IT FOR YOU TO PAY FOR THE VERY BASICS LIKE FOOD, HOUSING, MEDICAL CARE, AND HEATING?: NOT HARD AT ALL

## 2021-11-08 NOTE — PROGRESS NOTES
[address] and [facility]                                             [address] [facility], [facility], [address] [place] [id number]        Phone: [phone]      Name:  Francoise Stiles  :    1975    Consultants:   Patient Care Team:  Braxton Marie MD as PCP - General (Family Medicine)  Nasreen Jimenez as PCP - OBGYCHATO Marie MD as PCP - [facility]  Nasreen Jimenez    Chief Complaint:     Francoise Stiles is a 55 y.o. female  who presents today for an established patient care visit with Personalized Prevention Plan Services as noted below. HPI:     Merline Ferraris is a 56 yo female w/ hx of ADHD, anxiety, depression, seasonal allergies, and kidney stone who presents today to discuss ADHD and anxiety.      ADHD  - adderall 5 mg BID started 2018, upped to 7.5 mg BID 18  - not sure when she stopped but has been off for over a year and regrets stopping  - can tell a difference when she takes it as far as focus and eliminating racing thoughts, constant distraction   - at work can't prioritize tasks, keep track of emails, remember when meetings are; she is worried about getting fired   - at home can't remember where she puts lists, restless, can't just sit and just relax or watch tv, gets irritable if not doing things   - doesn't mind restlessness as much but the inability to focus makes her depressed     Anxiety/depression   - PHQ-9 27 today, JUAN M-7 9 today  - takes citalopram 40 mg nightly; this has helped control panic attacks but does not control anxiety and depression  - citalopram does help her get to sleep but she reports recent trouble staying asleep, wakes up with mind racing and can't shut brain off to go back to sleep, has used focus/meditation saud which helps some   - has previously enjoyed exercising/hiking/yoga as part of her wellness but fell in July and is getting hip replaced 11/10/21; hasn't been able to be very physically active recently but looks forward to resuming that after surgery   - feels like she can't focus at work which leads into a lot of depression and anxiety about her job, life, purpose, and worth   - on 11/5 she had a complete meltdown in boss's office sobbing, got sent home and hasn't been back to work since  - feels worthless which has led to passive thoughts that she would be better off dead, no active suicidal ideation, no plan or intention, no thoughts of hurting herself, no homicidal ideation   - has previously done counseling/therapy on and off since early 25s but not in anything right now  - she reports she has previously tried wellbutrin (heart palpitations), sertraline (zombie, depression worse), fluoxetine (not big difference)      Patient Active Problem List   Diagnosis    Seasonal allergies    MDD (major depressive disorder)    Anxiety    Kidney stone    Hx of adenomatous polyp of colon    Excessive daytime sleepiness    ADHD         Past Medical History:    Past Medical History:   Diagnosis Date    ADHD     Anxiety     Kidney stone     MDD (major depressive disorder)     has been on zoloft, paxil, celexa. (had severe HAs, and nausea on wellbutrin)    Seasonal allergies        Past Surgical History:  Past Surgical History:   Procedure Laterality Date    ABDOMINAL EXPLORATION SURGERY      BREAST ENHANCEMENT SURGERY      TUBAL LIGATION         Home Meds:  Prior to Visit Medications    Medication Sig Taking? Authorizing Provider   cyanocobalamin ([employer] VITAMIN B12) 1000 MCG tablet Take 1 tablet by mouth daily Yes Historical Provider, MD   citalopram (CELEXA) 40 MG tablet TAKE 1 TABLET BY MOUTH DAILY Yes Vicente George., DO   triamcinolone (KENALOG) 0.1 % cream APPLY TOPICALLY TO THE AFFECTED AREA THREE TIMES DAILY FOR 7 DAYS Yes Historical Provider, MD   albuterol sulfate HFA (VENTOLIN HFA) 108 (90 Base) MCG/ACT inhaler Inhale 2 puffs into the lungs 4 times daily as needed for Wheezing Yes Ilene Valencia MD   Spacer/Aero-Holding Chambers ZHENG 1 Device by Does not apply route daily as needed (SOB) Yes Jazmyn Lee MD   montelukast (SINGULAIR) 10 MG tablet TAKE 1 TABLET BY MOUTH DAILY Yes JUANITA Escobar Caro, CNP   Cetirizine HCl (ZYRTEC PO) Take by mouth Yes Historical Provider, MD   diphenhydrAMINE (BENADRYL) 25 MG capsule Take 25 mg by mouth every 6 hours as needed for Itching  Yes Historical Provider, MD   citalopram (CELEXA) 40 MG tablet Take 1 tablet by mouth daily  Ilene Valencia MD   clonazePAM (KLONOPIN) 0.5 MG tablet Take 1 tablet by mouth 2 times daily for 30 days.   Ilene aVlencia MD       Allergies:    Trazodone, Pcn [penicillins], Cephalosporins, Gluten meal, and Wellbutrin [bupropion]    Family History:       Problem Relation Age of Onset    Colon Cancer Mother     Diabetes Father     Diabetes Maternal Aunt     Diabetes Maternal Grandmother     Heart Disease Paternal Grandmother     Diabetes Maternal Aunt     Diabetes Maternal Aunt     Diabetes Maternal Aunt     Diabetes Maternal Aunt     Diabetes Maternal Aunt          Health Maintenance Completed:  Health Maintenance   Topic Date Due    Hepatitis C screen  Never done    COVID-19 Vaccine (1) Never done    HIV screen  Never done    Cervical cancer screen  02/13/2020    Lipid screen  09/10/2023    Colon cancer screen colonoscopy  11/08/2024    DTaP/Tdap/Td vaccine (3 - Td or Tdap) 08/31/2025    Flu vaccine  Completed    Hepatitis A vaccine  Aged Out    Hepatitis B vaccine  Aged Out    Hib vaccine  Aged Out    Meningococcal (ACWY) vaccine  Aged Out    Pneumococcal 0-64 years Vaccine  Aged [employer] History   Administered Date(s) Administered    Influenza Vaccine, unspecified formulation 10/10/2016    Influenza Virus Vaccine 10/17/2013    Influenza, MDCK Quadv, with preserv IM (Flucelvax 2 yrs and older) 09/30/2021    Influenza, Quadv, IM, (6 mo and older Fluzone, Flulaval, Fluarix and 3 yrs and older Afluria) 11/30/2017    Influenza, Quadv, IM, PF (6 mo and older Fluzone, Flulaval, Fluarix, and 3 yrs and older Afluria) 12/03/2018    Td, unspecified formulation 10/22/1991    Tdap (Boostrix, Adacel) 08/31/2015         Review of Systems:  Review of Systems   Constitutional: Negative. HENT: Negative. Eyes: Negative. Respiratory: Negative. Cardiovascular: Negative. Gastrointestinal: Negative. Endocrine: Negative. Genitourinary: Negative. Musculoskeletal: Positive for arthralgias. Skin: Negative. Neurological: Negative. Hematological: Negative. Psychiatric/Behavioral: Positive for decreased concentration and dysphoric mood. The patient is nervous/anxious and is hyperactive. Physical Exam:   Vitals:    11/08/21 1329   BP: 108/78   Site: Left Upper Arm   Pulse: 94   Temp: 98.1 °F (36.7 °C)   SpO2: 97%   Weight: 204 lb (92.5 kg)   Height: 5' 6\" (1.676 m)     Body mass index is 32.93 kg/m². Wt Readings from Last 3 Encounters:   11/08/21 204 lb (92.5 kg)   09/30/21 197 lb (89.4 kg)   06/28/21 203 lb (92.1 kg)       BP Readings from Last 3 Encounters:   11/08/21 108/78   09/30/21 122/78   06/28/21 124/82       Physical Exam  Constitutional:       Appearance: Normal appearance. HENT:      Head: Normocephalic and atraumatic. Eyes:      Extraocular Movements: Extraocular movements intact. Pupils: Pupils are equal, round, and reactive to light. Neck:      Comments: No thyromegaly  Cardiovascular:      Rate and Rhythm: Normal rate and regular rhythm. Pulses: Normal pulses. Heart sounds: Normal heart sounds. Pulmonary:      Effort: Pulmonary effort is normal.      Breath sounds: Normal breath sounds. Abdominal:      General: Bowel sounds are normal.      Palpations: Abdomen is soft. There is no mass. Tenderness: There is no abdominal tenderness.    Musculoskeletal: Cervical back: Neck supple. No tenderness. Right lower leg: No edema. Left lower leg: No edema. Lymphadenopathy:      Cervical: No cervical adenopathy. Skin:     General: Skin is warm and dry. Capillary Refill: Capillary refill takes less than 2 seconds. Findings: No lesion or rash. Neurological:      General: No focal deficit present. Mental Status: She is alert. Mental status is at baseline. Psychiatric:         Mood and Affect: Mood is anxious. Affect is tearful. Behavior: Behavior normal. Behavior is cooperative. Thought Content: Thought content normal.         Judgment: Judgment normal.          EKG: normal sinus rhythm, no acute ST changes or axis deviation    Lab Review:   No visits with results within 2 Month(s) from this visit.    Latest known visit with results is:   Orders Only on 08/26/2020   Component Date Value    WBC 08/26/2020 5.7     RBC 08/26/2020 4.50     Hemoglobin 08/26/2020 13.7     Hematocrit 08/26/2020 41.2     MCV 08/26/2020 91.5     MCH 08/26/2020 30.4     MCHC 08/26/2020 33.2     RDW 08/26/2020 13.2     Platelets 44/35/8688 230     MPV 08/26/2020 8.9     Neutrophils % 08/26/2020 54.0     Lymphocytes % 08/26/2020 36.6     Monocytes % 08/26/2020 7.2     Eosinophils % 08/26/2020 1.5     Basophils % 08/26/2020 0.7     Neutrophils Absolute 08/26/2020 3.1     Lymphocytes Absolute 08/26/2020 2.1     Monocytes Absolute 08/26/2020 0.4     Eosinophils Absolute 08/26/2020 0.1     Basophils Absolute 08/26/2020 0.0     Sodium 08/26/2020 139     Potassium 08/26/2020 4.4     Chloride 08/26/2020 103     CO2 08/26/2020 23     Anion Gap 08/26/2020 13     Glucose 08/26/2020 94     BUN 08/26/2020 11     CREATININE 08/26/2020 0.8     GFR Non- 08/26/2020 >60     GFR  08/26/2020 >60     Calcium 08/26/2020 10.1     Total Protein 08/26/2020 7.6     Albumin 08/26/2020 4.5     Albumin/Globulin Ratio 08/26/2020 1.5     Total Bilirubin 08/26/2020 0.3     Alkaline Phosphatase 08/26/2020 105     ALT 08/26/2020 20     AST 08/26/2020 18     Globulin 08/26/2020 3.1     Sed Rate 08/26/2020 20     CRP 08/26/2020 7.6*          Assessment/Plan:  Thaila Whittington is a 56 yo female w/ hx of ADHD, anxiety, depression, seasonal allergies, and kidney stone who presents today to discuss ADHD and anxiety. Diagnoses and all orders for this visit:    Attention deficit hyperactivity disorder (ADHD), combined type  - not well controlled   - given referral for behavioral health for formal ADHD evaluation   - will discuss restarting adderall with Dr. Ana Ruiz, pt would like to wait until after hip replacement on 11/10/21 to start medication   - EKG, urine drug screen, and contract drug agreement done today   - f/u in 1 month to reassess   -     External Referral To [address] [place] [address]; Future  -     EKG 12 Lead  -     AR ELECTROCARDIOGRAM, COMPLETE    Moderate episode of recurrent major depressive disorder (HCC) / Anxiety   - not well controlled   - continue citalopram 40 mg daily   - referral to behavioral health for further evaluation of ADHD and anxiety/depression   - will consider medication changes/augmentation based on behavioral health eval   - discussed importance of exercise in addressing mental health, after hip replacement encouraged pt to incorporate consistent physical activity into daily routine   - f/u in 1 month to reassess   -     External Referral To [address] [place] [address] Maintenance Due:  Health Maintenance Due   Topic Date Due    Hepatitis C screen  Never done    COVID-19 Vaccine (1) Never done    HIV screen  Never done    Cervical cancer screen  02/13/2020   Not discussed today, defer to next visit     Health care decision maker:  <72years old      RTC:  Return in about 4 weeks (around 12/6/2021) for ADHD/depression f/u .      EMR Dragon/transcription disclaimer:  Much of this encounter note is electronic transcription/translation of spoken language to printed texts. The electronic translation of spoken language may be erroneous, or at times, nonsensical words or phrases may be inadvertently transcribed.   Although I have reviewed the note for such errors, some may still exist.       Jossy Daniels DO 11/8/21

## 2021-11-08 NOTE — PATIENT INSTRUCTIONS
Patient Education        Learning About Attention Deficit Hyperactivity Disorder (ADHD) in Adults  What is ADHD? Attention deficit hyperactivity disorder (ADHD) is a condition in which people have a hard time paying attention. Adults with ADHD also may be more active than normal. They tend to act without thinking. ADHD may make it harder for them to focus, get organized, and finish tasks. ADHD most often starts in childhood and lasts into adulthood. Many adults don't know that they have ADHD until their children are diagnosed. Then they begin to see their own symptoms. Doctors don't know what causes ADHD. But it tends to run in families. What are the symptoms? The most common types of ADHD symptoms in adults are attention problems and hyperactivity. Attention problems  Adults with ADHD often find it hard to:  · Finish tasks that don't interest them or aren't easy. But they may become obsessed with activities that they find interesting and enjoy. · Keep relationships. · Focus their attention on conversations, reading materials, or jobs. They may change jobs a lot. · Remember things. They may misplace or lose things. · Pay attention. They are easily distracted. They find it hard to focus on one task. · Think before they act. They may make quick decisions. They may act before they think about the effect of their actions. Hyperactivity  Adults with ADHD may:  · Fidget. They may swing their legs, shift in their seats, or tap their fingers. · Move around a lot. They may feel \"revved up\" or on the go. They may not be able to slow down until they are very tired. · Find it hard to relax. They may feel restless and find it hard to do quiet things like read or watch TV. How does ADHD affect daily life? ADHD in adults may affect:  · Job performance. They may find it hard to organize their work, manage their time, and focus on one task at a time. They may forget, misplace, or lose things.  They may quit their jobs out of boredom. · Relationships. Adults with ADHD may find it hard to focus their attention on conversations. It is hard for them to \"read\" the behavior and moods of others and express their own feelings. · Temper. They may get easily frustrated. This often can make it harder for them to deal with stress. These adults may overreact and have a short, quick temper. · The ability to solve problems. Adults who have a hard time waiting for things they want may act before they think about the effect of their actions. They may take part in risky behaviors. These include unprotected sex, unsafe driving, alcohol and drug use, or unwise business ventures. How is ADHD treated? ADHD can be treated with medicines, behavior training, or counseling. Or it may be a combination of these treatments. Medicines  Stimulant medicines are most often used to treat ADHD. These may include:    · Amphetamines. (Examples are Adderall and Dexedrine).     · Methylphenidate. (Examples are Concerta, Daytrana, Focalin, Metadate, and Ritalin). Other medicines that may be used are:    · Atomoxetine. This includes Strattera, a nonstimulant medicine for ADHD.     · Antihypertensives. These include clonidine (such as Catapres) and guanfacine (such as Tenex).   · Antidepressants. These include bupropion (Wellbutrin). Behavior training  Behavior training can help adults with ADHD learn how to:    · Get organized. A daily organizer or planner can help these adults organize their daily tasks. They can write down appointments and other things they need to remember.     · Decrease distractions. They can set up their work or home environment so that there are fewer things that will distract them. They may find using headphones or a \"white noise\" machine helpful. College students can arrange a quiet living situation. They may need a single dorm room.     · Work on relationships.  Social skills training can help adults with ADHD relate to family, friends, and coworkers. Couples counseling or family therapy can also help improve relationships. Counseling  Counseling is not meant to treat inattention, hyperactivity, or impulsiveness. But it can help with some of the problems that go along with ADHD. These include not getting along well with others and having problems following rules. Where can you learn more? Go to https://chpesuzanna.healthKonnects. org and sign in to your [employer] account. Enter U553 in the [employer] box to learn more about \"Learning About Attention Deficit Hyperactivity Disorder (ADHD) in Adults. \"     If you do not have an account, please click on the \"Sign Up Now\" link. Current as of: June 16, 2021               Content Version: 13.0  © 2006-2021 [employer]wise[employer]. Care instructions adapted under license by [facility] ([facility]). If you have questions about a medical condition or this instruction, always ask your healthcare professional. [address] any warranty or liability for your use of this information.

## 2021-11-08 NOTE — LETTER
CONTROLLED SUBSTANCE MEDICATION AGREEMENT     Patient Name: Wade Merlin  Patient YOB: 1975   I understand, that controlled substance medications may be used to help better manage my symptoms and to improve my ability to function at home, work and in social settings. However, I also understand that these medications do have risks, which have been discussed with me, including possible development of physical or psychological dependence. I understand that successful treatment requires mutual trust and honesty between me and my provider. I understand and agree that following this Medication Agreement is necessary in continuing my provider-patient relationship and the success of my treatment plan. Explanation from my Provider: Benefits and Goals of Controlled Substance Medications: There are two potential goals for your treatment: (1) decreased pain and suffering (2) improved daily life functions. There are many possible treatments for your chronic condition(s). Alternatives such as physical therapy, yoga, massage, home daily exercise, meditation, relaxation techniques, injections, chiropractic manipulations, surgery, cognitive therapy, hypnosis and many medications that are not habit-forming may be used. Use of controlled substance medications may be helpful, but they are unlikely to resolve all symptoms or restore all function. Explanation from my Provider: Risks of Controlled Substance Medications:  Opioid pain medications: These medications can lead to problems such as addiction/dependence, sedation, lightheadedness/dizziness, memory issues, falls, constipation, nausea, or vomiting. They may also impair the ability to drive or operate machinery. Additionally, these medications may lower testosterone levels, leading to loss of bone strength, stamina and sex drive.   They may cause problems with breathing, sleep apnea and reduced coughing, which is especially dangerous for patients with lung disease. Overdose or dangerous interactions with alcohol and other medications may occur, leading to death. Hyperalgesia may develop, which means patients receiving opioids for the treatment of pain may become more sensitive to certain painful stimuli, and in some cases, experience pain from ordinarily non-painful stimuli. Women between the ages of 14-53 who could become pregnant should carefully weigh the risks and benefits of opioids with their physicians, as these medications increase the risk of pregnancy complications, including miscarriage,  delivery and stillbirth. It is also possible for babies to be born addicted to opioids. Opioid dependence withdrawal symptoms may include; feelings of uneasiness, increased pain, irritability, belly pain, diarrhea, sweats and goose-flesh. Benzodiazepines and non-benzodiazepine sleep medications: These medications can lead to problems such as addiction/dependence, sedation, fatigue, lightheadedness, dizziness, incoordination, falls, depression, hallucinations, and impaired judgment, memory and concentration. The ability to drive and operate machinery may also be affected. Abnormal sleep-related behaviors have been reported, including sleepwalking, driving, making telephone calls, eating, or having sex while not fully awake. These medications can suppress breathing and worsen sleep apnea, particularly when combined with alcohol or other sedating medications, potentially leading to death. Dependence withdrawal symptoms may include tremors, anxiety, hallucinations and seizures. Stimulants:  Common adverse effects include addiction/dependence, increased blood  pressure and heart rate, decreased appetite, nausea, involuntary weight loss, insomnia,                                                                                                                     Initials:_______   irritability, and headaches.   These risks may increase when these medications are combined with other stimulants, such as caffeine pills or energy drinks, certain weight loss supplements and oral decongestants. Dependence withdrawal symptoms may include depressed mood, loss of interest, suicidal thoughts, anxiety, fatigue, appetite changes and agitation. Testosterone replacement therapy:  Potential side effects include increased risk of stroke and heart attack, blood clots, increased blood pressure, increased cholesterol, enlarged prostate, sleep apnea, irritability/aggression and other mood disorders, and decreased fertility. I agree and understand that I and my prescriber have the following rights and responsibilities regarding my treatment plan:     1. MY RIGHTS:  To be informed of my treatment and medication plan. To be an active participant in my health and wellbeing. 2. MY RESPONSIBILITY AND UNDERSTANDING FOR USE OF MEDICATIONS   I will take medications at the dose and frequency as directed. For my safety, I will not increase or change how I take my medications without the recommendation of my healthcare provider.  I will actively participate in any program recommended by my provider which may improve function, including social, physical, psychological programs.  I will not take my medications with alcohol or other drugs not prescribed to me. I understand that drinking alcohol with my medications increases the chances of side effects, including reduced breathing rate and could lead to personal injury when operating machinery.  I understand that if I have a history of substance use disorders, including alcohol or other illicit drugs, that I may be at increased risk of addiction to my medications.  I agree to notify my provider immediately if I should become pregnant so that my treatment plan can be adjusted.    I agree and understand that I shall only receive controlled substance medications from the prescriber that signed this agreement unless there is written agreement among other prescribers of controlled substances outlining the responsibility of the medications being prescribed.  I understand that the if the controlled medication is not helping to achieve goals, the dosage may be tapered and no longer prescribed. 3. MY RESPONSIBILITY FOR COMMUNICATION / PRESCRIPTION RENEWALS   I agree that all controlled substance medications that I take will be prescribed only by my provider. If another healthcare provider prescribes me medication in an emergency, I will notify my provider within seventy-two (72) hours.  I will arrange for refills at the prescribed interval ONLY during regular office hours. I will not ask for refills earlier than agreed, after-hours, on holidays or weekends. Refills may take up to 72 hours for processing and prescriptions to reach the pharmacy.  I will inform my other health care providers that I am taking these medications and of the existence of this [id number]. In the event of an emergency, I will provide the same information to the emergency department prescribers.  I will keep my provider updated on the pharmacy I am using for controlled medication prescription filling. Initials:_______  4. MY RESPONSIBILITY FOR PROTECTING MEDICATIONS   I will protect my prescriptions and medications. I understand that lost or misplaced prescriptions will not be replaced.  I will keep medications only for my own use and will not share them with others. I will keep all medications away from children.  I agree that if my medications are adjusted or discontinued, I will properly dispose of any remaining medications. I understand that I will be required to dispose of any remaining controlled medications as, directed by my prescriber, prior to being provided with any prescriptions for other controlled medications.   Medication drop box locations can be found at: [email or web address]    5. MY RESPONSIBILITY WITH ILLEGAL DRUGS    I will not use illegal or street drugs or another person's prescription medications not prescribed to me.  If there are identified addiction type symptoms, then referral to a program may be provided by my provider and I agree to follow through with this recommendation. 6. MY RESPONSIBILITY FOR COOPERATION WITH INVESTIGATIONS   I understand that my provider will comply with any applicable law and may discuss my use and/or possible misuse/abuse of controlled substances and alcohol, as appropriate, with any health care provider involved in my care, pharmacist, or legal authority.  I authorize my provider and pharmacy to cooperate fully with law enforcement agencies (as permitted by law) in the investigation of any possible misuse, sale, or other diversion of my controlled substances.  I agree to waive any applicable privilege or right of privacy or confidentiality with respect to these authorizations. 7. PROVIDERS RIGHT TO MONITOR FOR SAFETY: PRESCRIPTION MONITORING / DRUG TESTING   I consent to drug/toxicology screening and will submit to a drug screen upon my providers request to assure I am only taking the prescribed drugs for my safety monitoring. I understand that a drug screen is a laboratory test in which a sample of my urine, blood or saliva is checked to see what drugs I have been taking. This may entail an observed urine specimen, which means that a nurse or other health care provider may watch me provide urine, and I will cooperate if I am asked to provide an observed specimen.  I understand that my provider will check a copy of my State Prescription Monitoring Program () Report in order to safely prescribe medications.  Pill Counts: I consent to pill counts when requested.   I may be asked to bring all my prescribed controlled substance medications, in their original bottles, to all of my scheduled appointments. In addition, my provider may ask me to come to the practice at any time for a random pill count. 8. TERMINATION OF THIS AGREEMENT  For my safety, my prescriber has the right to stop prescribing controlled substance medications and may end this agreement. Initials:_______   Conditions that may result in termination of this agreement:  a. I do not show any improvement in pain, or my activity has not improved. b. I develop rapid tolerance or loss of improvement, as described in my treatment plan.  c. I develop significant side effects from the medication. d. My behavior is not consistent with the responsibilities outlined above, thereby causing safety concerns to continue prescribing controlled substance medications. e. I fail to follow the terms of this agreement. f. Other:____________________________       UNDERSTANDING THIS MEDICATION AGREEMENT:    I have read the above and have had all my questions answered. For chronic disease management, I know that my symptoms can be managed with many types of treatments. A chronic medication trial may be part of my treatment, but I must be an active participant in my care. Medication therapy is only one part of my symptom management plan. In some cases, there may be limited scientific evidence to support the chronic use of certain medications to improve symptoms and daily function. Furthermore, in certain circumstances, there may be scientific information that suggests that the use of chronic controlled substances may worsen my symptoms and increase my risk of unintentional death directly related to this medication therapy. I know that if my provider feels my risk from controlled medications is greater than my benefit, I will have my controlled substance medication(s) compassionately lowered or removed altogether.      I further agree to allow this office to contact my HIPAA contact if there are concerns about my safety and use of the controlled medications. I have agreed to use the prescribed controlled substance medications to me as instructed by my provider and as stated in this Medication Agreement. My initial on each page and my signature below shows that I have read each page and I have had the opportunity to ask questions with answers provided by my provider.     Patient Name (Printed): _____________________________________  Patient Signature:  ______________________   Date: _____________    Prescriber Name (Printed): ___________________________________  Prescriber Signature: _____________________  Date: _____________

## 2021-11-09 ENCOUNTER — TELEPHONE (OUTPATIENT)
Dept: PRIMARY CARE CLINIC | Age: 46
End: 2021-11-09

## 2021-11-09 DIAGNOSIS — F90.2 ATTENTION DEFICIT HYPERACTIVITY DISORDER (ADHD), COMBINED TYPE: ICD-10-CM

## 2021-11-09 LAB
AMPHETAMINE SCREEN, URINE: NORMAL
BARBITURATE SCREEN URINE: NORMAL
BENZODIAZEPINE SCREEN, URINE: NORMAL
CANNABINOID SCREEN URINE: NORMAL
COCAINE METABOLITE SCREEN URINE: NORMAL
Lab: NORMAL
METHADONE SCREEN, URINE: NORMAL
OPIATE SCREEN URINE: NORMAL
OXYCODONE URINE: NORMAL
PH UA: 8
PHENCYCLIDINE SCREEN URINE: NORMAL
PROPOXYPHENE SCREEN: NORMAL

## 2021-11-09 RX ORDER — DEXTROAMPHETAMINE SACCHARATE, AMPHETAMINE ASPARTATE, DEXTROAMPHETAMINE SULFATE AND AMPHETAMINE SULFATE 1.25; 1.25; 1.25; 1.25 MG/1; MG/1; MG/1; MG/1
7.5 TABLET ORAL 2 TIMES DAILY
Qty: 90 TABLET | Refills: 0 | Status: SHIPPED | OUTPATIENT
Start: 2021-11-09 | End: 2022-04-28

## 2021-11-09 NOTE — PROGRESS NOTES
We got UDS/CDA on patient. Recommended her see behavioral health for further eval given phq9/gad7 scores but think she may benefit from restarting adderall. Didn't want to force issue but thought okay. If you disagree just let Dr. Smith Must and I know.   We can discuss in person too if y you'd like

## 2021-11-09 NOTE — TELEPHONE ENCOUNTER
Spoke with Dr Gayle Giordano about pt's presentation yesterday. Pt having a lot of anxiety but feels the route of it is all surrounding not being able to keep up at work. adderall worked well in the past.  Will restart (having hip surgery tomorrow and does not plan on starting medication until she gets back to work)      Does feel depression symptoms are likely situational with her only daughter going off to college, and her having hip surgery. She is agreeable to seeing a counselor to help work through some of these feelings. She states she will start looking.

## 2022-01-25 ENCOUNTER — OFFICE VISIT (OUTPATIENT)
Dept: PRIMARY CARE CLINIC | Age: 47
End: 2022-01-25

## 2022-01-25 VITALS
BODY MASS INDEX: 33.41 KG/M2 | SYSTOLIC BLOOD PRESSURE: 118 MMHG | OXYGEN SATURATION: 98 % | TEMPERATURE: 97.3 F | HEART RATE: 101 BPM | DIASTOLIC BLOOD PRESSURE: 78 MMHG | WEIGHT: 207 LBS

## 2022-01-25 DIAGNOSIS — F90.2 ATTENTION DEFICIT HYPERACTIVITY DISORDER (ADHD), COMBINED TYPE: Primary | ICD-10-CM

## 2022-01-25 DIAGNOSIS — F41.9 ANXIETY: ICD-10-CM

## 2022-01-25 DIAGNOSIS — Z13.0 SCREENING, ANEMIA, DEFICIENCY, IRON: ICD-10-CM

## 2022-01-25 DIAGNOSIS — Z13.220 SCREENING FOR HYPERLIPIDEMIA: ICD-10-CM

## 2022-01-25 DIAGNOSIS — Z11.59 ENCOUNTER FOR HEPATITIS C SCREENING TEST FOR LOW RISK PATIENT: ICD-10-CM

## 2022-01-25 DIAGNOSIS — Z13.1 SCREENING FOR DIABETES MELLITUS: ICD-10-CM

## 2022-01-25 DIAGNOSIS — J45.20 MILD INTERMITTENT EXTRINSIC ASTHMA WITHOUT COMPLICATION: ICD-10-CM

## 2022-01-25 DIAGNOSIS — Z12.31 ENCOUNTER FOR SCREENING MAMMOGRAM FOR MALIGNANT NEOPLASM OF BREAST: ICD-10-CM

## 2022-01-25 DIAGNOSIS — Z11.4 SCREENING FOR HIV (HUMAN IMMUNODEFICIENCY VIRUS): ICD-10-CM

## 2022-01-25 PROBLEM — J45.909 EXTRINSIC ASTHMA WITHOUT COMPLICATION: Status: ACTIVE | Noted: 2022-01-25

## 2022-01-25 PROBLEM — K59.09 CHRONIC CONSTIPATION: Status: RESOLVED | Noted: 2017-02-13 | Resolved: 2022-01-25

## 2022-01-25 PROBLEM — N92.0 EXCESSIVE AND FREQUENT MENSTRUATION: Status: RESOLVED | Noted: 2017-05-23 | Resolved: 2022-01-25

## 2022-01-25 PROBLEM — M25.551 RIGHT HIP PAIN: Status: RESOLVED | Noted: 2021-09-27 | Resolved: 2022-01-25

## 2022-01-25 PROCEDURE — 99214 OFFICE O/P EST MOD 30 MIN: CPT | Performed by: FAMILY MEDICINE

## 2022-01-25 RX ORDER — MONTELUKAST SODIUM 10 MG/1
10 TABLET ORAL NIGHTLY
Qty: 90 TABLET | Refills: 1 | Status: SHIPPED | OUTPATIENT
Start: 2022-01-25 | End: 2022-09-26

## 2022-01-25 RX ORDER — DEXTROAMPHETAMINE SACCHARATE, AMPHETAMINE ASPARTATE MONOHYDRATE, DEXTROAMPHETAMINE SULFATE AND AMPHETAMINE SULFATE 3.75; 3.75; 3.75; 3.75 MG/1; MG/1; MG/1; MG/1
15 CAPSULE, EXTENDED RELEASE ORAL DAILY
Qty: 30 CAPSULE | Refills: 0 | Status: SHIPPED | OUTPATIENT
Start: 2022-01-25 | End: 2022-03-25 | Stop reason: SDUPTHER

## 2022-01-25 RX ORDER — CITALOPRAM 40 MG/1
40 TABLET ORAL DAILY
Qty: 90 TABLET | Refills: 1 | Status: SHIPPED | OUTPATIENT
Start: 2022-01-25

## 2022-01-25 ASSESSMENT — ANXIETY QUESTIONNAIRES
1. FEELING NERVOUS, ANXIOUS, OR ON EDGE: 1
5. BEING SO RESTLESS THAT IT IS HARD TO SIT STILL: 1
GAD7 TOTAL SCORE: 5
6. BECOMING EASILY ANNOYED OR IRRITABLE: 0
IF YOU CHECKED OFF ANY PROBLEMS ON THIS QUESTIONNAIRE, HOW DIFFICULT HAVE THESE PROBLEMS MADE IT FOR YOU TO DO YOUR WORK, TAKE CARE OF THINGS AT HOME, OR GET ALONG WITH OTHER PEOPLE: SOMEWHAT DIFFICULT
7. FEELING AFRAID AS IF SOMETHING AWFUL MIGHT HAPPEN: 0
2. NOT BEING ABLE TO STOP OR CONTROL WORRYING: 1
3. WORRYING TOO MUCH ABOUT DIFFERENT THINGS: 1
4. TROUBLE RELAXING: 1

## 2022-01-25 ASSESSMENT — PATIENT HEALTH QUESTIONNAIRE - PHQ9
1. LITTLE INTEREST OR PLEASURE IN DOING THINGS: 1
SUM OF ALL RESPONSES TO PHQ QUESTIONS 1-9: 9
8. MOVING OR SPEAKING SO SLOWLY THAT OTHER PEOPLE COULD HAVE NOTICED. OR THE OPPOSITE, BEING SO FIGETY OR RESTLESS THAT YOU HAVE BEEN MOVING AROUND A LOT MORE THAN USUAL: 1
4. FEELING TIRED OR HAVING LITTLE ENERGY: 1
SUM OF ALL RESPONSES TO PHQ QUESTIONS 1-9: 9
3. TROUBLE FALLING OR STAYING ASLEEP: 1
7. TROUBLE CONCENTRATING ON THINGS, SUCH AS READING THE NEWSPAPER OR WATCHING TELEVISION: 1
SUM OF ALL RESPONSES TO PHQ QUESTIONS 1-9: 9
SUM OF ALL RESPONSES TO PHQ9 QUESTIONS 1 & 2: 2
6. FEELING BAD ABOUT YOURSELF - OR THAT YOU ARE A FAILURE OR HAVE LET YOURSELF OR YOUR FAMILY DOWN: 1
5. POOR APPETITE OR OVEREATING: 2
2. FEELING DOWN, DEPRESSED OR HOPELESS: 1
SUM OF ALL RESPONSES TO PHQ QUESTIONS 1-9: 9
9. THOUGHTS THAT YOU WOULD BE BETTER OFF DEAD, OR OF HURTING YOURSELF: 0

## 2022-01-25 NOTE — PROGRESS NOTES
PROGRESS NOTE     Pat Mcginnis MD  [address] , [address], [address] [facility] [place] [address]         Phone: [phone]    Date of Service:  1/25/2022     Patient ID: Dl Harley is a 55 y.o. female      Assessment / Plan:     1. Attention deficit hyperactivity disorder (ADHD), combined type  We will switch medication for short acting formulation to Adderall 15 mg XR, in order to cover more of the workday. Reviewed side effects and expected course. Patient expressed understanding.  - amphetamine-dextroamphetamine (ADDERALL XR) 15 MG extended release capsule; Take 1 capsule by mouth daily for 30 days. Dispense: 30 capsule; Refill: 0    Controlled Substance Monitoring:    Acute and Chronic Pain Monitoring:   RX Monitoring 1/25/2022   Attestation -   Periodic Controlled Substance Monitoring No signs of potential drug abuse or diversion identified. ;Possible medication side effects, risk of tolerance/dependence & alternative treatments discussed. ;Assessed functional status. 2. Anxiety  Currently well controlled now that ADHD is well controlled. Patient to continue Celexa 40 mg.    3. Mild intermittent extrinsic asthma without complication  Well-controlled with Singulair daily. HM:    Normal pap smear and neg HPV on 2/13/17    covid vaccination UTD    Hep C and HIV screen  : ordered    Ordering mammogram as is overdue. Gave number to schedule    Ordering A1c and FLP for diabetes and cholesterol screening    Subjective:     CC: allergy induced asthma, ADHD, anxiety    HPI    51-year-old white female presenting for reevaluation of the above. Patient states allergy induced asthma is currently well controlled with Singulair. She has not needed an inhaler for quite some time. At last visit in November 2021, anxiety was not well controlled.   Her ADHD medicine was restarted, and she feels that this has done wonders on her anxiety level. Most of her anxiety was due to not being able to stay on top of her work and stay organized. Currently taking Adderall 10 mg every morning. The medication is very effective of keeping her organized, getting work done in a reasonable time, without getting distracted or making mistakes. It is wearing off around lunchtime, and she is questioning if she should be on a different medication. Patient still taking Celexa 40 mg for anxiety as well. She feels his current regimen is working well. She denies any side effects      ROS:     Patient denies anorexia, nausea, vomiting, abdominal pain, involuntary weight loss, palpitations, insomnia, irritability, anxiety, headache and tremor. Vitals:    01/25/22 1430   BP: 118/78   Site: Right Upper Arm   Pulse: 101   Temp: 97.3 °F (36.3 °C)   SpO2: 98%   Weight: 207 lb (93.9 kg)       Outpatient Medications Marked as Taking for the 1/25/22 encounter (Office Visit) with Tyron Albarran MD   Medication Sig Dispense Refill    citalopram (CELEXA) 40 MG tablet Take 1 tablet by mouth daily 90 tablet 1    montelukast (SINGULAIR) 10 MG tablet Take 1 tablet by mouth nightly 90 tablet 1    amphetamine-dextroamphetamine (ADDERALL XR) 15 MG extended release capsule Take 1 capsule by mouth daily for 30 days.  30 capsule 0    albuterol sulfate HFA (VENTOLIN HFA) 108 (90 Base) MCG/ACT inhaler Inhale 2 puffs into the lungs 4 times daily as needed for Wheezing 1 Inhaler 2    Spacer/Aero-Holding Chambers ZHENG 1 Device by Does not apply route daily as needed (SOB) 1 Device 0    Cetirizine HCl (ZYRTEC PO) Take by mouth               Objective:   Constitutional:   · Reviewed vitals above  · Well Nourished, well developed, no distress       Neck:  · Symmetric and without masses  · No thyromegaly  Resp:  · Normal effort  · Clear to auscultation bilaterally without rhonchi, wheezing or crackles  Cardiovascular:  · On auscultation, normal S1 and S2 without murmurs, rubs or gallops  · No bruits of bilateral carotids and no JVD  Gastrointestinal:  · Nontender, nondistended, and no masses  · No hepatosplenomegaly  Musculoskeletal:  · Normal Gait  · All extremities without clubbing, cyanosis or edema  Skin:  · No rashes on inspection  · No areas of increased heat or induration on palpation  Psych:  · Normal mood and affect  · Normal insight and judgement        EMR Dragon/transcription disclaimer:  Much of this encounter note is electronic transcription/translation of spoken language to printed texts. The electronic translation of spoken language may be erroneous, or at times, nonsensical words or phrases may be inadvertently transcribed.   Although I have reviewed the note for such errors, some may still exist.

## 2022-02-16 ENCOUNTER — OFFICE VISIT (OUTPATIENT)
Dept: PRIMARY CARE CLINIC | Age: 47
End: 2022-02-16
Payer: COMMERCIAL

## 2022-02-16 VITALS
BODY MASS INDEX: 33.04 KG/M2 | SYSTOLIC BLOOD PRESSURE: 128 MMHG | WEIGHT: 205.6 LBS | DIASTOLIC BLOOD PRESSURE: 76 MMHG | RESPIRATION RATE: 18 BRPM | OXYGEN SATURATION: 99 % | HEART RATE: 114 BPM | TEMPERATURE: 98.4 F | HEIGHT: 66 IN

## 2022-02-16 DIAGNOSIS — R21 RASH, SKIN: Primary | ICD-10-CM

## 2022-02-16 PROCEDURE — G8427 DOCREV CUR MEDS BY ELIG CLIN: HCPCS

## 2022-02-16 PROCEDURE — G8417 CALC BMI ABV UP PARAM F/U: HCPCS

## 2022-02-16 PROCEDURE — 1036F TOBACCO NON-USER: CPT

## 2022-02-16 PROCEDURE — G8482 FLU IMMUNIZE ORDER/ADMIN: HCPCS

## 2022-02-16 PROCEDURE — 99213 OFFICE O/P EST LOW 20 MIN: CPT

## 2022-02-16 RX ORDER — VALACYCLOVIR HYDROCHLORIDE 1 G/1
1000 TABLET, FILM COATED ORAL 3 TIMES DAILY
Qty: 21 TABLET | Refills: 0 | Status: SHIPPED | OUTPATIENT
Start: 2022-02-16 | End: 2022-02-23

## 2022-02-16 RX ORDER — PREDNISONE 20 MG/1
60 TABLET ORAL DAILY
Qty: 15 TABLET | Refills: 0 | Status: SHIPPED | OUTPATIENT
Start: 2022-02-16 | End: 2022-02-21

## 2022-02-16 SDOH — ECONOMIC STABILITY: TRANSPORTATION INSECURITY
IN THE PAST 12 MONTHS, HAS LACK OF TRANSPORTATION KEPT YOU FROM MEETINGS, WORK, OR FROM GETTING THINGS NEEDED FOR DAILY LIVING?: NO

## 2022-02-16 SDOH — ECONOMIC STABILITY: TRANSPORTATION INSECURITY
IN THE PAST 12 MONTHS, HAS THE LACK OF TRANSPORTATION KEPT YOU FROM MEDICAL APPOINTMENTS OR FROM GETTING MEDICATIONS?: NO

## 2022-02-16 ASSESSMENT — PATIENT HEALTH QUESTIONNAIRE - PHQ9
SUM OF ALL RESPONSES TO PHQ QUESTIONS 1-9: 8
2. FEELING DOWN, DEPRESSED OR HOPELESS: 1
SUM OF ALL RESPONSES TO PHQ QUESTIONS 1-9: 2
8. MOVING OR SPEAKING SO SLOWLY THAT OTHER PEOPLE COULD HAVE NOTICED. OR THE OPPOSITE, BEING SO FIGETY OR RESTLESS THAT YOU HAVE BEEN MOVING AROUND A LOT MORE THAN USUAL: 1
2. FEELING DOWN, DEPRESSED OR HOPELESS: 1
SUM OF ALL RESPONSES TO PHQ QUESTIONS 1-9: 8
9. THOUGHTS THAT YOU WOULD BE BETTER OFF DEAD, OR OF HURTING YOURSELF: 0
SUM OF ALL RESPONSES TO PHQ9 QUESTIONS 1 & 2: 2
SUM OF ALL RESPONSES TO PHQ QUESTIONS 1-9: 2
7. TROUBLE CONCENTRATING ON THINGS, SUCH AS READING THE NEWSPAPER OR WATCHING TELEVISION: 1
SUM OF ALL RESPONSES TO PHQ QUESTIONS 1-9: 2
SUM OF ALL RESPONSES TO PHQ QUESTIONS 1-9: 2
SUM OF ALL RESPONSES TO PHQ QUESTIONS 1-9: 8
10. IF YOU CHECKED OFF ANY PROBLEMS, HOW DIFFICULT HAVE THESE PROBLEMS MADE IT FOR YOU TO DO YOUR WORK, TAKE CARE OF THINGS AT HOME, OR GET ALONG WITH OTHER PEOPLE: 1
4. FEELING TIRED OR HAVING LITTLE ENERGY: 1
1. LITTLE INTEREST OR PLEASURE IN DOING THINGS: 1
1. LITTLE INTEREST OR PLEASURE IN DOING THINGS: 1
5. POOR APPETITE OR OVEREATING: 1
6. FEELING BAD ABOUT YOURSELF - OR THAT YOU ARE A FAILURE OR HAVE LET YOURSELF OR YOUR FAMILY DOWN: 1
SUM OF ALL RESPONSES TO PHQ9 QUESTIONS 1 & 2: 2
3. TROUBLE FALLING OR STAYING ASLEEP: 1
SUM OF ALL RESPONSES TO PHQ QUESTIONS 1-9: 8

## 2022-02-16 ASSESSMENT — ANXIETY QUESTIONNAIRES
GAD7 TOTAL SCORE: 5
1. FEELING NERVOUS, ANXIOUS, OR ON EDGE: 1
4. TROUBLE RELAXING: 1
7. FEELING AFRAID AS IF SOMETHING AWFUL MIGHT HAPPEN: 0
6. BECOMING EASILY ANNOYED OR IRRITABLE: 0
5. BEING SO RESTLESS THAT IT IS HARD TO SIT STILL: 1
2. NOT BEING ABLE TO STOP OR CONTROL WORRYING: 1
IF YOU CHECKED OFF ANY PROBLEMS ON THIS QUESTIONNAIRE, HOW DIFFICULT HAVE THESE PROBLEMS MADE IT FOR YOU TO DO YOUR WORK, TAKE CARE OF THINGS AT HOME, OR GET ALONG WITH OTHER PEOPLE: SOMEWHAT DIFFICULT
3. WORRYING TOO MUCH ABOUT DIFFERENT THINGS: 1

## 2022-02-16 ASSESSMENT — ENCOUNTER SYMPTOMS
VOMITING: 0
NAUSEA: 0
SHORTNESS OF BREATH: 0
ABDOMINAL PAIN: 0
DIARRHEA: 0
COUGH: 0

## 2022-02-16 NOTE — PROGRESS NOTES
Active Problem List    Diagnosis Date Noted    Anxiety 01/25/2022    Extrinsic asthma without complication 15/74/9518    Herpetic gingivostomatitis 11/08/2021    Eating disorder 11/08/2021    Primary osteoarthritis of right hip 09/08/2021    Tear of right acetabular labrum 07/28/2021    ADHD     Sensorineural hearing loss (SNHL) of right ear with unrestricted hearing of left ear 12/11/2017    Hx of adenomatous polyp of colon 11/30/2017    Research study patient 06/08/2017    Chronic female pelvic pain 05/25/2017    Seasonal allergies     Polyp of corpus uteri 02/22/2010    Endometriosis of pelvic peritoneum 02/22/2010     Family History   Problem Relation Age of Onset    Colon Cancer Mother     Diabetes Father     Diabetes Maternal Aunt     Diabetes Maternal Grandmother     Heart Disease Paternal Grandmother     Diabetes Maternal Aunt     Diabetes Maternal Aunt     Diabetes Maternal Aunt     Diabetes Maternal Aunt     Diabetes Maternal Aunt      Social History     Socioeconomic History    Marital status:      Spouse name: None    Number of children: None    Years of education: None    Highest education level: None   Occupational History    None   Tobacco Use    Smoking status: Never Smoker    Smokeless tobacco: Never Used   Substance and Sexual Activity    Alcohol use:  Yes     Alcohol/week: 0.0 standard drinks     Comment: socially (a couple glasses on the week)    Drug use: No    Sexual activity: Yes     Partners: Male     Comment: , 2 children   Other Topics Concern    None   Social History Narrative    Caffeine:        SODA - 0          TEA - 0        COFFEE - 1 cup in the morning     Social Determinants of Health     Financial Resource Strain: Low Risk     Difficulty of Paying Living Expenses: Not hard at all   Food Insecurity: No Food Insecurity    Worried About Running Out of Food in the Last Year: Never true    Tommy of Food in the Last Year: Never true Transportation Needs: No Transportation Needs    Lack of Transportation (Medical): No    Lack of Transportation (Non-Medical): No   Physical Activity:     Days of Exercise per Week: Not on file    Minutes of Exercise per Session: Not on file   Stress:     Feeling of Stress : Not on file   Social Connections:     Frequency of Communication with Friends and Family: Not on file    Frequency of Social Gatherings with Friends and Family: Not on file    Attends Latter day Services: Not on file    Active Member of [address] [place] [address] or Organizations: Not on file    Attends Club or Organization Meetings: Not on file    Marital Status: Not on file   Intimate Partner Violence:     Fear of Current or Ex-Partner: Not on file    Emotionally Abused: Not on file    Physically Abused: Not on file    Sexually Abused: Not on file   Housing Stability:     Unable to Pay for Housing in the Last Year: Not on file    Number of [place] in the Last Year: Not on file    Unstable Housing in the Last Year: Not on file     Current Outpatient Medications   Medication Sig Dispense Refill    predniSONE (DELTASONE) 20 MG tablet Take 3 tablets by mouth daily for 5 days 15 tablet 0    valACYclovir (VALTREX) 1 g tablet Take 1 tablet by mouth 3 times daily for 7 days 21 tablet 0    citalopram (CELEXA) 40 MG tablet Take 1 tablet by mouth daily 90 tablet 1    montelukast (SINGULAIR) 10 MG tablet Take 1 tablet by mouth nightly 90 tablet 1    amphetamine-dextroamphetamine (ADDERALL XR) 15 MG extended release capsule Take 1 capsule by mouth daily for 30 days.  30 capsule 0    citalopram (CELEXA) 40 MG tablet Take 1 tablet by mouth daily 90 tablet 0    cyanocobalamin ([employer] VITAMIN B12) 1000 MCG tablet Take 1 tablet by mouth daily       triamcinolone (KENALOG) 0.1 % cream APPLY TOPICALLY TO THE AFFECTED AREA THREE TIMES DAILY FOR 7 DAYS      albuterol sulfate HFA (VENTOLIN HFA) 108 (90 Base) MCG/ACT inhaler Inhale 2 puffs into the lungs 4 times daily as needed for Wheezing 1 Inhaler 2    Spacer/Aero-Holding Chambers ZHENG 1 Device by Does not apply route daily as needed (SOB) 1 Device 0    Cetirizine HCl (ZYRTEC PO) Take by mouth      diphenhydrAMINE (BENADRYL) 25 MG capsule Take 25 mg by mouth every 6 hours as needed for Itching       amphetamine-dextroamphetamine (ADDERALL, 5MG,) 5 MG tablet Take 1.5 tablets by mouth 2 times daily for 30 days. 90 tablet 0     No current facility-administered medications for this visit. Allergies   Allergen Reactions    Trazodone Nausea Only and Shortness Of Breath    Pcn [Penicillins] Rash    Cephalosporins Hives    Gluten Meal Diarrhea    Other Other (See Comments)     Malt vinegar - caused blurred vision and severe migraine    Wellbutrin [Bupropion] Other (See Comments)         Objective:      /76 (Site: Right Upper Arm, Position: Sitting, Cuff Size: Medium Adult)   Pulse 114   Temp 98.4 °F (36.9 °C) (Infrared)   Resp 18   Ht 5' 6\" (1.676 m)   Wt 205 lb 9.6 oz (93.3 kg)   SpO2 99%   BMI 33.18 kg/m²     Physical Exam  Constitutional:       Appearance: Normal appearance. Cardiovascular:      Rate and Rhythm: Normal rate and regular rhythm. Pulses: Normal pulses. Heart sounds: Normal heart sounds. Pulmonary:      Effort: Pulmonary effort is normal.      Breath sounds: Normal breath sounds. Skin:     General: Skin is warm and dry. Findings: Rash present. Rash is purpuric. Rash is not crusting, pustular, scaling or vesicular. Comments: Scattered rash on left lateral hip, tender to palpation. Neurological:      General: No focal deficit present. Mental Status: She is alert and oriented to person, place, and time. Assessment:      Skin rash      Plan:     1. Rash, skin  - Previously diagnosed with post-herpetic neuralgia after evaluation after similar rash resolved.   - Possible etiologies: shingles vs vasculitis  - D/t previous diagnosis of shingles rash, will prescribe Prednisone 20 mg TID for 5 days and Valacyclovir 1g TID for 7 days  - RTC in 4 weeks to re-evaluate rash. If rash persists, will explore other possible etiologies at that time. - predniSONE (DELTASONE) 20 MG tablet; Take 3 tablets by mouth daily for 5 days  Dispense: 15 tablet; Refill: 0  - valACYclovir (VALTREX) 1 g tablet; Take 1 tablet by mouth 3 times daily for 7 days  Dispense: 21 tablet;  Refill: Jewel Ritchie DO  2/16/2022

## 2022-03-25 ENCOUNTER — OFFICE VISIT (OUTPATIENT)
Dept: PRIMARY CARE CLINIC | Age: 47
End: 2022-03-25
Payer: COMMERCIAL

## 2022-03-25 ENCOUNTER — NURSE TRIAGE (OUTPATIENT)
Dept: OTHER | Facility: CLINIC | Age: 47
End: 2022-03-25

## 2022-03-25 VITALS
DIASTOLIC BLOOD PRESSURE: 82 MMHG | WEIGHT: 208.6 LBS | OXYGEN SATURATION: 98 % | BODY MASS INDEX: 33.52 KG/M2 | HEART RATE: 75 BPM | HEIGHT: 66 IN | TEMPERATURE: 98.2 F | SYSTOLIC BLOOD PRESSURE: 118 MMHG

## 2022-03-25 DIAGNOSIS — L73.2 AXILLARY HIDRADENITIS SUPPURATIVA: Primary | ICD-10-CM

## 2022-03-25 DIAGNOSIS — F90.2 ATTENTION DEFICIT HYPERACTIVITY DISORDER (ADHD), COMBINED TYPE: ICD-10-CM

## 2022-03-25 DIAGNOSIS — F41.9 ANXIETY: ICD-10-CM

## 2022-03-25 PROCEDURE — 1036F TOBACCO NON-USER: CPT

## 2022-03-25 PROCEDURE — G8427 DOCREV CUR MEDS BY ELIG CLIN: HCPCS

## 2022-03-25 PROCEDURE — G8482 FLU IMMUNIZE ORDER/ADMIN: HCPCS

## 2022-03-25 PROCEDURE — 99214 OFFICE O/P EST MOD 30 MIN: CPT

## 2022-03-25 PROCEDURE — G8417 CALC BMI ABV UP PARAM F/U: HCPCS

## 2022-03-25 RX ORDER — CITALOPRAM 40 MG/1
40 TABLET ORAL DAILY
Qty: 90 TABLET | Refills: 0 | Status: SHIPPED | OUTPATIENT
Start: 2022-03-25 | End: 2022-04-28 | Stop reason: SDUPTHER

## 2022-03-25 RX ORDER — DEXTROAMPHETAMINE SACCHARATE, AMPHETAMINE ASPARTATE MONOHYDRATE, DEXTROAMPHETAMINE SULFATE AND AMPHETAMINE SULFATE 3.75; 3.75; 3.75; 3.75 MG/1; MG/1; MG/1; MG/1
15 CAPSULE, EXTENDED RELEASE ORAL DAILY
Qty: 30 CAPSULE | Refills: 0 | Status: SHIPPED | OUTPATIENT
Start: 2022-03-25 | End: 2022-04-28 | Stop reason: SDUPTHER

## 2022-03-25 RX ORDER — DOXYCYCLINE HYCLATE 100 MG
100 TABLET ORAL 2 TIMES DAILY
Qty: 20 TABLET | Refills: 0 | Status: SHIPPED | OUTPATIENT
Start: 2022-03-25 | End: 2022-04-04

## 2022-03-25 ASSESSMENT — PATIENT HEALTH QUESTIONNAIRE - PHQ9
SUM OF ALL RESPONSES TO PHQ QUESTIONS 1-9: 5
SUM OF ALL RESPONSES TO PHQ9 QUESTIONS 1 & 2: 2
4. FEELING TIRED OR HAVING LITTLE ENERGY: 1
SUM OF ALL RESPONSES TO PHQ QUESTIONS 1-9: 5
SUM OF ALL RESPONSES TO PHQ QUESTIONS 1-9: 5
1. LITTLE INTEREST OR PLEASURE IN DOING THINGS: 1
6. FEELING BAD ABOUT YOURSELF - OR THAT YOU ARE A FAILURE OR HAVE LET YOURSELF OR YOUR FAMILY DOWN: 1
5. POOR APPETITE OR OVEREATING: 0
10. IF YOU CHECKED OFF ANY PROBLEMS, HOW DIFFICULT HAVE THESE PROBLEMS MADE IT FOR YOU TO DO YOUR WORK, TAKE CARE OF THINGS AT HOME, OR GET ALONG WITH OTHER PEOPLE: 1
SUM OF ALL RESPONSES TO PHQ QUESTIONS 1-9: 5
2. FEELING DOWN, DEPRESSED OR HOPELESS: 1
9. THOUGHTS THAT YOU WOULD BE BETTER OFF DEAD, OR OF HURTING YOURSELF: 0
3. TROUBLE FALLING OR STAYING ASLEEP: 1
8. MOVING OR SPEAKING SO SLOWLY THAT OTHER PEOPLE COULD HAVE NOTICED. OR THE OPPOSITE, BEING SO FIGETY OR RESTLESS THAT YOU HAVE BEEN MOVING AROUND A LOT MORE THAN USUAL: 0
7. TROUBLE CONCENTRATING ON THINGS, SUCH AS READING THE NEWSPAPER OR WATCHING TELEVISION: 0

## 2022-03-25 ASSESSMENT — ANXIETY QUESTIONNAIRES
GAD7 TOTAL SCORE: 3
1. FEELING NERVOUS, ANXIOUS, OR ON EDGE: 0
4. TROUBLE RELAXING: 1
5. BEING SO RESTLESS THAT IT IS HARD TO SIT STILL: 1
IF YOU CHECKED OFF ANY PROBLEMS ON THIS QUESTIONNAIRE, HOW DIFFICULT HAVE THESE PROBLEMS MADE IT FOR YOU TO DO YOUR WORK, TAKE CARE OF THINGS AT HOME, OR GET ALONG WITH OTHER PEOPLE: NOT DIFFICULT AT ALL
3. WORRYING TOO MUCH ABOUT DIFFERENT THINGS: 1
7. FEELING AFRAID AS IF SOMETHING AWFUL MIGHT HAPPEN: 0
2. NOT BEING ABLE TO STOP OR CONTROL WORRYING: 0
6. BECOMING EASILY ANNOYED OR IRRITABLE: 0

## 2022-03-25 NOTE — PROGRESS NOTES
[facility]  [address] [facility], [address], [address] [facility] [address]  Phone: [phone]      Name:  Ailyn العراقي  :    1975    Consultants:   Patient Care Team:  Korin Epps MD as PCP - General (Family Medicine)  Zion Eden as PCP - OBGYN  Korin Epps MD as PCP - [facility] Empaneled Provider  Zion Eden    Chief Complaint:     Ailyn العراقي is a 55 y.o. female who presents today for an established patient care visit with Personalized Prevention Plan Services as noted below. HPI:     Ailyn العراقي is a 55 y.o. female with a Hx of asthma, ADHD on Adderall, anxiety who presents today for acute concerns of bilateral axillary soreness and L axillary lump. Bilateral axillary soreness, Left axillary painful lump  Reports moving a heavy box 2 days ago, with some resultant bilateral axillary soreness. Soreness has improved but she noticed a lump in her left armpit yesterday. Lump is 0.5 x 1.0 cm, firm, and very tender to touch. Denies any drainage, redness of skin, or fever/chills. Has never had this occur before. Denies any new creams, lotions but does report daily shaving of axillary hair. Attention deficit hyperactivity disorder (ADHD), combined type  Has been taking Adderall XR 15 mg daily for several years. Denies any changes to concentration, focus. Requesting refill of medication. Anxiety  Has been taking Citalopram (Celexa) 40 mg daily. Denies any concerns of overwhelming concerns, racing thoughts. Denies any lightheadedness, dizziness, palpitations. Requesting refill of medication.          Patient Active Problem List   Diagnosis    Seasonal allergies    Hx of adenomatous polyp of colon    ADHD    Tear of right acetabular labrum    Sensorineural hearing loss (SNHL) of right ear with unrestricted hearing of left ear    Research study patient    Primary osteoarthritis of right hip    Polyp of corpus uteri    Herpetic gingivostomatitis    Endometriosis of pelvic peritoneum    Eating disorder    Chronic female pelvic pain    Anxiety    Extrinsic asthma without complication       Past Medical History:    Past Medical History:   Diagnosis Date    ADHD     Anxiety     Extrinsic asthma without complication     Kidney stone     MDD (major depressive disorder)     has been on zoloft, paxil, celexa. (had severe HAs, and nausea on wellbutrin)    Seasonal allergies        Past Surgical History:  Past Surgical History:   Procedure Laterality Date    ABDOMINAL EXPLORATION SURGERY      BREAST ENHANCEMENT SURGERY      TOTAL HIP ARTHROPLASTY  12/2021    TUBAL LIGATION         Home Meds:  Prior to Visit Medications    Medication Sig Taking? Authorizing Provider   amphetamine-dextroamphetamine (ADDERALL XR) 15 MG extended release capsule Take 1 capsule by mouth daily for 30 days. Yes Camacho Castillo.  Alma Ferreira, DO   citalopram (CELEXA) 40 MG tablet Take 1 tablet by mouth daily Yes Qiana Newby,    doxycycline hyclate (VIBRA-TABS) 100 MG tablet Take 1 tablet by mouth 2 times daily for 10 days Yes Qiana Newby DO   citalopram (CELEXA) 40 MG tablet Take 1 tablet by mouth daily Yes Jazmyn Pizarro MD   montelukast (SINGULAIR) 10 MG tablet Take 1 tablet by mouth nightly Yes Danny Plaza MD   cyanocobalamin ([employer] VITAMIN B12) 1000 MCG tablet Take 1 tablet by mouth daily  Yes Historical Provider, MD   triamcinolone (KENALOG) 0.1 % cream APPLY TOPICALLY TO THE AFFECTED AREA THREE TIMES DAILY FOR 7 DAYS Yes Historical Provider, MD   albuterol sulfate HFA (VENTOLIN HFA) 108 (90 Base) MCG/ACT inhaler Inhale 2 puffs into the lungs 4 times daily as needed for Wheezing Yes Danny Plaza MD   Spacer/Aero-Holding Chambers ZHENG 1 Device by Does not apply route daily as needed (SOB) Yes Danny Plaza MD   Cetirizine HCl (ZYRTEC PO) Take by mouth Yes Historical Provider, MD   diphenhydrAMINE (BENADRYL) 25 MG capsule Take 25 mg by mouth every 6 hours as needed for Itching  Yes Historical Provider, MD   amphetamine-dextroamphetamine (ADDERALL, 5MG,) 5 MG tablet Take 1.5 tablets by mouth 2 times daily for 30 days.   Felicia Geller MD       Allergies:    Trazodone, Pcn [penicillins], Cephalosporins, Gluten meal, Other, and Wellbutrin [bupropion]    Family History:       Problem Relation Age of Onset    Colon Cancer Mother     Diabetes Father     Diabetes Maternal Aunt     Diabetes Maternal Grandmother     Heart Disease Paternal Grandmother     Diabetes Maternal Aunt     Diabetes Maternal Aunt     Diabetes Maternal Aunt     Diabetes Maternal Aunt     Diabetes Maternal Aunt        Social History:  Social History     Tobacco History     Smoking Status  Never Smoker    Smokeless Tobacco Use  Never Used          Alcohol History     Alcohol Use Status  Yes Drinks/Week  0 Standard drinks or equivalent per week Amount  0.0 standard drinks of alcohol/wk Comment  socially (a couple glasses on the week)          Drug Use     Drug Use Status  No          Sexual Activity     Sexually Active  Yes Partners  Male Comment  , 2 children                   Health Maintenance Completed:  Health Maintenance   Topic Date Due    Hepatitis C screen  Never done    Pneumococcal 0-64 years Vaccine (1 of 2 - PPSV23) Never done    HIV screen  Never done    Cervical cancer screen  02/13/2020    Depression Monitoring  03/25/2023    Lipid screen  09/10/2023    Colorectal Cancer Screen  11/08/2024    DTaP/Tdap/Td vaccine (3 - Td or Tdap) 08/31/2025    Flu vaccine  Completed    COVID-19 Vaccine  Completed    Hepatitis A vaccine  Aged Out    Hepatitis B vaccine  Aged Out    Hib vaccine  Aged Out    Meningococcal (ACWY) vaccine  Aged [employer] History   Administered Date(s) Administered    COVID-19, Moderna, Booster, PF, 50mcg/0.25ml 12/24/2021    COVID-19, Moderna, Primary or Immunocompromised, PF, 100mcg/0.5mL 03/16/2021, 04/13/2021, 12/24/2021    Influenza Vaccine, unspecified formulation 10/10/2016    Influenza Virus Vaccine 10/17/2013    Influenza, MDCK Quadv, with preserv IM (Flucelvax 2 yrs and older) 09/30/2021    Influenza, Quadv, IM, (6 mo and older Fluzone, Flulaval, Fluarix and 3 yrs and older Afluria) 11/30/2017    Influenza, Quadv, IM, PF (6 mo and older Fluzone, Flulaval, Fluarix, and 3 yrs and older Afluria) 12/03/2018    Td, unspecified formulation 10/22/1991    Tdap (Boostrix, Adacel) 08/31/2015         Review of Systems:  Review of Systems   Constitutional: Negative for chills, fatigue and fever. Respiratory: Negative for cough and shortness of breath. Cardiovascular: Negative for chest pain and palpitations. Gastrointestinal: Negative for abdominal pain, diarrhea, nausea and vomiting. Genitourinary: Negative for dysuria. Musculoskeletal: Negative for myalgias. Skin: Negative for rash. Small lump on L axilla. No drainage, but painful to palpation or when moving her Left arm up above her head. Neurological: Negative for light-headedness and headaches. Psychiatric/Behavioral: Negative for decreased concentration, self-injury, sleep disturbance and suicidal ideas. The patient is not nervous/anxious. Physical Exam:   Vitals:    03/25/22 1136   BP: 118/82   Pulse: 75   Temp: 98.2 °F (36.8 °C)   TempSrc: Temporal   SpO2: 98%   Weight: 208 lb 9.6 oz (94.6 kg)   Height: 5' 6\" (1.676 m)     Body mass index is 33.67 kg/m². Wt Readings from Last 3 Encounters:   03/25/22 208 lb 9.6 oz (94.6 kg)   02/16/22 205 lb 9.6 oz (93.3 kg)   01/25/22 207 lb (93.9 kg)       BP Readings from Last 3 Encounters:   03/25/22 118/82   02/16/22 128/76   01/25/22 118/78       Physical Exam  Constitutional:       Appearance: Normal appearance. Cardiovascular:      Rate and Rhythm: Normal rate and regular rhythm. Pulses: Normal pulses.       Heart sounds: Normal heart sounds. Pulmonary:      Effort: Pulmonary effort is normal.      Breath sounds: Normal breath sounds. Abdominal:      General: Abdomen is flat. Palpations: Abdomen is soft. Tenderness: There is no abdominal tenderness. There is no guarding or rebound. Musculoskeletal:         General: No swelling or tenderness. Normal range of motion. Skin:     General: Skin is warm and dry. Capillary Refill: Capillary refill takes less than 2 seconds. Findings: Lesion present. No erythema or rash. Comments: 0.5 x 1.0 cm hard, mobile, lump located in L axilla. Tender to palpation. No erythema surrounding lump. Neurological:      General: No focal deficit present. Mental Status: She is alert and oriented to person, place, and time. Lab Review:   No visits with results within 2 Month(s) from this visit. Latest known visit with results is:   Office Visit on 11/08/2021   Component Date Value    Amphetamine Screen, Urine 11/08/2021 Neg     Barbiturate Screen, Ur 11/08/2021 Neg     Benzodiazepine Screen, U* 11/08/2021 Neg     Cannabinoid Scrn, Ur 11/08/2021 Neg     Cocaine Metabolite Scree* 11/08/2021 Neg     Opiate Scrn, Ur 11/08/2021 Neg     PCP Screen, Urine 11/08/2021 Neg     Methadone Screen, Urine 11/08/2021 Neg     Propoxyphene Scrn, Ur 11/08/2021 Neg     Oxycodone Urine 11/08/2021 Neg     pH, UA 11/08/2021 8.0     Drug Screen Comment: 11/08/2021 see below           Assessment/Plan:  Sidra Smiley is a [address] y.o. female with a Hx of asthma, ADHD on Adderall, anxiety for acute concerns of soreness in bilateral axilla and lump in Left axilla. Thierno Velásquez was seen today for swelling. Diagnoses and all orders for this visit:    Axillary hidradenitis suppurativa   - L axilla lump 0.5 x 1.0 cm, firm, mobile, painful w/o drainage, erythema. Observed for >24 hrs.   - Reports daily shaving of armpits.   - Possible etiologies include hidradenitis suppurative d/t painful, mobile lump and daily shaving aggravation. Less likely etiologies include cellulitis (lack of erythema, fever) or abscess (lack of drainage, not a fluctuant mass)  - Recommend warm compresses to be applied for 15 mins a few times a day to promote loosening of occluded glands. - Bilateral axillary soreness likely muscular strain d/t moving heavy boxes. - D/t suspicion of hidradenitis suppurativa vs cellulitis/abscess, treat prophylactically with Doxycycline 100 mg BID for 10 days.  - Recommend f/u in 1-2 weeks for reassessment of symptoms  -     doxycycline hyclate (VIBRA-TABS) 100 MG tablet; Take 1 tablet by mouth 2 times daily for 10 days    Attention deficit hyperactivity disorder (ADHD), combined type  - Controlled, on Adderall  - Current dosage: Adderall XR 15 mg daily  - PHQ-9 Total Score: 5 (3/25/2022 11:44 AM)  Thoughts that you would be better off dead, or of hurting yourself in some way: 0 (3/25/2022 11:44 AM)  - CDA signed on 11/9/2021  - Pills: Refill requested today. - UDS 11/08/2021: negative, had stopped taking Adderall for about a yr at the time of UDS  - f/u with Dr. Mike Laboy for completion of med check requirements   - amphetamine-dextroamphetamine (ADDERALL XR) 15 MG extended release capsule    Anxiety  JUAN M 7 SCORE 3/25/2022 2/16/2022 1/25/2022 1/24/2018 12/27/2017 12/7/2017   JUAN M-7 Total Score 3 5 5 - - -   JUAN M-7 Total Score - - - 7 12 15   Interpretation of JUAN M-7 score: 5-9 = mild anxiety, 10-14 = moderate anxiety, 15+ = severe anxiety. Recommend referral to behavioral health for scores 10 or greater. - 2/16/2022 Last JUAN M-7 score 5  - Today 3/26/2022 JUAN M-7 score 3  - Well managed, on medication Citalopram (Celexa) 40 mg daily. Refill placed today. - Will reassess JUAN M-7 at next visit  -     citalopram (CELEXA) 40 MG tablet;  Take 1 tablet by mouth daily      Health Maintenance Due:  Health Maintenance Due   Topic Date Due    Hepatitis C screen  Never done    Pneumococcal 0-64 years Vaccine (1 of 2 - PPSV23) Never done    HIV screen  Never done    Cervical cancer screen  02/13/2020        Health care decision maker:  <72years old      Health Maintenance: (USPSTF Recommendations)  (F) Breast Cancer Screen: (40-49 (C), 50-74 biennial screening mammogram (B)): ordered at previous visit  (F) Cervical Cancer Screen: (21-29 q3yr cytology alone; 30-65 q3yr cytology alone, q5yr with hrHPV alone, or q5yr cytology+hrHPV (A)):  CRC/Colonoscopy Screening: (adults 45-49 (B), 50-75 (A))  Lung Ca Screening: Annual LDCT (+smoker age 49-80, smoked within 15 years, total of 20 pack yr history (B)):  DEXA Screen: (women >65 and older, <65 if at risk/postmenopausal (B)): HIV Screen: (16-65 yr old, and all pregnant patients (A)): Hep C Screen: (18-79 yr old (B)):  HCC Screen: (all pts with cirrhosis and high risk Hep B (US q6 mo)):  Immunizations: Healthcare maintenance not discussed at this visit. RTC:  Return in about 2 weeks (around 4/8/2022) for f/u L axilla lump. EMR Dragon/transcription disclaimer:  Much of this encounter note is electronic transcription/translation of spoken language to printed texts. The electronic translation of spoken language may be erroneous, or at times, nonsensical words or phrases may be inadvertently transcribed.   Although I have reviewed the note for such errors, some may still exist.     Hipolito Bello, DO  3/26/2022

## 2022-03-25 NOTE — TELEPHONE ENCOUNTER
Received call from Ken Antony at [facility] with Red Flag Complaint. Subjective: swelling under left armpit. Hard feeling knot. Both armpits were feeling sore a few days ago. Then yesterday morning was really sore on left underarm. Pea size. Does not seem to move when touched. Current Symptoms: soreness under both armpits. Hard, pea-sized swelling under left armpit. Onset: 1 day ago; unchanged    Associated Symptoms: NA    Pain Severity: 3/10; sore; constant    Temperature: denies fever by unknown method    What has been tried: Advil    LMP: doesn't have periods Pregnant: No    Recommended disposition: See in Office Today. Advised to go to walk in clinic or C  if no appts available. Care advice provided, patient verbalizes understanding; denies any other questions or concerns; instructed to call back for any new or worsening symptoms. Patient/Caller agrees with recommended disposition; writer provided warm transfer to Swagsy at [facility] for appointment scheduling     Attention Provider: Thank you for allowing me to participate in the care of your patient. The patient was connected to triage in response to information provided to the ECC/PSC. Please do not respond through this encounter as the response is not directed to a shared pool.             Reason for Disposition   Swelling is painful to touch and no fever    Protocols used: SKIN LUMP OR LOCALIZED SWELLING-ADULT-OH

## 2022-03-26 ASSESSMENT — ENCOUNTER SYMPTOMS
DIARRHEA: 0
VOMITING: 0
COUGH: 0
NAUSEA: 0
ABDOMINAL PAIN: 0
SHORTNESS OF BREATH: 0

## 2022-04-28 ENCOUNTER — OFFICE VISIT (OUTPATIENT)
Dept: PRIMARY CARE CLINIC | Age: 47
End: 2022-04-28
Payer: COMMERCIAL

## 2022-04-28 VITALS
SYSTOLIC BLOOD PRESSURE: 110 MMHG | WEIGHT: 209 LBS | HEART RATE: 100 BPM | BODY MASS INDEX: 33.73 KG/M2 | TEMPERATURE: 97.7 F | OXYGEN SATURATION: 98 % | DIASTOLIC BLOOD PRESSURE: 72 MMHG

## 2022-04-28 DIAGNOSIS — F90.2 ATTENTION DEFICIT HYPERACTIVITY DISORDER (ADHD), COMBINED TYPE: ICD-10-CM

## 2022-04-28 PROCEDURE — G8417 CALC BMI ABV UP PARAM F/U: HCPCS | Performed by: FAMILY MEDICINE

## 2022-04-28 PROCEDURE — 1036F TOBACCO NON-USER: CPT | Performed by: FAMILY MEDICINE

## 2022-04-28 PROCEDURE — G8427 DOCREV CUR MEDS BY ELIG CLIN: HCPCS | Performed by: FAMILY MEDICINE

## 2022-04-28 PROCEDURE — 99214 OFFICE O/P EST MOD 30 MIN: CPT | Performed by: FAMILY MEDICINE

## 2022-04-28 RX ORDER — DOXYCYCLINE HYCLATE 100 MG
100 TABLET ORAL 2 TIMES DAILY
COMMUNITY

## 2022-04-28 RX ORDER — DEXTROAMPHETAMINE SACCHARATE, AMPHETAMINE ASPARTATE MONOHYDRATE, DEXTROAMPHETAMINE SULFATE AND AMPHETAMINE SULFATE 3.75; 3.75; 3.75; 3.75 MG/1; MG/1; MG/1; MG/1
15 CAPSULE, EXTENDED RELEASE ORAL DAILY
Qty: 30 CAPSULE | Refills: 0 | Status: SHIPPED | OUTPATIENT
Start: 2022-04-28 | End: 2022-11-03 | Stop reason: SDUPTHER

## 2022-04-28 ASSESSMENT — ANXIETY QUESTIONNAIRES
IF YOU CHECKED OFF ANY PROBLEMS ON THIS QUESTIONNAIRE, HOW DIFFICULT HAVE THESE PROBLEMS MADE IT FOR YOU TO DO YOUR WORK, TAKE CARE OF THINGS AT HOME, OR GET ALONG WITH OTHER PEOPLE: SOMEWHAT DIFFICULT
2. NOT BEING ABLE TO STOP OR CONTROL WORRYING: 1
5. BEING SO RESTLESS THAT IT IS HARD TO SIT STILL: 1
3. WORRYING TOO MUCH ABOUT DIFFERENT THINGS: 1
7. FEELING AFRAID AS IF SOMETHING AWFUL MIGHT HAPPEN: 1
4. TROUBLE RELAXING: 2
6. BECOMING EASILY ANNOYED OR IRRITABLE: 0
1. FEELING NERVOUS, ANXIOUS, OR ON EDGE: 1
GAD7 TOTAL SCORE: 7

## 2022-04-28 ASSESSMENT — PATIENT HEALTH QUESTIONNAIRE - PHQ9
SUM OF ALL RESPONSES TO PHQ QUESTIONS 1-9: 12
SUM OF ALL RESPONSES TO PHQ9 QUESTIONS 1 & 2: 3
3. TROUBLE FALLING OR STAYING ASLEEP: 2
6. FEELING BAD ABOUT YOURSELF - OR THAT YOU ARE A FAILURE OR HAVE LET YOURSELF OR YOUR FAMILY DOWN: 2
SUM OF ALL RESPONSES TO PHQ QUESTIONS 1-9: 13
9. THOUGHTS THAT YOU WOULD BE BETTER OFF DEAD, OR OF HURTING YOURSELF: 1
10. IF YOU CHECKED OFF ANY PROBLEMS, HOW DIFFICULT HAVE THESE PROBLEMS MADE IT FOR YOU TO DO YOUR WORK, TAKE CARE OF THINGS AT HOME, OR GET ALONG WITH OTHER PEOPLE: 1
SUM OF ALL RESPONSES TO PHQ QUESTIONS 1-9: 13
1. LITTLE INTEREST OR PLEASURE IN DOING THINGS: 2
5. POOR APPETITE OR OVEREATING: 1
7. TROUBLE CONCENTRATING ON THINGS, SUCH AS READING THE NEWSPAPER OR WATCHING TELEVISION: 1
SUM OF ALL RESPONSES TO PHQ QUESTIONS 1-9: 13
4. FEELING TIRED OR HAVING LITTLE ENERGY: 2
8. MOVING OR SPEAKING SO SLOWLY THAT OTHER PEOPLE COULD HAVE NOTICED. OR THE OPPOSITE, BEING SO FIGETY OR RESTLESS THAT YOU HAVE BEEN MOVING AROUND A LOT MORE THAN USUAL: 1
2. FEELING DOWN, DEPRESSED OR HOPELESS: 1

## 2022-04-28 ASSESSMENT — ENCOUNTER SYMPTOMS
SHORTNESS OF BREATH: 0
CHEST TIGHTNESS: 0
WHEEZING: 0
COUGH: 0

## 2022-04-28 ASSESSMENT — COLUMBIA-SUICIDE SEVERITY RATING SCALE - C-SSRS
1. WITHIN THE PAST MONTH, HAVE YOU WISHED YOU WERE DEAD OR WISHED YOU COULD GO TO SLEEP AND NOT WAKE UP?: NO
2. HAVE YOU ACTUALLY HAD ANY THOUGHTS OF KILLING YOURSELF?: NO
6. HAVE YOU EVER DONE ANYTHING, STARTED TO DO ANYTHING, OR PREPARED TO DO ANYTHING TO END YOUR LIFE?: NO

## 2022-04-28 NOTE — PROGRESS NOTES
PROGRESS NOTE     David Kendrick MD  [address], [address] [place], [address] [facility] [place] [address]         Phone: [phone]    Date of Service:  4/28/2022     Patient ID: Dl Harley is a 55 y.o. female      Assessment / Plan:     1. Attention deficit hyperactivity disorder (ADHD), combined type  - Well controlled  - Continue Adderall 15 mg XR as this formulation is working throughout the whole day and patient is tolerating well without side effects.   - amphetamine-dextroamphetamine (ADDERALL XR) 15 MG extended release capsule; Take 1 capsule by mouth daily for 30 days. Dispense: 30 capsule; Refill: 0    2. Left lateral torso tenderness  - Given reproducibility of pain on palpation and short duration of symptom, likely musculoskeletal etiology. Patient denied substernal chest pain, palpitations, shortness of breath or radiation to shoulder that would be more concerning for cardiac or pulmonary etiology. Less likely to be neuralgia given lack of pain with light touch. - Advised patient to use tylenol/ibuprofen and if pain worsens or persists, can consider imaging for further assessment. 3. Anxiety   - Well controlled with Celexa 40mg, continue medication daily    4. Right hip pain s/p total hip replacement   - Patient advised to follow up with original orthopedic surgeon who performed the procedure in case further imaging is needed to assess hardware, etc. After that, a second opinion could be considered. Patient agreeable. 5. Health Maintenance  - Advised patient to get screening labs (A1C, Lipids, HIV and Hep C) as soon as possible   - Mammogram order placed, patient will scheduled  - Pap smear + HPV co-testing due this year, patient aware    Follow up in 6 months for chronic care.     Subjective:     CC: ADHD, Anxiety, hip pain, lateral torso tenderness    HPI  Nica Menard is a 55year old chest tightness, shortness of breath and wheezing. Cardiovascular: Negative for chest pain, palpitations and leg swelling. Musculoskeletal: Positive for gait problem. Skin: Negative for rash and wound. Psychiatric/Behavioral: Negative for agitation. The patient is not nervous/anxious. Vitals:    04/28/22 1235   BP: 110/72   Site: Left Upper Arm   Position: Sitting   Cuff Size: Large Adult   Pulse: 100   Temp: 97.7 °F (36.5 °C)   TempSrc: Temporal   SpO2: 98%   Weight: 209 lb (94.8 kg)       Outpatient Medications Marked as Taking for the 4/28/22 encounter (Office Visit) with Olga Chance MD   Medication Sig Dispense Refill    doxycycline hyclate (VIBRA-TABS) 100 MG tablet Take 100 mg by mouth 2 times daily      amphetamine-dextroamphetamine (ADDERALL XR) 15 MG extended release capsule Take 1 capsule by mouth daily for 30 days. 30 capsule 0    citalopram (CELEXA) 40 MG tablet Take 1 tablet by mouth daily 90 tablet 1    montelukast (SINGULAIR) 10 MG tablet Take 1 tablet by mouth nightly 90 tablet 1    cyanocobalamin ([employer]S VITAMIN B12) 1000 MCG tablet Take 1 tablet by mouth daily       triamcinolone (KENALOG) 0.1 % cream APPLY TOPICALLY TO THE AFFECTED AREA THREE TIMES DAILY FOR 7 DAYS      albuterol sulfate HFA (VENTOLIN HFA) 108 (90 Base) MCG/ACT inhaler Inhale 2 puffs into the lungs 4 times daily as needed for Wheezing 1 Inhaler 2    Cetirizine HCl (ZYRTEC PO) Take by mouth      diphenhydrAMINE (BENADRYL) 25 MG capsule Take 25 mg by mouth every 6 hours as needed for Itching            Objective:   Constitutional:   Gen: The patient is in no acute distress. CV: regular rate, regular rhythm, normal S1/S2, no murmurs noted  Lungs: no increase work of breathing, symmetric chest rise, lungs clear to auscultation in all lung fields without wheeze, rales or rhonchi  Abd: soft, non distended. Left lateral torso mildly tender to palpation at level of lower ribs.  No associated bruising, rash

## 2022-04-28 NOTE — PROGRESS NOTES
After medical student evaluation and exam, I independently gathered patients history, independently did a physical and agree with A&P as written in medical student's note. Will add:    ADHD:  adderal 15mg XR working great, refilled    Controlled Substance Monitoring:    Acute and Chronic Pain Monitoring:   RX Monitoring 4/28/2022   Attestation -   Periodic Controlled Substance Monitoring No signs of potential drug abuse or diversion identified. Anxiety:  Controlled celexa 40mg. Allergy induced asthma:  Controlled with singulair    Left sided CP, under axilla:   Reproducible and likely musculoskeletal.  Will monitor. No eval needed now unless sx's persist and fail to resolve      --pt to get pending fasting labs that were ordered at last visit.

## 2022-09-26 RX ORDER — MONTELUKAST SODIUM 10 MG/1
TABLET ORAL
Qty: 90 TABLET | Refills: 1 | Status: SHIPPED | OUTPATIENT
Start: 2022-09-26

## 2022-09-26 NOTE — TELEPHONE ENCOUNTER
Refill Request       Last Seen: Last Seen Department: 4/28/2022  Last Seen by PCP: 4/28/2022    Last Written: 01/25/2022    Next Appointment:   Future Appointments   Date Time Provider Rc Ko   11/3/2022 12:30 PM MD Gianna Hines [facility]             Requested Prescriptions     Pending Prescriptions Disp Refills    montelukast (SINGULAIR) 10 MG tablet [Pharmacy Med Name: MONTELUKAST 10MG TABLETS] 90 tablet 1     Sig: TAKE 1 TABLET BY MOUTH EVERY NIGHT

## 2022-10-04 ENCOUNTER — NURSE TRIAGE (OUTPATIENT)
Dept: OTHER | Facility: CLINIC | Age: 47
End: 2022-10-04

## 2022-10-04 ENCOUNTER — OFFICE VISIT (OUTPATIENT)
Dept: PRIMARY CARE CLINIC | Age: 47
End: 2022-10-04
Payer: COMMERCIAL

## 2022-10-04 VITALS
BODY MASS INDEX: 34.61 KG/M2 | OXYGEN SATURATION: 98 % | TEMPERATURE: 97.2 F | DIASTOLIC BLOOD PRESSURE: 82 MMHG | SYSTOLIC BLOOD PRESSURE: 124 MMHG | WEIGHT: 214.4 LBS | HEART RATE: 82 BPM

## 2022-10-04 DIAGNOSIS — M22.2X2 PATELLOFEMORAL PAIN SYNDROME OF LEFT KNEE: Primary | ICD-10-CM

## 2022-10-04 DIAGNOSIS — Z23 NEED FOR PROPHYLACTIC VACCINATION AGAINST STREPTOCOCCUS PNEUMONIAE (PNEUMOCOCCUS): ICD-10-CM

## 2022-10-04 DIAGNOSIS — Z00.00 HEALTHCARE MAINTENANCE: ICD-10-CM

## 2022-10-04 PROCEDURE — 99213 OFFICE O/P EST LOW 20 MIN: CPT | Performed by: STUDENT IN AN ORGANIZED HEALTH CARE EDUCATION/TRAINING PROGRAM

## 2022-10-04 PROCEDURE — 90677 PCV20 VACCINE IM: CPT | Performed by: FAMILY MEDICINE

## 2022-10-04 PROCEDURE — 90472 IMMUNIZATION ADMIN EACH ADD: CPT | Performed by: FAMILY MEDICINE

## 2022-10-04 PROCEDURE — 90674 CCIIV4 VAC NO PRSV 0.5 ML IM: CPT | Performed by: FAMILY MEDICINE

## 2022-10-04 PROCEDURE — 90471 IMMUNIZATION ADMIN: CPT | Performed by: FAMILY MEDICINE

## 2022-10-04 RX ORDER — ALBUTEROL SULFATE 90 UG/1
2 AEROSOL, METERED RESPIRATORY (INHALATION) 4 TIMES DAILY PRN
Qty: 1 EACH | Refills: 2 | Status: SHIPPED | OUTPATIENT
Start: 2022-10-04

## 2022-10-04 ASSESSMENT — ANXIETY QUESTIONNAIRES
GAD7 TOTAL SCORE: 9
4. TROUBLE RELAXING: 2-OVER HALF THE DAYS
6. BECOMING EASILY ANNOYED OR IRRITABLE: 0-NOT AT ALL
5. BEING SO RESTLESS THAT IT IS HARD TO SIT STILL: 1-SEVERAL DAYS
3. WORRYING TOO MUCH ABOUT DIFFERENT THINGS: 2-OVER HALF THE DAYS
2. NOT BEING ABLE TO STOP OR CONTROL WORRYING: 2-OVER HALF THE DAYS
7. FEELING AFRAID AS IF SOMETHING AWFUL MIGHT HAPPEN: 0-NOT AT ALL
1. FEELING NERVOUS, ANXIOUS, OR ON EDGE: 2

## 2022-10-04 ASSESSMENT — PATIENT HEALTH QUESTIONNAIRE - PHQ9
9. THOUGHTS THAT YOU WOULD BE BETTER OFF DEAD, OR OF HURTING YOURSELF: 0
7. TROUBLE CONCENTRATING ON THINGS, SUCH AS READING THE NEWSPAPER OR WATCHING TELEVISION: 2
4. FEELING TIRED OR HAVING LITTLE ENERGY: 3
SUM OF ALL RESPONSES TO PHQ QUESTIONS 1-9: 14
6. FEELING BAD ABOUT YOURSELF - OR THAT YOU ARE A FAILURE OR HAVE LET YOURSELF OR YOUR FAMILY DOWN: 1
3. TROUBLE FALLING OR STAYING ASLEEP: 2
SUM OF ALL RESPONSES TO PHQ QUESTIONS 1-9: 14
8. MOVING OR SPEAKING SO SLOWLY THAT OTHER PEOPLE COULD HAVE NOTICED. OR THE OPPOSITE, BEING SO FIGETY OR RESTLESS THAT YOU HAVE BEEN MOVING AROUND A LOT MORE THAN USUAL: 2
2. FEELING DOWN, DEPRESSED OR HOPELESS: 2
1. LITTLE INTEREST OR PLEASURE IN DOING THINGS: 2
10. IF YOU CHECKED OFF ANY PROBLEMS, HOW DIFFICULT HAVE THESE PROBLEMS MADE IT FOR YOU TO DO YOUR WORK, TAKE CARE OF THINGS AT HOME, OR GET ALONG WITH OTHER PEOPLE: 1
SUM OF ALL RESPONSES TO PHQ9 QUESTIONS 1 & 2: 4
5. POOR APPETITE OR OVEREATING: 0

## 2022-10-04 NOTE — TELEPHONE ENCOUNTER
Dr Marvin has opening this afternoon. Please call to schedule    Please document call and then close encounter.   thanks

## 2022-10-04 NOTE — PROGRESS NOTES
[address] [facility]                                           [address], [address], [address] [address]        Phone: [phone]                                     NAME:  Deidra Larry  :    1975      CONSULTANTS  Patient Care Team:  Palmer Denise MD as PCP - General (Family Medicine)  Annemarie Andersen as PCP - OBPARTH Denise MD as PCP - [facility]  [facility]  Deidra Larry is a 52 y.o. female presenting as an established patient for acute, chronic, and personalized prevention plan services as noted below. HISTORY OF PRESENT ILLNESS    1. Patellofemoral pain syndrome of left knee  - [place] out to [place]  - hiked for about 6 hours total every day from Wed to Sun  - flew back to [address] on Mon (yesterday)  - started experiencing pain yesterday  - pain described as aching and dull localized to anterior and posterior aspect of L knee  - pain aggravated to 7/10 when walking or carrying heavy items  - pain relieved to 5/10 when resting  - L knee \"gives out\" from time to time and hears \"popping\" or \"clicking\" sound  - no recent or remote hx of injury or trauma  - no prior surgery on either knee, has had R hip replaced last year  - physically active and hikes regularly, the activity last week was not out of the ordinary for her  - no f/c/n/v/cp/sob, no rashes, no other joint pain  - hx of OA of hip and lower back but unknown OA status of the knees    2. Need for prophylactic vaccination against Streptococcus pneumoniae (pneumococcus)  - wants pneumococcal vaccine    3. Healthcare maintenance  - wants flu vaccine  - needs albuterol refill      PAST MEDICAL HISTORY  Past Medical History:   Diagnosis Date    ADHD     Anxiety     Extrinsic asthma without complication     Kidney stone     MDD (major depressive disorder)     has been on zoloft, paxil, celexa. (had severe HAs, and nausea on wellbutrin)    Seasonal allergies        PAST SURGICAL HISTORY  Past Surgical History:   Procedure Laterality Date    ABDOMINAL EXPLORATION SURGERY      BREAST ENHANCEMENT SURGERY      TOTAL HIP ARTHROPLASTY  12/2021    TUBAL LIGATION         HOME MEDS  Prior to Visit Medications    Medication Sig Taking? Authorizing Provider   albuterol sulfate HFA (VENTOLIN HFA) 108 (90 Base) MCG/ACT inhaler Inhale 2 puffs into the lungs 4 times daily as needed for Wheezing Yes Sony Narayanan MD PhD   diclofenac sodium (VOLTAREN) 1 % GEL Apply 4 g topically 4 times daily Yes Sony Narayanan MD PhD   montelukast (SINGULAIR) 10 MG tablet TAKE 1 TABLET BY MOUTH EVERY NIGHT Yes Pepito Lane MD   amphetamine-dextroamphetamine (ADDERALL XR) 15 MG extended release capsule Take 1 capsule by mouth daily for 30 days.  Yes Pepito Lane MD   citalopram (CELEXA) 40 MG tablet Take 1 tablet by mouth daily Yes Pepito Lane MD   cyanocobalamin 1000 MCG tablet Take 1 tablet by mouth daily  Yes Historical Provider, MD   Spacer/Aero-Holding Chambers ZHENG 1 Device by Does not apply route daily as needed (SOB) Yes Pepito Lane MD   Cetirizine HCl (ZYRTEC PO) Take by mouth Yes Historical Provider, MD   diphenhydrAMINE (BENADRYL) 25 MG capsule Take 25 mg by mouth every 6 hours as needed for Itching  Yes Historical Provider, MD   doxycycline hyclate (VIBRA-TABS) 100 MG tablet Take 100 mg by mouth 2 times daily  Patient not taking: Reported on 10/4/2022  Historical Provider, MD   triamcinolone (KENALOG) 0.1 % cream APPLY TOPICALLY TO THE AFFECTED AREA THREE TIMES DAILY FOR 7 DAYS  Patient not taking: Reported on 10/4/2022  Historical Provider, MD       ALLERGIES  Trazodone, Pcn [penicillins], Cephalosporins, Gluten meal, Other, and Wellbutrin [bupropion]      FAMILY HISTORY      Problem Relation Age of Onset    Colon Cancer Mother     Diabetes Father     Diabetes Maternal Aunt     Diabetes Maternal Grandmother     Heart Disease Paternal Grandmother     Diabetes Maternal Aunt     Diabetes Maternal Aunt     Diabetes Maternal Aunt     Diabetes Maternal Aunt     Diabetes Maternal Aunt        SOCIAL HISTORY  Social History       Tobacco History       Smoking Status  Never      Smokeless Tobacco Use  Never              Alcohol History       Alcohol Use Status  Yes Drinks/Week  0 Standard drinks or equivalent per week Amount  0.0 standard drinks of alcohol/wk Comment  socially (a couple glasses on the week)              Drug Use       Drug Use Status  No              Sexual Activity       Sexually Active  Yes Partners  Male Comment  , 2 children                    [address] Maintenance   Topic Date Due    HIV screen  Never done    Hepatitis C screen  Never done    Depression Monitoring  04/28/2023    Lipids  09/10/2023    Colorectal Cancer Screen  11/08/2024    DTaP/Tdap/Td vaccine (3 - Td or Tdap) 08/31/2025    Cervical cancer screen  06/07/2027    Flu vaccine  Completed    Pneumococcal 0-64 years Vaccine  Completed    COVID-19 Vaccine  Completed    Hepatitis A vaccine  Aged Out    Hepatitis B vaccine  Aged Out    Hib vaccine  Aged Out    Meningococcal (ACWY) vaccine  Aged Out     Immunization History   Administered Date(s) Administered    COVID-19, MODERNA BLUE border, Primary or Immunocompromised, (age 12y+), IM, 100 mcg/0.5mL 03/16/2021, 04/13/2021, 12/24/2021    COVID-19, MODERNA Booster BLUE border, (age 18y+), IM, 50mcg/0.25mL 12/24/2021    Influenza Vaccine, unspecified formulation 10/10/2016    Influenza Virus Vaccine 10/17/2013    Influenza, AFLURIA (age 1 yrs+), FLUZONE, (age 10 mo+), MDV, 0.5mL 11/30/2017    Influenza, FLUARIX, FLULAVAL, FLUZONE (age 10 mo+) AND AFLURIA, (age 1 y+), PF, 0.5mL 12/03/2018    Influenza, FLUBLOK, (age 25 y+), PF, 0.5mL 10/07/2020    Influenza, FLUCELVAX, (age 10 mo+), MDCK, MDV, 0.5mL 09/30/2021    Influenza, FLUCELVAX, (age 10 mo+), MDCK, PF, 0.5mL 10/04/2022    Pneumococcal conjugate PCV20, PF (Prevnar 20) 10/04/2022    Td, unspecified formulation 10/22/1991    Tdap (Boostrix, Adacel) 08/31/2015       REVIEW OF SYSTEMS  Relevant RoS were stated in the HPI      PHYSICAL EXAM  Vitals:    10/04/22 1442   BP: 124/82   Site: Left Upper Arm   Position: Sitting   Cuff Size: Small Adult   Pulse: 82   Temp: 97.2 °F (36.2 °C)   TempSrc: Temporal   SpO2: 98%   Weight: 214 lb 6.4 oz (97.3 kg)     Body mass index is 34.61 kg/m². Wt Readings from Last 3 Encounters:   10/04/22 214 lb 6.4 oz (97.3 kg)   04/28/22 209 lb (94.8 kg)   03/25/22 208 lb 9.6 oz (94.6 kg)     BP Readings from Last 3 Encounters:   10/04/22 124/82   04/28/22 110/72   03/25/22 118/82     Physical Exam  Constitutional:       Appearance: She is obese. HENT:      Head: Normocephalic and atraumatic. Musculoskeletal:         General: No swelling, tenderness, deformity or signs of injury. Right lower leg: No edema. Left lower leg: No edema. Skin:     General: Skin is warm and dry. Coloration: Skin is not jaundiced or pale. Findings: No bruising, erythema, lesion or rash. Neurological:      Mental Status: She is alert. L knee: no effusion, no erythema, nontender to palp  Lachman's test: negative  Anterior drawer: negative  Posterior drawer: negative  Valgus test: negative  Varus test: negative  Trudi's test: inconclusive  Patellar Grind Test: Positive      LAB REVIEW  No visits with results within 6 Month(s) from this visit.    Latest known visit with results is:   Office Visit on 11/08/2021   Component Date Value    Amphetamine Screen, Urine 11/08/2021 Neg     Barbiturate Screen, Ur 11/08/2021 Neg     Benzodiazepine Screen, U* 11/08/2021 Neg     Cannabinoid Scrn, Ur 11/08/2021 Neg     Cocaine Metabolite Scree* 11/08/2021 Neg     Opiate Scrn, Ur 11/08/2021 Neg     PCP Screen, Urine 11/08/2021 Neg     Methadone Screen, Urine 11/08/2021 Neg     Propoxyphene Scrn, Ur 11/08/2021 Neg     Oxycodone Urine 11/08/2021 Neg     pH, UA 11/08/2021 8.0     Drug Screen Comment: 11/08/2021 see below      Discussed the relevant lab results with the patient. EMILIE Leigh is a 52 y.o. female presenting as an established patient for acute, chronic, and personalized prevention plan services as noted below. The plans listed below are in accordance with the latest evidence-based practice guidelines from societies including but not limited to USPSTF, AAFP, ADA, DELBERT/AHA, MARLINE, and many others. If the plans deviate from the guidelines or from the latest evidence of practice, they are done so with shared discussions with the patient and by weighing the pros and cons of each individual case. PLAN    1. 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 Need for prophylactic vaccination against Streptococcus pneumoniae (pneumococcus)  - pneumococcal vaccine today    3. Healthcare maintenance  - influenza vaccine today  - albuterol sulfate inhaler refill      HEALTH MAINTENANCE DUE  Health Maintenance Due   Topic Date Due    HIV screen  Never done    Hepatitis C screen  Never done         RETURN TO [facility]  Return in about 4 weeks (around 11/1/2022), or if symptoms worsen or fail to improve - L knee pain (PFP syndrome). EMR Dragon/transcription disclaimer:  Much of this encounter note is electronic transcription/translation of spoken language to printed texts. The electronic translation of spoken language may be erroneous, or at times, nonsensical words or phrases may be inadvertently transcribed.   Although I have reviewed the note for such errors, some may still exist.

## 2022-10-04 NOTE — TELEPHONE ENCOUNTER
Received call from [facility] with Red Flag Complaint. Subjective: Caller states \"was hiking and injured left knee. It is making popping sounds in the front and back of knee\"   No swelling    Current Symptoms:     Onset: 1 day ago;     Associated Symptoms:     Pain Severity: 6/10; sharp, dull, aching, tender; constant    Temperature:      What has been tried: Ibuprofen    LMP: NA Pregnant: NA    Recommended disposition: See in Office Today    Care advice provided, patient verbalizes understanding; denies any other questions or concerns; instructed to call back for any new or worsening symptoms. Patient/Caller agrees with recommended disposition; writer provided warm transfer to [place] [address] at [facility] for appointment scheduling    Attention Provider: Thank you for allowing me to participate in the care of your patient. The patient was connected to triage in response to information provided to the ECC/PSC. Please do not respond through this encounter as the response is not directed to a shared pool.         Reason for Disposition   Patient wants to be seen    Protocols used: Knee Injury-ADULT-[place]

## 2022-10-06 ENCOUNTER — TELEPHONE (OUTPATIENT)
Dept: PRIMARY CARE CLINIC | Age: 47
End: 2022-10-06

## 2022-10-06 DIAGNOSIS — M22.42 CHONDROMALACIA OF LEFT PATELLOFEMORAL JOINT: Primary | ICD-10-CM

## 2022-10-06 NOTE — TELEPHONE ENCOUNTER
Pt received a call from [facility] PT and they are not covered by the [facility] [employer].      Pt needs PT orders faxed to:      Margo Blanc is covered by her insurance  Fax: [phone]

## 2022-10-06 NOTE — TELEPHONE ENCOUNTER
Pt received a call from [address] [place] [address] PT and they are not covered by the [employer].     Pt needs PT orders faxed to:     Rocco Mineshannabella is covered by her insurance  Fax: [phone]

## 2022-10-09 NOTE — TELEPHONE ENCOUNTER
Referral placed    Ok to fax. Inform pt once done. Please document call and then close encounter.   thanks

## 2022-11-03 ENCOUNTER — OFFICE VISIT (OUTPATIENT)
Dept: PRIMARY CARE CLINIC | Age: 47
End: 2022-11-03
Payer: COMMERCIAL

## 2022-11-03 ENCOUNTER — HOSPITAL ENCOUNTER (OUTPATIENT)
Age: 47
Setting detail: SPECIMEN
Discharge: HOME | End: 2022-11-03
Payer: COMMERCIAL

## 2022-11-03 VITALS
BODY MASS INDEX: 34.23 KG/M2 | DIASTOLIC BLOOD PRESSURE: 78 MMHG | HEART RATE: 87 BPM | SYSTOLIC BLOOD PRESSURE: 116 MMHG | WEIGHT: 213 LBS | HEIGHT: 66 IN | TEMPERATURE: 97.2 F | OXYGEN SATURATION: 99 %

## 2022-11-03 DIAGNOSIS — F90.2 ATTENTION DEFICIT HYPERACTIVITY DISORDER (ADHD), COMBINED TYPE: ICD-10-CM

## 2022-11-03 DIAGNOSIS — Z12.31 ENCOUNTER FOR SCREENING MAMMOGRAM FOR MALIGNANT NEOPLASM OF BREAST: ICD-10-CM

## 2022-11-03 DIAGNOSIS — F90.2 ATTENTION DEFICIT HYPERACTIVITY DISORDER (ADHD), COMBINED TYPE: Primary | ICD-10-CM

## 2022-11-03 DIAGNOSIS — F41.9 ANXIETY: ICD-10-CM

## 2022-11-03 DIAGNOSIS — J45.20 MILD INTERMITTENT EXTRINSIC ASTHMA WITHOUT COMPLICATION: ICD-10-CM

## 2022-11-03 PROCEDURE — 99214 OFFICE O/P EST MOD 30 MIN: CPT | Performed by: FAMILY MEDICINE

## 2022-11-03 PROCEDURE — 80307 DRUG TEST PRSMV CHEM ANLYZR: CPT

## 2022-11-03 RX ORDER — DEXTROAMPHETAMINE SACCHARATE, AMPHETAMINE ASPARTATE MONOHYDRATE, DEXTROAMPHETAMINE SULFATE AND AMPHETAMINE SULFATE 3.75; 3.75; 3.75; 3.75 MG/1; MG/1; MG/1; MG/1
15 CAPSULE, EXTENDED RELEASE ORAL DAILY
Qty: 30 CAPSULE | Refills: 0 | Status: SHIPPED | OUTPATIENT
Start: 2022-11-03 | End: 2022-12-03

## 2022-11-03 ASSESSMENT — ANXIETY QUESTIONNAIRES
IF YOU CHECKED OFF ANY PROBLEMS ON THIS QUESTIONNAIRE, HOW DIFFICULT HAVE THESE PROBLEMS MADE IT FOR YOU TO DO YOUR WORK, TAKE CARE OF THINGS AT HOME, OR GET ALONG WITH OTHER PEOPLE: VERY DIFFICULT
7. FEELING AFRAID AS IF SOMETHING AWFUL MIGHT HAPPEN: 0
1. FEELING NERVOUS, ANXIOUS, OR ON EDGE: 1
3. WORRYING TOO MUCH ABOUT DIFFERENT THINGS: 1
5. BEING SO RESTLESS THAT IT IS HARD TO SIT STILL: 2
2. NOT BEING ABLE TO STOP OR CONTROL WORRYING: 2
4. TROUBLE RELAXING: 3
GAD7 TOTAL SCORE: 9
6. BECOMING EASILY ANNOYED OR IRRITABLE: 0

## 2022-11-03 ASSESSMENT — PATIENT HEALTH QUESTIONNAIRE - PHQ9
SUM OF ALL RESPONSES TO PHQ QUESTIONS 1-9: 14
2. FEELING DOWN, DEPRESSED OR HOPELESS: 3
10. IF YOU CHECKED OFF ANY PROBLEMS, HOW DIFFICULT HAVE THESE PROBLEMS MADE IT FOR YOU TO DO YOUR WORK, TAKE CARE OF THINGS AT HOME, OR GET ALONG WITH OTHER PEOPLE: 2
1. LITTLE INTEREST OR PLEASURE IN DOING THINGS: 3
3. TROUBLE FALLING OR STAYING ASLEEP: 2
6. FEELING BAD ABOUT YOURSELF - OR THAT YOU ARE A FAILURE OR HAVE LET YOURSELF OR YOUR FAMILY DOWN: 2
8. MOVING OR SPEAKING SO SLOWLY THAT OTHER PEOPLE COULD HAVE NOTICED. OR THE OPPOSITE, BEING SO FIGETY OR RESTLESS THAT YOU HAVE BEEN MOVING AROUND A LOT MORE THAN USUAL: 0
SUM OF ALL RESPONSES TO PHQ QUESTIONS 1-9: 14
7. TROUBLE CONCENTRATING ON THINGS, SUCH AS READING THE NEWSPAPER OR WATCHING TELEVISION: 2
SUM OF ALL RESPONSES TO PHQ QUESTIONS 1-9: 14
SUM OF ALL RESPONSES TO PHQ9 QUESTIONS 1 & 2: 6
5. POOR APPETITE OR OVEREATING: 0
9. THOUGHTS THAT YOU WOULD BE BETTER OFF DEAD, OR OF HURTING YOURSELF: 0
SUM OF ALL RESPONSES TO PHQ QUESTIONS 1-9: 14
4. FEELING TIRED OR HAVING LITTLE ENERGY: 2

## 2022-11-03 NOTE — LETTER
CONTROLLED SUBSTANCE MEDICATION AGREEMENT     Patient Name: Jammie Smith  Patient YOB: 1975   I understand, that controlled substance medications may be used to help better manage my symptoms and to improve my ability to function at home, work and in social settings. However, I also understand that these medications do have risks, which have been discussed with me, including possible development of physical or psychological dependence. I understand that successful treatment requires mutual trust and honesty between me and my provider. I understand and agree that following this Medication Agreement is necessary in continuing my provider-patient relationship and the success of my treatment plan. Explanation from my Provider: Benefits and Goals of Controlled Substance Medications: There are two potential goals for your treatment: (1) decreased pain and suffering (2) improved daily life functions. There are many possible treatments for your chronic condition(s). Alternatives such as physical therapy, yoga, massage, home daily exercise, meditation, relaxation techniques, injections, chiropractic manipulations, surgery, cognitive therapy, hypnosis and many medications that are not habit-forming may be used. Use of controlled substance medications may be helpful, but they are unlikely to resolve all symptoms or restore all function. Explanation from my Provider: Risks of Controlled Substance Medications:  Opioid pain medications: These medications can lead to problems such as addiction/dependence, sedation, lightheadedness/dizziness, memory issues, falls, constipation, nausea, or vomiting. They may also impair the ability to drive or operate machinery. Additionally, these medications may lower testosterone levels, leading to loss of bone strength, stamina and sex drive.   They may cause problems with breathing, sleep apnea and reduced coughing, which is especially dangerous for patients with lung disease. Overdose or dangerous interactions with alcohol and other medications may occur, leading to death. Hyperalgesia may develop, which means patients receiving opioids for the treatment of pain may become more sensitive to certain painful stimuli, and in some cases, experience pain from ordinarily non-painful stimuli. Women between the ages of 14-53 who could become pregnant should carefully weigh the risks and benefits of opioids with their physicians, as these medications increase the risk of pregnancy complications, including miscarriage,  delivery and stillbirth. It is also possible for babies to be born addicted to opioids. Opioid dependence withdrawal symptoms may include; feelings of uneasiness, increased pain, irritability, belly pain, diarrhea, sweats and goose-flesh. Benzodiazepines and non-benzodiazepine sleep medications: These medications can lead to problems such as addiction/dependence, sedation, fatigue, lightheadedness, dizziness, incoordination, falls, depression, hallucinations, and impaired judgment, memory and concentration. The ability to drive and operate machinery may also be affected. Abnormal sleep-related behaviors have been reported, including sleepwalking, driving, making telephone calls, eating, or having sex while not fully awake. These medications can suppress breathing and worsen sleep apnea, particularly when combined with alcohol or other sedating medications, potentially leading to death. Dependence withdrawal symptoms may include tremors, anxiety, hallucinations and seizures. Stimulants:  Common adverse effects include addiction/dependence, increased blood  pressure and heart rate, decreased appetite, nausea, involuntary weight loss, insomnia,                                                                                                                     Initials:_______   irritability, and headaches.   These risks may increase when these medications are combined with other stimulants, such as caffeine pills or energy drinks, certain weight loss supplements and oral decongestants. Dependence withdrawal symptoms may include depressed mood, loss of interest, suicidal thoughts, anxiety, fatigue, appetite changes and agitation. Testosterone replacement therapy:  Potential side effects include increased risk of stroke and heart attack, blood clots, increased blood pressure, increased cholesterol, enlarged prostate, sleep apnea, irritability/aggression and other mood disorders, and decreased fertility. I agree and understand that I and my prescriber have the following rights and responsibilities regarding my treatment plan:     1. MY RIGHTS:  To be informed of my treatment and medication plan. To be an active participant in my health and wellbeing. 2. MY RESPONSIBILITY AND UNDERSTANDING FOR USE OF MEDICATIONS   I will take medications at the dose and frequency as directed. For my safety, I will not increase or change how I take my medications without the recommendation of my healthcare provider.  I will actively participate in any program recommended by my provider which may improve function, including social, physical, psychological programs.  I will not take my medications with alcohol or other drugs not prescribed to me. I understand that drinking alcohol with my medications increases the chances of side effects, including reduced breathing rate and could lead to personal injury when operating machinery.  I understand that if I have a history of substance use disorders, including alcohol or other illicit drugs, that I may be at increased risk of addiction to my medications.  I agree to notify my provider immediately if I should become pregnant so that my treatment plan can be adjusted.    I agree and understand that I shall only receive controlled substance medications from the prescriber that signed this agreement unless there is written agreement among other prescribers of controlled substances outlining the responsibility of the medications being prescribed.  I understand that the if the controlled medication is not helping to achieve goals, the dosage may be tapered and no longer prescribed. 3. MY RESPONSIBILITY FOR COMMUNICATION / PRESCRIPTION RENEWALS   I agree that all controlled substance medications that I take will be prescribed only by my provider. If another healthcare provider prescribes me medication in an emergency, I will notify my provider within seventy-two (72) hours.  I will arrange for refills at the prescribed interval ONLY during regular office hours. I will not ask for refills earlier than agreed, after-hours, on holidays or weekends. Refills may take up to 72 hours for processing and prescriptions to reach the pharmacy.  I will inform my other health care providers that I am taking these medications and of the existence of this [address]. In the event of an emergency, I will provide the same information to the emergency department prescribers.  I will keep my provider updated on the pharmacy I am using for controlled medication prescription filling. Initials:_______  4. MY RESPONSIBILITY FOR PROTECTING MEDICATIONS   I will protect my prescriptions and medications. I understand that lost or misplaced prescriptions will not be replaced.  I will keep medications only for my own use and will not share them with others. I will keep all medications away from children.  I agree that if my medications are adjusted or discontinued, I will properly dispose of any remaining medications. I understand that I will be required to dispose of any remaining controlled medications as, directed by my prescriber, prior to being provided with any prescriptions for other controlled medications.   Medication drop box locations can be found at: [employer]    5. MY RESPONSIBILITY WITH ILLEGAL DRUGS    I will not use illegal or street drugs or another person's prescription medications not prescribed to me.  If there are identified addiction type symptoms, then referral to a program may be provided by my provider and I agree to follow through with this recommendation. 6. MY RESPONSIBILITY FOR COOPERATION WITH INVESTIGATIONS   I understand that my provider will comply with any applicable law and may discuss my use and/or possible misuse/abuse of controlled substances and alcohol, as appropriate, with any health care provider involved in my care, pharmacist, or legal authority.  I authorize my provider and pharmacy to cooperate fully with law enforcement agencies (as permitted by law) in the investigation of any possible misuse, sale, or other diversion of my controlled substances.  I agree to waive any applicable privilege or right of privacy or confidentiality with respect to these authorizations. 7. PROVIDERS RIGHT TO MONITOR FOR SAFETY: PRESCRIPTION MONITORING / DRUG TESTING   I consent to drug/toxicology screening and will submit to a drug screen upon my providers request to assure I am only taking the prescribed drugs for my safety monitoring. I understand that a drug screen is a laboratory test in which a sample of my urine, blood or saliva is checked to see what drugs I have been taking. This may entail an observed urine specimen, which means that a nurse or other health care provider may watch me provide urine, and I will cooperate if I am asked to provide an observed specimen.  I understand that my provider will check a copy of my State Prescription Monitoring Program () Report in order to safely prescribe medications.  Pill Counts: I consent to pill counts when requested.   I may be asked to bring all my prescribed controlled substance medications, in their original bottles, to all of my scheduled appointments. In addition, my provider may ask me to come to the practice at any time for a random pill count. 8. TERMINATION OF THIS AGREEMENT  For my safety, my prescriber has the right to stop prescribing controlled substance medications and may end this agreement. Initials:_______   Conditions that may result in termination of this agreement:  a. I do not show any improvement in pain, or my activity has not improved. b. I develop rapid tolerance or loss of improvement, as described in my treatment plan.  c. I develop significant side effects from the medication. d. My behavior is not consistent with the responsibilities outlined above, thereby causing safety concerns to continue prescribing controlled substance medications. e. I fail to follow the terms of this agreement. f. Other:____________________________       UNDERSTANDING THIS MEDICATION AGREEMENT:    I have read the above and have had all my questions answered. For chronic disease management, I know that my symptoms can be managed with many types of treatments. A chronic medication trial may be part of my treatment, but I must be an active participant in my care. Medication therapy is only one part of my symptom management plan. In some cases, there may be limited scientific evidence to support the chronic use of certain medications to improve symptoms and daily function. Furthermore, in certain circumstances, there may be scientific information that suggests that the use of chronic controlled substances may worsen my symptoms and increase my risk of unintentional death directly related to this medication therapy. I know that if my provider feels my risk from controlled medications is greater than my benefit, I will have my controlled substance medication(s) compassionately lowered or removed altogether.      I further agree to allow this office to contact my HIPAA contact if there are concerns about my safety and use of the controlled medications. I have agreed to use the prescribed controlled substance medications to me as instructed by my provider and as stated in this Medication Agreement. My initial on each page and my signature below shows that I have read each page and I have had the opportunity to ask questions with answers provided by my provider.     Patient Name (Printed): _____________________________________  Patient Signature:  ______________________   Date: _____________    Prescriber Name (Printed): ___________________________________  Prescriber Signature: _____________________  Date: _____________

## 2022-11-03 NOTE — PROGRESS NOTES
PROGRESS NOTE     Charley Stinson MD  [address], [address], [address] [facility] [place] [address]         Phone: [phone]    Date of Service:  11/3/2022     Patient ID: Dl Pereyra is a 52 y.o. female      Assessment / Plan:       ADHD: not controlled. Now that starting work again, restarting adderall 15mg XR. UDS collected today, and med contract signed and scanned. Will f/u in 2 months for re-evaluation. Controlled Substance Monitoring:    Acute and Chronic Pain Monitoring:   RX Monitoring 11/3/2022   Attestation -   Periodic Controlled Substance Monitoring No signs of potential drug abuse or diversion identified. ;Possible medication side effects, risk of tolerance/dependence & alternative treatments discussed. ;Assessed functional status. Anxiety:  Controlled celexa 40mg. Allergy induced asthma:  not sure if controlled with singulair. Still uses albuterol 2-3 times a week. Not sure if its helping. Will evaluate with PFTs with bronchial challenge. Patient told to let me know if has any shortness of breath, chest tightness, dyspnea on exertion in the meantime. HM:  - Advised patient to get screening labs (A1C, Lipids, HIV and Hep C) as soon as possible   - Mammogram order placed, patient will scheduled  - Pap smear + HPV co-testing both neg 6/7/22  - last colonoscopy was 11/8/19:  next due 2024 (family h/o colon cancer in mother)    Subjective:     CC: ADHD, Anxiety, Allergy induced asthma    HPI    60-year-old white female presenting for follow-up of the above chronic medical conditions. Patient states earlier this year her anxiety became severe. She had a major panic attack at work. On that day, her and her  decided it would be okay for her to quit this job as it was causing too much stress. She remained off work until a month ago when she started a new job. 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 She loves the job and does not find it stressful. She feels that this career change has been the best thing for her mental health. She still compliant with Celexa 40 mg daily, denies any side effects, and wishes to continue. Over the summer she stopped taking her Adderall since she was not working. She did not feel that she needed it, but looking back realizes she has trouble staying on top of housework even when she does not have a job. At work she does a lot of tasks that require sustained attention, such as kalani writing. She would like to restart her ADHD medicine so she can more easily stay organized and on top of her work. She was last on Adderall 15 mg XR. Denied any side effects while on the medicine, and feels like it was effective. Patient compliant with Singulair for allergy induced asthma. Since season change, she feels some slight SOB takes albuterol for this. She feels she is needing albuterol at least 3 times a week. She is not sure how well it is helping her symptoms. She denies any rhinitis, nasal congestion, sinus pain or pressure, postnasal drip, itchy throat or nose. No chest pain.         ROS:  See hpi    Vitals:    11/03/22 1228   BP: 116/78   Site: Left Upper Arm   Position: Sitting   Cuff Size: Medium Adult   Pulse: 87   Temp: 97.2 °F (36.2 °C)   TempSrc: Temporal   SpO2: 99%   Weight: 213 lb (96.6 kg)   Height: 5' 6\" (1.676 m)       Outpatient Medications Marked as Taking for the 11/3/22 encounter (Office Visit) with Vaishnavi Mccullough MD   Medication Sig Dispense Refill    albuterol sulfate HFA (VENTOLIN HFA) 108 (90 Base) MCG/ACT inhaler Inhale 2 puffs into the lungs 4 times daily as needed for Wheezing 1 each 2    montelukast (SINGULAIR) 10 MG tablet TAKE 1 TABLET BY MOUTH EVERY NIGHT 90 tablet 1    citalopram (CELEXA) 40 MG tablet Take 1 tablet by mouth daily 90 tablet 1    Cetirizine HCl (ZYRTEC PO) Take by mouth               Objective:   Constitutional:   Reviewed vitals above  Well Nourished, well developed, no distress       HENT:  Normal external nose without lesion  Normal oropharynx without erythema or exudate  Normal nasal mucosa without swelling or erythema  Neck:  Symmetric and without masses  No thyromegaly  Resp:  Normal effort  Clear to auscultation bilaterally without rhonchi, wheezing or crackles  Cardiovascular: On auscultation, normal S1 and S2 without murmurs, rubs or gallops  No bruits of bilateral carotids and no JVD  Gastrointestinal:  Nontender, nondistended, and no masses  No hepatosplenomegaly  Musculoskeletal:  Normal Gait  All extremities without clubbing, cyanosis or edema  Skin:  No rashes on inspection  No areas of increased heat or induration on palpation  Psych:  Normal mood and affect  Normal insight and judgement        EMR Dragon/transcription disclaimer:  Much of this encounter note is electronic transcription/translation of spoken language to printed texts. The electronic translation of spoken language may be erroneous, or at times, nonsensical words or phrases may be inadvertently transcribed.   Although I have reviewed the note for such errors, some may still exist.

## 2022-11-03 NOTE — PATIENT INSTRUCTIONS
Call central scheduling for mammogram and PFTs with bronchial challenge:   [phone]     Get fasting labs

## 2022-11-07 LAB
6-ACETYLMORPHINE: NOT DETECTED
7-AMINOCLONAZEPAM: NOT DETECTED
ALPHA-OH-ALPRAZOLAM: NOT DETECTED
ALPHA-OH-MIDAZOLAM, URINE: NOT DETECTED
ALPRAZOLAM: NOT DETECTED
AMPHETAMINE: NOT DETECTED
BARBITURATES: NOT DETECTED
BENZOYLECGONINE: NOT DETECTED
BUPRENORPHINE: NOT DETECTED
CARISOPRODOL: NOT DETECTED
CLONAZEPAM: NOT DETECTED
CODEINE: NOT DETECTED
CREATININE URINE: 74.8 MG/DL (ref 20–400)
DIAZEPAM: NOT DETECTED
DRUGS EXPECTED: NORMAL
EER PAIN MGT DRUG PANEL, HIGH RES/EMIT U: NORMAL
ETHYL GLUCURONIDE: NOT DETECTED
FENTANYL: NOT DETECTED
GABAPENTIN: NOT DETECTED
HYDROCODONE: NOT DETECTED
HYDROMORPHONE: NOT DETECTED
LORAZEPAM: NOT DETECTED
MARIJUANA METABOLITE: NOT DETECTED
MDA: NOT DETECTED
MDEA: NOT DETECTED
MDMA URINE: NOT DETECTED
MEPERIDINE: NOT DETECTED
METHADONE: NOT DETECTED
METHAMPHETAMINE: NOT DETECTED
METHYLPHENIDATE: NOT DETECTED
MIDAZOLAM: NOT DETECTED
MORPHINE: NOT DETECTED
NALOXONE: NOT DETECTED
NORBUPRENORPHINE, FREE: NOT DETECTED
NORDIAZEPAM: NOT DETECTED
NORFENTANYL: NOT DETECTED
NORHYDROCODONE, URINE: NOT DETECTED
NOROXYCODONE: NOT DETECTED
NOROXYMORPHONE, URINE: NOT DETECTED
OXAZEPAM: NOT DETECTED
OXYCODONE: NOT DETECTED
OXYMORPHONE: NOT DETECTED
PAIN MANAGEMENT DRUG PANEL: NORMAL
PAIN MANAGEMENT DRUG PANEL: NORMAL
PCP: NOT DETECTED
PHENTERMINE: NOT DETECTED
PREGABALIN: NOT DETECTED
TAPENTADOL, URINE: NOT DETECTED
TAPENTADOL-O-SULFATE, URINE: NOT DETECTED
TEMAZEPAM: NOT DETECTED
TRAMADOL: NOT DETECTED
ZOLPIDEM: NOT DETECTED

## 2022-11-29 ENCOUNTER — OFFICE VISIT (OUTPATIENT)
Dept: PRIMARY CARE CLINIC | Age: 47
End: 2022-11-29

## 2022-11-29 VITALS
TEMPERATURE: 98.3 F | HEIGHT: 66 IN | OXYGEN SATURATION: 99 % | BODY MASS INDEX: 34.39 KG/M2 | DIASTOLIC BLOOD PRESSURE: 80 MMHG | SYSTOLIC BLOOD PRESSURE: 122 MMHG | HEART RATE: 88 BPM | WEIGHT: 214 LBS

## 2022-11-29 DIAGNOSIS — J06.9 VIRAL UPPER RESPIRATORY TRACT INFECTION: Primary | ICD-10-CM

## 2022-11-29 SDOH — ECONOMIC STABILITY: FOOD INSECURITY: WITHIN THE PAST 12 MONTHS, THE FOOD YOU BOUGHT JUST DIDN'T LAST AND YOU DIDN'T HAVE MONEY TO GET MORE.: NEVER TRUE

## 2022-11-29 SDOH — ECONOMIC STABILITY: FOOD INSECURITY: WITHIN THE PAST 12 MONTHS, YOU WORRIED THAT YOUR FOOD WOULD RUN OUT BEFORE YOU GOT MONEY TO BUY MORE.: NEVER TRUE

## 2022-11-29 ASSESSMENT — PATIENT HEALTH QUESTIONNAIRE - PHQ9
SUM OF ALL RESPONSES TO PHQ QUESTIONS 1-9: 14
6. FEELING BAD ABOUT YOURSELF - OR THAT YOU ARE A FAILURE OR HAVE LET YOURSELF OR YOUR FAMILY DOWN: 2
2. FEELING DOWN, DEPRESSED OR HOPELESS: 2
3. TROUBLE FALLING OR STAYING ASLEEP: 2
SUM OF ALL RESPONSES TO PHQ QUESTIONS 1-9: 14
SUM OF ALL RESPONSES TO PHQ9 QUESTIONS 1 & 2: 4
8. MOVING OR SPEAKING SO SLOWLY THAT OTHER PEOPLE COULD HAVE NOTICED. OR THE OPPOSITE, BEING SO FIGETY OR RESTLESS THAT YOU HAVE BEEN MOVING AROUND A LOT MORE THAN USUAL: 0
1. LITTLE INTEREST OR PLEASURE IN DOING THINGS: 2
7. TROUBLE CONCENTRATING ON THINGS, SUCH AS READING THE NEWSPAPER OR WATCHING TELEVISION: 2
SUM OF ALL RESPONSES TO PHQ QUESTIONS 1-9: 14
10. IF YOU CHECKED OFF ANY PROBLEMS, HOW DIFFICULT HAVE THESE PROBLEMS MADE IT FOR YOU TO DO YOUR WORK, TAKE CARE OF THINGS AT HOME, OR GET ALONG WITH OTHER PEOPLE: 1
5. POOR APPETITE OR OVEREATING: 2
4. FEELING TIRED OR HAVING LITTLE ENERGY: 2
SUM OF ALL RESPONSES TO PHQ QUESTIONS 1-9: 14
9. THOUGHTS THAT YOU WOULD BE BETTER OFF DEAD, OR OF HURTING YOURSELF: 0

## 2022-11-29 ASSESSMENT — ANXIETY QUESTIONNAIRES
5. BEING SO RESTLESS THAT IT IS HARD TO SIT STILL: 2
2. NOT BEING ABLE TO STOP OR CONTROL WORRYING: 2
1. FEELING NERVOUS, ANXIOUS, OR ON EDGE: 2
IF YOU CHECKED OFF ANY PROBLEMS ON THIS QUESTIONNAIRE, HOW DIFFICULT HAVE THESE PROBLEMS MADE IT FOR YOU TO DO YOUR WORK, TAKE CARE OF THINGS AT HOME, OR GET ALONG WITH OTHER PEOPLE: SOMEWHAT DIFFICULT
6. BECOMING EASILY ANNOYED OR IRRITABLE: 2
7. FEELING AFRAID AS IF SOMETHING AWFUL MIGHT HAPPEN: 0
GAD7 TOTAL SCORE: 12
3. WORRYING TOO MUCH ABOUT DIFFERENT THINGS: 2
4. TROUBLE RELAXING: 2

## 2022-11-29 ASSESSMENT — ENCOUNTER SYMPTOMS
SINUS PRESSURE: 1
EYE PAIN: 0
SORE THROAT: 0
EYE DISCHARGE: 0
COUGH: 0
SHORTNESS OF BREATH: 0
VOMITING: 0
RHINORRHEA: 1
NAUSEA: 0
ABDOMINAL PAIN: 0
CHEST TIGHTNESS: 1

## 2022-11-29 ASSESSMENT — SOCIAL DETERMINANTS OF HEALTH (SDOH): HOW HARD IS IT FOR YOU TO PAY FOR THE VERY BASICS LIKE FOOD, HOUSING, MEDICAL CARE, AND HEATING?: NOT HARD AT ALL

## 2022-11-29 NOTE — PROGRESS NOTES
[address] and [facility]                                             [address] [facility], [address] [place], [place] [address] [place], [address] [place] [address]        Phone: [phone]      Name:  Darleen Leo  :    1975    Consultants:   Patient Care Team:  Elaine River MD as PCP - General (Family Medicine)  [facility]  Elaine River MD as PCP - [facility] Empaneled Provider  Tia Arce    Chief Complaint:     Darleen Leo is a 52 y.o. female  who presents today for an established patient care visit with Personalized Prevention Plan Services as noted below. HPI:     Darleen Leo 52 y.o. female has Seasonal allergies; Anxiety; and Extrinsic asthma without complication on their problem list.       She was in [place]  symptoms started.  she was seen at urgent care, covid, flu negative. Breathing treatment (mild relief), azithromycin, and medrol dose pack. Last day of antibiotics , last steroid . She is not coughing as much as she was but she continues to feel unwell. On  she started having left axillary tenderness, pain with breathing. Her chest feels tight. Clear rhinorrhea, sinus pressure. Post-nasal drip. No fever or chills. Hx of seasonal allergies, worse this time of year. She has been taking dayquil, nyquil, and liquid mucinex. PHQ Scores 2022 11/3/2022 10/4/2022 2022 3/25/2022 2022 2022   PHQ2 Score 4 6 4 3 2 2 2   PHQ9 Score 14 14 14 13 5 8 2     JUAN M 7 SCORE 2022 11/3/2022 10/4/2022 2022 3/25/2022 2022 2022   JUAN M-7 Total Score 12 9 - 7 3 5 5   JUAN M-7 Total Score - - 9 - - - -     Patient verbally contracted to safety and promised to seek medical care urgently if they ever become a danger to themselves. Patient has an established safety plan and identified a support network today.           Patient Active Problem List Diagnosis    Seasonal allergies    Hx of adenomatous polyp of colon    ADHD    Tear of right acetabular labrum    Sensorineural hearing loss (SNHL) of right ear with unrestricted hearing of left ear    Research study patient    Primary osteoarthritis of right hip    Polyp of corpus uteri    Herpetic gingivostomatitis    Endometriosis of pelvic peritoneum    Eating disorder    Chronic female pelvic pain    Anxiety    Extrinsic asthma without complication         Past Medical History:    Past Medical History:   Diagnosis Date    ADHD     Anxiety     Extrinsic asthma without complication     Kidney stone     MDD (major depressive disorder)     has been on zoloft, paxil, celexa. (had severe HAs, and nausea on wellbutrin)    Seasonal allergies        Past Surgical History:  Past Surgical History:   Procedure Laterality Date    ABDOMINAL EXPLORATION SURGERY      BREAST ENHANCEMENT SURGERY      TOTAL HIP ARTHROPLASTY  12/2021    TUBAL LIGATION         Home Meds:  Prior to Visit Medications    Medication Sig Taking? Authorizing Provider   amphetamine-dextroamphetamine (ADDERALL XR) 15 MG extended release capsule Take 1 capsule by mouth daily for 30 days.  Yes Mimi Dalal MD   albuterol sulfate HFA (VENTOLIN HFA) 108 (90 Base) MCG/ACT inhaler Inhale 2 puffs into the lungs 4 times daily as needed for Wheezing Yes Jamila Bolanos MD PhD   montelukast (SINGULAIR) 10 MG tablet TAKE 1 TABLET BY MOUTH EVERY NIGHT Yes Mimi Dalal MD   citalopram (CELEXA) 40 MG tablet Take 1 tablet by mouth daily Yes Mimi Dalal MD   Spacer/Aero-Holding Chambers ZHENG 1 Device by Does not apply route daily as needed (SOB) Yes Mimi Dalal MD   Cetirizine HCl (ZYRTEC PO) Take by mouth Yes Historical Provider, MD       Allergies:    Trazodone, Pcn [penicillins], Cephalosporins, Gluten meal, Other, and Wellbutrin [bupropion]    Family History:       Problem Relation Age of Onset    Colon Cancer Mother     Diabetes Father     Diabetes Maternal Aunt     Diabetes Maternal Grandmother     Heart Disease Paternal Grandmother     Diabetes Maternal Aunt     Diabetes Maternal Aunt     Diabetes Maternal Aunt     Diabetes Maternal Aunt     Diabetes Maternal Aunt          Health Maintenance Completed:  Health Maintenance   Topic Date Due    HIV screen  Never done    Hepatitis C screen  Never done    COVID-19 Vaccine (4 - Booster for Moderna series) 02/18/2022    Lipids  09/10/2023    Depression Monitoring  11/03/2023    Colorectal Cancer Screen  11/08/2024    DTaP/Tdap/Td vaccine (3 - Td or Tdap) 08/31/2025    Cervical cancer screen  06/07/2027    Flu vaccine  Completed    Pneumococcal 0-64 years Vaccine  Completed    Hepatitis A vaccine  Aged Out    Hib vaccine  Aged Out    Meningococcal (ACWY) vaccine  Aged Out          Immunization History   Administered Date(s) Administered    COVID-19, MODERNA BLUE border, Primary or Immunocompromised, (age 12y+), IM, 100 mcg/0.5mL 03/16/2021, 04/13/2021, 12/24/2021    COVID-19, MODERNA Booster BLUE border, (age 18y+), IM, 50mcg/0.25mL 12/24/2021    Influenza Vaccine, unspecified formulation 10/10/2016    Influenza Virus Vaccine 10/17/2013    Influenza, AFLURIA (age 1 yrs+), FLUZONE, (age 10 mo+), MDV, 0.5mL 11/30/2017    Influenza, FLUARIX, FLULAVAL, FLUZONE (age 10 mo+) AND AFLURIA, (age 1 y+), PF, 0.5mL 12/03/2018    Influenza, FLUBLOK, (age 25 y+), PF, 0.5mL 10/07/2020    Influenza, FLUCELVAX, (age 10 mo+), MDCK, MDV, 0.5mL 09/30/2021    Influenza, FLUCELVAX, (age 10 mo+), MDCK, PF, 0.5mL 10/04/2022    Pneumococcal conjugate PCV20, PF (Prevnar 20) 10/04/2022    Td, unspecified formulation 10/22/1991    Tdap (Boostrix, Adacel) 08/31/2015         Review of Systems:  Review of Systems   Constitutional:  Negative for activity change, chills and fever. HENT:  Positive for congestion, rhinorrhea and sinus pressure. Negative for sore throat. Eyes:  Negative for pain and discharge.    Respiratory: Positive for chest tightness. Negative for cough and shortness of breath. Cardiovascular:  Negative for chest pain and palpitations. Gastrointestinal:  Negative for abdominal pain, nausea and vomiting. Genitourinary:  Negative for difficulty urinating and dysuria. Musculoskeletal:  Negative for arthralgias and myalgias. Skin:  Negative for rash and wound. Neurological:  Negative for dizziness and syncope. Psychiatric/Behavioral:  Negative for agitation and behavioral problems. Physical Exam:   Vitals:    11/29/22 1437   BP: 122/80   Pulse: 88   Temp: 98.3 °F (36.8 °C)   SpO2: 99%   Weight: 214 lb (97.1 kg)   Height: 5' 6\" (1.676 m)     Body mass index is 34.54 kg/m². Wt Readings from Last 3 Encounters:   11/29/22 214 lb (97.1 kg)   11/03/22 213 lb (96.6 kg)   10/04/22 214 lb 6.4 oz (97.3 kg)       BP Readings from Last 3 Encounters:   11/29/22 122/80   11/03/22 116/78   10/04/22 124/82       Physical Exam  Constitutional:       Appearance: Normal appearance. HENT:      Head: Normocephalic and atraumatic. Nose: Congestion present. Eyes:      Extraocular Movements: Extraocular movements intact. Conjunctiva/sclera: Conjunctivae normal.      Pupils: Pupils are equal, round, and reactive to light. Cardiovascular:      Rate and Rhythm: Normal rate and regular rhythm. Pulses: Normal pulses. Heart sounds: Normal heart sounds. Pulmonary:      Effort: Pulmonary effort is normal.      Breath sounds: No wheezing, rhonchi or rales. Comments: Decreased air exchange  Musculoskeletal:         General: Normal range of motion. Comments: No palpable axillary lymphadenopathy   Skin:     General: Skin is warm and dry. Capillary Refill: Capillary refill takes less than 2 seconds. Neurological:      General: No focal deficit present. Mental Status: She is alert and oriented to person, place, and time.    Psychiatric:         Mood and Affect: Mood normal. Behavior: Behavior normal.            Lab Review: not applicable       Assessment/Plan:  Brent Henriquez was seen today for asthma. Diagnoses and all orders for this visit:    Viral upper respiratory tract infection  Likely viral URI with prolonged recovery. Cough and congestion may persist for weeks but should gradually improve. May be a component of asthma exacerbation vs other obstructive pulmonary process however, no wheezes on exam, no respiratory distress  Continue Albuterol Q4H  PRN for asthma  Mucinex DM  Continue allergy medication  Flonase twice a day  Hydrate  Call if fever develops  F/u if symptoms do not improve  Recommend PFTs when back at baseline     Health Maintenance Due:  Health Maintenance Due   Topic Date Due    HIV screen  Never done    Hepatitis C screen  Never done    COVID-19 Vaccine (4 - Booster for Moderna series) 02/18/2022      RTC:  Keep previously scheduled appointment with PCP. Return sooner if symptoms worsen or becomes febrile    EMR Dragon/transcription disclaimer:  Much of this encounter note is electronic transcription/translation of spoken language to printed texts. The electronic translation of spoken language may be erroneous, or at times, nonsensical words or phrases may be inadvertently transcribed.   Although I have reviewed the note for such errors, some may still exist.

## 2023-01-24 DIAGNOSIS — M22.2X2 PATELLOFEMORAL PAIN SYNDROME OF LEFT KNEE: ICD-10-CM

## 2023-01-24 NOTE — TELEPHONE ENCOUNTER
Refill Request - Refill Request - 1/3/23 was a no show with Dr. Andi Padilla . Please call pt and reschedule an appointment       Last Seen: Last Seen Department: 11/29/2022  Last Seen by PCP: 10/4/2022    Last Written: 10/4/2022 #1 with 2     Next Appointment:   No future appointments. Message to "[employer]" to schedule appointment.          Requested Prescriptions     Pending Prescriptions Disp Refills    albuterol sulfate HFA (PROVENTIL;VENTOLIN;PROAIR) 108 (90 Base) MCG/ACT inhaler [Pharmacy Med Name: ALBUTEROL HFA INH (200 PUFFS)8.5GM] 8.5 g 2     Sig: INHALE 2 PUFFS INTO THE LUNG FOUR TIMES DAILY AS NEEDED FOR WHEEZING

## 2023-01-25 RX ORDER — ALBUTEROL SULFATE 90 UG/1
AEROSOL, METERED RESPIRATORY (INHALATION)
Qty: 8.5 G | Refills: 2 | Status: SHIPPED | OUTPATIENT
Start: 2023-01-25

## 2023-01-26 ENCOUNTER — TELEPHONE (OUTPATIENT)
Dept: PRIMARY CARE CLINIC | Age: 48
End: 2023-01-26

## 2023-01-26 RX ORDER — CITALOPRAM 40 MG/1
40 TABLET ORAL DAILY
Qty: 90 TABLET | Refills: 1 | Status: SHIPPED | OUTPATIENT
Start: 2023-01-26

## 2023-01-26 NOTE — TELEPHONE ENCOUNTER
Refill Request   Current Outpatient Medications   Medication Sig Dispense Refill    albuterol sulfate HFA (PROVENTIL;VENTOLIN;PROAIR) 108 (90 Base) MCG/ACT inhaler INHALE 2 PUFFS INTO THE LUNG FOUR TIMES DAILY AS NEEDED FOR WHEEZING 8.5 g 2    amphetamine-dextroamphetamine (ADDERALL XR) 15 MG extended release capsule Take 1 capsule by mouth daily for 30 days. 30 capsule 0    montelukast (SINGULAIR) 10 MG tablet TAKE 1 TABLET BY MOUTH EVERY NIGHT 90 tablet 1    citalopram (CELEXA) 40 MG tablet Take 1 tablet by mouth daily 90 tablet 1    Spacer/Aero-Holding Chambers ZHENG 1 Device by Does not apply route daily as needed (SOB) 1 Device 0    Cetirizine HCl (ZYRTEC PO) Take by mouth       No current facility-administered medications for this visit.          Last Seen: Last Seen Department: 11/29/2022  Last Seen by PCP: 11/3/2022    Last Written: 1/25/22    Next Appointment:   Future Appointments   Date Time Provider Rc Ko   3/9/2023  1:00 PM Shannan Oviedo MD [facility] AND [facility]         Requested Prescriptions      No prescriptions requested or ordered in this encounter

## 2023-01-26 NOTE — TELEPHONE ENCOUNTER
citalopram (CELEXA) 40 MG tablet    [facility] [address] [facility], [address] [place] [address] 21   [address], [address] [place] [address]   Phone:  [phone]  Fax:  [phone]      Next OV: 3/9/2023      Pt is OUT of medication.

## 2023-01-27 RX ORDER — CITALOPRAM 40 MG/1
40 TABLET ORAL DAILY
Qty: 90 TABLET | Refills: 1 | OUTPATIENT
Start: 2023-01-27

## 2023-03-09 ENCOUNTER — OFFICE VISIT (OUTPATIENT)
Dept: PRIMARY CARE CLINIC | Age: 48
End: 2023-03-09
Payer: COMMERCIAL

## 2023-03-09 ENCOUNTER — HOSPITAL ENCOUNTER (OUTPATIENT)
Age: 48
Discharge: HOME | End: 2023-03-09
Payer: COMMERCIAL

## 2023-03-09 VITALS
OXYGEN SATURATION: 97 % | DIASTOLIC BLOOD PRESSURE: 72 MMHG | RESPIRATION RATE: 18 BRPM | WEIGHT: 215.8 LBS | SYSTOLIC BLOOD PRESSURE: 116 MMHG | HEIGHT: 66 IN | HEART RATE: 113 BPM | BODY MASS INDEX: 34.68 KG/M2 | TEMPERATURE: 98.5 F

## 2023-03-09 DIAGNOSIS — Z13.220 SCREENING FOR HYPERLIPIDEMIA: ICD-10-CM

## 2023-03-09 DIAGNOSIS — Z13.1 SCREENING FOR DIABETES MELLITUS: ICD-10-CM

## 2023-03-09 DIAGNOSIS — F41.9 ANXIETY: ICD-10-CM

## 2023-03-09 DIAGNOSIS — F90.2 ATTENTION DEFICIT HYPERACTIVITY DISORDER (ADHD), COMBINED TYPE: Primary | ICD-10-CM

## 2023-03-09 DIAGNOSIS — Z11.4 SCREENING FOR HIV (HUMAN IMMUNODEFICIENCY VIRUS): ICD-10-CM

## 2023-03-09 DIAGNOSIS — Z11.59 ENCOUNTER FOR HEPATITIS C SCREENING TEST FOR LOW RISK PATIENT: ICD-10-CM

## 2023-03-09 DIAGNOSIS — J45.20 MILD INTERMITTENT EXTRINSIC ASTHMA WITHOUT COMPLICATION: ICD-10-CM

## 2023-03-09 DIAGNOSIS — E66.09 CLASS 1 OBESITY DUE TO EXCESS CALORIES WITHOUT SERIOUS COMORBIDITY WITH BODY MASS INDEX (BMI) OF 34.0 TO 34.9 IN ADULT: ICD-10-CM

## 2023-03-09 LAB
CHOLESTEROL, TOTAL: 240 MG/DL (ref 0–199)
HDLC SERPL-MCNC: 51 MG/DL (ref 40–60)
HEPATITIS C ANTIBODY INTERPRETATION: NORMAL
LDL CHOLESTEROL CALCULATED: ABNORMAL MG/DL
LDL CHOLESTEROL DIRECT: 149 MG/DL
TRIGL SERPL-MCNC: 574 MG/DL (ref 0–150)
VLDLC SERPL CALC-MCNC: ABNORMAL MG/DL

## 2023-03-09 PROCEDURE — 36415 COLL VENOUS BLD VENIPUNCTURE: CPT

## 2023-03-09 PROCEDURE — 80061 LIPID PANEL: CPT

## 2023-03-09 PROCEDURE — 99214 OFFICE O/P EST MOD 30 MIN: CPT | Performed by: FAMILY MEDICINE

## 2023-03-09 PROCEDURE — 83721 ASSAY OF BLOOD LIPOPROTEIN: CPT

## 2023-03-09 PROCEDURE — 86701 HIV-1ANTIBODY: CPT

## 2023-03-09 PROCEDURE — 83036 HEMOGLOBIN GLYCOSYLATED A1C: CPT

## 2023-03-09 PROCEDURE — 86702 HIV-2 ANTIBODY: CPT

## 2023-03-09 PROCEDURE — 86803 HEPATITIS C AB TEST: CPT

## 2023-03-09 PROCEDURE — 87390 HIV-1 AG IA: CPT

## 2023-03-09 RX ORDER — SEMAGLUTIDE 1.34 MG/ML
0.5 INJECTION, SOLUTION SUBCUTANEOUS WEEKLY
Qty: 1 ADJUSTABLE DOSE PRE-FILLED PEN SYRINGE | Refills: 0 | Status: SHIPPED | OUTPATIENT
Start: 2023-03-09 | End: 2023-04-08

## 2023-03-09 RX ORDER — DEXTROAMPHETAMINE SACCHARATE, AMPHETAMINE ASPARTATE MONOHYDRATE, DEXTROAMPHETAMINE SULFATE AND AMPHETAMINE SULFATE 3.75; 3.75; 3.75; 3.75 MG/1; MG/1; MG/1; MG/1
15 CAPSULE, EXTENDED RELEASE ORAL DAILY
Qty: 30 CAPSULE | Refills: 0 | Status: SHIPPED | OUTPATIENT
Start: 2023-03-09 | End: 2023-04-08

## 2023-03-09 ASSESSMENT — ANXIETY QUESTIONNAIRES
4. TROUBLE RELAXING: 2
GAD7 TOTAL SCORE: 11
5. BEING SO RESTLESS THAT IT IS HARD TO SIT STILL: 2
7. FEELING AFRAID AS IF SOMETHING AWFUL MIGHT HAPPEN: 1
3. WORRYING TOO MUCH ABOUT DIFFERENT THINGS: 2
6. BECOMING EASILY ANNOYED OR IRRITABLE: 1
1. FEELING NERVOUS, ANXIOUS, OR ON EDGE: 2
2. NOT BEING ABLE TO STOP OR CONTROL WORRYING: 1
IF YOU CHECKED OFF ANY PROBLEMS ON THIS QUESTIONNAIRE, HOW DIFFICULT HAVE THESE PROBLEMS MADE IT FOR YOU TO DO YOUR WORK, TAKE CARE OF THINGS AT HOME, OR GET ALONG WITH OTHER PEOPLE: SOMEWHAT DIFFICULT

## 2023-03-09 ASSESSMENT — PATIENT HEALTH QUESTIONNAIRE - PHQ9
SUM OF ALL RESPONSES TO PHQ QUESTIONS 1-9: 14
7. TROUBLE CONCENTRATING ON THINGS, SUCH AS READING THE NEWSPAPER OR WATCHING TELEVISION: 2
8. MOVING OR SPEAKING SO SLOWLY THAT OTHER PEOPLE COULD HAVE NOTICED. OR THE OPPOSITE, BEING SO FIGETY OR RESTLESS THAT YOU HAVE BEEN MOVING AROUND A LOT MORE THAN USUAL: 2
6. FEELING BAD ABOUT YOURSELF - OR THAT YOU ARE A FAILURE OR HAVE LET YOURSELF OR YOUR FAMILY DOWN: 1
SUM OF ALL RESPONSES TO PHQ QUESTIONS 1-9: 14
10. IF YOU CHECKED OFF ANY PROBLEMS, HOW DIFFICULT HAVE THESE PROBLEMS MADE IT FOR YOU TO DO YOUR WORK, TAKE CARE OF THINGS AT HOME, OR GET ALONG WITH OTHER PEOPLE: 1
SUM OF ALL RESPONSES TO PHQ QUESTIONS 1-9: 14
SUM OF ALL RESPONSES TO PHQ9 QUESTIONS 1 & 2: 4
SUM OF ALL RESPONSES TO PHQ QUESTIONS 1-9: 14
5. POOR APPETITE OR OVEREATING: 0
2. FEELING DOWN, DEPRESSED OR HOPELESS: 2
4. FEELING TIRED OR HAVING LITTLE ENERGY: 3
1. LITTLE INTEREST OR PLEASURE IN DOING THINGS: 2
9. THOUGHTS THAT YOU WOULD BE BETTER OFF DEAD, OR OF HURTING YOURSELF: 0
3. TROUBLE FALLING OR STAYING ASLEEP: 2

## 2023-03-09 NOTE — PATIENT INSTRUCTIONS
Call central scheduling for mammogram, pulmonary funciton tests and bronchial challenge:   [address] booster

## 2023-03-09 NOTE — PROGRESS NOTES
PROGRESS NOTE     Margot Lino MD  [address], [address], [place] [address]         Phone: [phone]    Date of Service:  3/9/2023     Patient ID: . Wade Merlin is a 52 y.o. female      Assessment / Plan:       ADHD:   Controlled. continue adderall 15mg XR. UDS  and med contract UTD     Controlled Substance Monitoring:    Acute and Chronic Pain Monitoring:   RX Monitoring 3/10/2023   Attestation -   Periodic Controlled Substance Monitoring No signs of potential drug abuse or diversion identified. Anxiety: Not completely controlled due to running out of medicine last month, but is slowly improving now that she is back on taking her Celexa 40 mg daily. We will continue to monitor. Patient to let me know if symptoms fail to improve all the way as expected. Allergy induced asthma:  not sure if controlled with singulair. Still uses albuterol 2-3 times a week. Not sure if its helping. Will evaluate with PFTs with bronchial challenge. Patient told to let me know if has any shortness of breath, chest tightness, dyspnea on exertion in the meantime. Class 1 obesity due to excess calories without serious comorbidity with body mass index (BMI) of 34.0 to 34.9 in adult  Discussed treatment options. We will start Ozempic 0.25 mg weekly. Reviewed side effects and expected course. Patient to let me know if this medication is covered and if it is working. HM:  - Advised patient to get screening labs (A1C, Lipids, HIV and Hep C) had to re-reorder as never got last year.   We are getting these NOT fasting as pt states unlikely to have time to come back fasting.      - Mammogram ordered last year, encouraged to get  - Pap smear + HPV co-testing both neg 6/7/22  - last colonoscopy was 11/8/19:  next due 2024 (family h/o colon cancer in mother)    Subjective:     CC: obesity, ADHD, Anxiety, Allergy induced asthma    HPI      49-year-old white female here to discuss the above chronic medical conditions. At last visit patient was restarted on Adderall 50 mg XR. She states it is helping a lot. She did run out of it last month and would like to restart it. She states it controls all of her symptoms during the day and she denies any side effects. Patient's anxiety and depression was well controlled with Celexa 40 mg, but ran out of the medicine for an entire month. During this time her symptoms worsened greatly. She restarted it a couple weeks ago. She still having some symptoms as documented in her PHQ-9 below, but states they are improving now that she restarted the medication. At last visit, PFTs were ordered to evaluate possible allergy induced asthma. Patient is yet to schedule these. She states she still needing her albuterol 2-3 times a week. She does get relief when she uses the albuterol. Patient wanted to discuss medications to help her lose weight. She feels that she is trying to eat very healthy and exercise 4-5 times a week. She does not feel that she overeats. Despite this she is having trouble losing weight. 3/9/2023    1259          PHQ-9: Over the past 2 weeks, how often have you been bothered by any of the following problems? Little interest or pleasure in doing things More than half the days   Feeling down, depressed, or hopeless More than half the days   Trouble falling or staying asleep, or sleeping too much More than half the days   Feeling tired or having little energy Nearly every day   Poor appetite or overeating Not at all   Feeling bad about yourself - or that you are a failure or have let yourself or your family down Several days   Trouble concentrating on things, such as reading the newspaper or watching television More than half the days   Moving or speaking so slowly that other people could have noticed.  Or the opposite - being so fidgety or restless that you have been moving around a lot more than usual More than half the days   Thoughts that you would be better off dead, or of hurting yourself in some way Not at all   PHQ-9 Total Score 14   If you checked off any problems, how difficult have these problems made it for you to do your work, take care of things at home, or get along with other people? Somewhat            ROS:  See hpi    Vitals:    03/09/23 1300   BP: 116/72   Site: Right Upper Arm   Position: Sitting   Cuff Size: Large Adult   Pulse: (!) 113   Resp: 18   Temp: 98.5 °F (36.9 °C)   TempSrc: Temporal   SpO2: 97%   Weight: 215 lb 12.8 oz (97.9 kg)   Height: 5' 6\" (1.676 m)       Outpatient Medications Marked as Taking for the 3/9/23 encounter (Office Visit) with Jonas Sigala MD   Medication Sig Dispense Refill    Semaglutide,0.25 or 0.5MG/DOS, (OZEMPIC, 0.25 OR 0.5 MG/DOSE,) 2 MG/1.5ML SOPN Inject 0.5 mg into the skin once a week 1 Adjustable Dose Pre-filled Pen Syringe 0    amphetamine-dextroamphetamine (ADDERALL XR) 15 MG extended release capsule Take 1 capsule by mouth daily for 30 days. 30 capsule 0    citalopram (CELEXA) 40 MG tablet Take 1 tablet by mouth daily 90 tablet 1    albuterol sulfate HFA (PROVENTIL;VENTOLIN;PROAIR) 108 (90 Base) MCG/ACT inhaler INHALE 2 PUFFS INTO THE LUNG FOUR TIMES DAILY AS NEEDED FOR WHEEZING 8.5 g 2    montelukast (SINGULAIR) 10 MG tablet TAKE 1 TABLET BY MOUTH EVERY NIGHT 90 tablet 1    Spacer/Aero-Holding Chambers ZHENG 1 Device by Does not apply route daily as needed (SOB) 1 Device 0    Cetirizine HCl (ZYRTEC PO) Take by mouth               Objective:   Constitutional:   Reviewed vitals above  Well Nourished, well developed, no distress       Neck:  Symmetric and without masses  No thyromegaly  Resp:  Normal effort  Clear to auscultation bilaterally without rhonchi, wheezing or crackles  Cardiovascular:   On auscultation, normal S1 and S2 without murmurs, rubs or gallops  No bruits of bilateral carotids and no JVD  Gastrointestinal:  Nontender, nondistended, and no masses  No hepatosplenomegaly  Musculoskeletal:  Normal Gait  All extremities without clubbing, cyanosis or edema  Skin:  No rashes on inspection  No areas of increased heat or induration on palpation  Psych:  Normal mood and affect  Normal insight and judgement        EMR Dragon/transcription disclaimer:  Much of this encounter note is electronic transcription/translation of spoken language to printed texts. The electronic translation of spoken language may be erroneous, or at times, nonsensical words or phrases may be inadvertently transcribed.   Although I have reviewed the note for such errors, some may still exist.

## 2023-03-10 LAB
ESTIMATED AVERAGE GLUCOSE: 108.3 MG/DL
HBA1C MFR BLD: 5.4 %
HIV AG/AB: NORMAL
HIV ANTIGEN: NORMAL
HIV-1 ANTIBODY: NORMAL
HIV-2 AB: NORMAL

## 2023-03-13 ENCOUNTER — PATIENT MESSAGE (OUTPATIENT)
Dept: PRIMARY CARE CLINIC | Age: 48
End: 2023-03-13

## 2023-03-13 DIAGNOSIS — E78.2 MIXED HYPERLIPIDEMIA: Primary | ICD-10-CM

## 2023-03-13 DIAGNOSIS — R79.89 ELEVATED TSH: ICD-10-CM

## 2023-03-13 NOTE — TELEPHONE ENCOUNTER
From: Britton Rico  To: Dr. Diana Phipps: 3/13/2023 10:43 AM EDT  Subject: Lipid Panel    Hi, Wod you be able to send another order to check my lipids while fasting. Could you also send an order to do a full thyroid panel?     Thanks,  Mandeep Rizo

## 2023-03-21 ENCOUNTER — HOSPITAL ENCOUNTER (OUTPATIENT)
Age: 48
Discharge: HOME | End: 2023-03-21
Payer: COMMERCIAL

## 2023-03-21 DIAGNOSIS — R79.89 ELEVATED TSH: ICD-10-CM

## 2023-03-21 DIAGNOSIS — E78.2 MIXED HYPERLIPIDEMIA: ICD-10-CM

## 2023-03-21 LAB
CHOLEST SERPL-MCNC: 259 MG/DL (ref 0–199)
HDLC SERPL-MCNC: 61 MG/DL (ref 40–60)
LDLC SERPL CALC-MCNC: 170 MG/DL
TRIGL SERPL-MCNC: 141 MG/DL (ref 0–150)
TSH SERPL DL<=0.005 MIU/L-ACNC: 1.61 UIU/ML (ref 0.27–4.2)
VLDLC SERPL CALC-MCNC: 28 MG/DL

## 2023-03-21 PROCEDURE — 84443 ASSAY THYROID STIM HORMONE: CPT

## 2023-03-21 PROCEDURE — 80061 LIPID PANEL: CPT

## 2023-03-21 PROCEDURE — 36415 COLL VENOUS BLD VENIPUNCTURE: CPT

## 2023-04-05 DIAGNOSIS — F90.2 ATTENTION DEFICIT HYPERACTIVITY DISORDER (ADHD), COMBINED TYPE: ICD-10-CM

## 2023-04-05 NOTE — TELEPHONE ENCOUNTER
Refill Request       Last Seen: Last Seen Department: 3/9/2023  Last Seen by PCP: 3/9/2023    Last Written: 3/9/22    Next Appointment:   Future Appointments   Date Time Provider cR Stefani   6/12/2023  3:30 PM Jazmyn Cleveland MD [facility] [address] [facility]       Future appointment scheduled      Requested Prescriptions     Pending Prescriptions Disp Refills    amphetamine-dextroamphetamine (ADDERALL XR) 15 MG extended release capsule 30 capsule 0     Sig: Take 1 capsule by mouth daily for 30 days.

## 2023-04-06 RX ORDER — DEXTROAMPHETAMINE SACCHARATE, AMPHETAMINE ASPARTATE MONOHYDRATE, DEXTROAMPHETAMINE SULFATE AND AMPHETAMINE SULFATE 3.75; 3.75; 3.75; 3.75 MG/1; MG/1; MG/1; MG/1
15 CAPSULE, EXTENDED RELEASE ORAL DAILY
Qty: 30 CAPSULE | Refills: 0 | Status: SHIPPED | OUTPATIENT
Start: 2023-04-06 | End: 2023-05-06

## 2023-04-06 NOTE — TELEPHONE ENCOUNTER
Controlled Substance Monitoring:    Acute and Chronic Pain Monitoring:   RX Monitoring 4/6/2023   Attestation -   Periodic Controlled Substance Monitoring No signs of potential drug abuse or diversion identified.            Med refilled

## 2023-07-21 NOTE — TELEPHONE ENCOUNTER
Refill Request       Last Seen: Last Seen Department: 6/12/2023  Last Seen by PCP: 6/12/2023    Last Written: 6/12/2023 90 with 1    Next Appointment:   Future Appointments   Date Time Provider [address] [place] [address]   12/18/2023  1:00 PM Jazmyn Carney MD [facility] AND [facility]         Requested Prescriptions     Pending Prescriptions Disp Refills    citalopram (CELEXA) 40 MG tablet 90 tablet 1     Sig: Take 1 tablet by mouth daily

## 2023-07-25 RX ORDER — CITALOPRAM 40 MG/1
40 TABLET ORAL DAILY
Qty: 90 TABLET | Refills: 1 | Status: SHIPPED | OUTPATIENT
Start: 2023-07-25

## 2023-08-25 RX ORDER — CITALOPRAM 40 MG/1
40 TABLET ORAL DAILY
Qty: 90 TABLET | Refills: 1 | Status: SHIPPED | OUTPATIENT
Start: 2023-08-25

## 2023-08-25 NOTE — TELEPHONE ENCOUNTER
Refill Request       Last Seen: Last Seen Department: 6/12/2023  Last Seen by PCP: 6/12/2023    Last Written: 07/25/23    Next Appointment:   Future Appointments   Date Time Provider [address] [place] [address]   12/18/2023  1:00 PM Jazmyn Kuo, MD [facility] AND [facility]       Future appointment scheduled      Requested Prescriptions     Pending Prescriptions Disp Refills    citalopram (CELEXA) 40 MG tablet [Pharmacy Med Name: CITALOPRAM 40MG TABLETS] 30 tablet 0     Sig: TAKE 1 TABLET BY MOUTH DAILY

## 2024-01-02 ENCOUNTER — HOSPITAL ENCOUNTER (OUTPATIENT)
Dept: WOMENS IMAGING | Age: 49
Discharge: HOME | End: 2024-01-02
Payer: COMMERCIAL

## 2024-01-02 VITALS — HEIGHT: 66 IN | BODY MASS INDEX: 34.07 KG/M2 | WEIGHT: 212 LBS

## 2024-01-02 DIAGNOSIS — Z12.31 ENCOUNTER FOR SCREENING MAMMOGRAM FOR MALIGNANT NEOPLASM OF BREAST: ICD-10-CM

## 2024-01-02 PROCEDURE — 77063 BREAST TOMOSYNTHESIS BI: CPT

## 2024-01-03 ENCOUNTER — HOSPITAL ENCOUNTER (OUTPATIENT)
Dept: CARDIOLOGY | Age: 49
Discharge: HOME | End: 2024-01-03
Payer: COMMERCIAL

## 2024-01-03 DIAGNOSIS — R42 EPISODIC LIGHTHEADEDNESS: ICD-10-CM

## 2024-01-03 PROCEDURE — 93306 TTE W/DOPPLER COMPLETE: CPT

## 2024-02-23 NOTE — TELEPHONE ENCOUNTER
Refill Request       Return in about 1 month (around 1/18/2024) for VV for anxiety and depression.       Last Seen: Last Seen Department: 12/18/2023  Last Seen by PCP: 12/18/2023    Last Written: 08/25/23 qty 90 w/ 1     Next Appointment:   Future Appointments   Date Time Provider Department Center   3/12/2024 10:15 AM Claudia Higgins MD [facility]       Message to  to schedule appointment.         Requested Prescriptions     Pending Prescriptions Disp Refills    citalopram (CELEXA) 40 MG tablet [Pharmacy Med Name: CITALOPRAM 40MG TABLETS] 90 tablet 1     Sig: TAKE 1 TABLET BY MOUTH DAILY

## 2024-02-27 RX ORDER — CITALOPRAM 40 MG/1
40 TABLET ORAL DAILY
Qty: 90 TABLET | Refills: 1 | Status: SHIPPED | OUTPATIENT
Start: 2024-02-27

## 2024-03-11 ENCOUNTER — TELEPHONE (OUTPATIENT)
Dept: BARIATRICS/WEIGHT MGMT | Age: 49
End: 2024-03-11

## 2024-03-11 NOTE — TELEPHONE ENCOUNTER
Called as a new pt courtesy call - spoke w patient.  Did receive paperwork - told patient to have new pt paperwork completely filled out, insurance card, and id and to arrive at appt time. If they didn't have the paperwork filled out and arrive on time may be rescheduled.

## 2024-03-12 ENCOUNTER — OFFICE VISIT (OUTPATIENT)
Dept: BARIATRICS/WEIGHT MGMT | Age: 49
End: 2024-03-12
Payer: COMMERCIAL

## 2024-03-12 VITALS
HEIGHT: 66 IN | BODY MASS INDEX: 34.78 KG/M2 | DIASTOLIC BLOOD PRESSURE: 70 MMHG | HEART RATE: 89 BPM | RESPIRATION RATE: 18 BRPM | OXYGEN SATURATION: 97 % | SYSTOLIC BLOOD PRESSURE: 118 MMHG | WEIGHT: 216.4 LBS

## 2024-03-12 DIAGNOSIS — E66.01 SEVERE OBESITY (BMI 35.0-39.9) WITH COMORBIDITY (HCC): Primary | ICD-10-CM

## 2024-03-12 DIAGNOSIS — E78.2 MIXED HYPERLIPIDEMIA: ICD-10-CM

## 2024-03-12 PROCEDURE — 99204 OFFICE O/P NEW MOD 45 MIN: CPT | Performed by: SURGERY

## 2024-03-12 NOTE — PROGRESS NOTES
[facility]   [facility]  Claudia Higgins MD, FACS, [facility]  [address], [address]    [facility] [address] .  Phone: [phone]  Fax: [phone]         Medical Weight Loss Consultation         Chief Complaint   Patient presents with    Bariatric, Initial Visit     NP [place]         HPI:    Hortencia Arzate is a very pleasant 48 y.o. obese female ,   Body mass index is 35.46 kg/m². And multiple medical problems who is presenting for weight loss evaluation and consultation by Dr. Monet, Jazmyn Rebollar.    Patient has been struggling for several years now with obesity. Patient feels the weight is an obstacle to achieve and perform things in  daily living and is posing risk on health.     Tries to diet, and exercise but can't keep the weight off.  Patient tried calorie restriction, fasting, , and low carb.   Patient has not participated in meal replacement/liquid diets.  Patient has participated in weight loss medications - plenity and other regimens, but with no sustainable weight loss.     Patient  is very determined to lose weight and be healthy, and is interested in joining our weight loss program to achieve this goal.    Otherwise patient denies any nausea, vomiting, fevers, chills, shortness of breath, chest pain, constipation or urinary symptoms.      Pain Assessment   Denies any abdominal pain     Past Medical History:   Diagnosis Date    ADHD     Anxiety     Arthritis     Extrinsic asthma without complication     Fibromyalgia     Kidney stone     MDD (major depressive disorder)     has been on zoloft, paxil, celexa.  (had severe HAs, and nausea on wellbutrin)    Seasonal allergies      Past Surgical History:   Procedure Laterality Date    ABDOMINAL EXPLORATION SURGERY      BREAST ENHANCEMENT SURGERY      TOTAL HIP ARTHROPLASTY  12/2021    TUBAL LIGATION       Family History   Problem Relation Age of Onset    Colon Cancer Mother     Diabetes Father

## 2024-03-12 NOTE — PROGRESS NOTES
Hortencia Arzate is a 48 y.o. female with a date of birth of 1975.    Vitals:    03/12/24 1008   BP: 118/70   Pulse: 89   Resp: 18   SpO2: 97%   Weight: 98.2 kg (216 lb 6.4 oz)   Height: 1.664 m (5' 5.5\")    BMI: Body mass index is 35.46 kg/m². Obesity Classification: Class II    Weight History:   Wt Readings from Last 3 Encounters:   03/12/24 98.2 kg (216 lb 6.4 oz)   01/02/24 96.2 kg (212 lb)   12/18/23 97.5 kg (215 lb)       Patient's lowest adult weight was 125 lb at age 32.      Patient's highest adult weight was 215 lb at age 48.      Patient has participated in the following weight loss programs: calorie restriction, fasting, , and low carb.   Patient has not participated in meal replacement/liquid diets.  Patient has participated in weight loss medications - plenity.    Patient is not lactose intolerant but reports high fat milk products make her have diarrhea.  Patient does not have Episcopal/cultural food concerns.  Patient does not have food allergies. Pt reports artificial sweeteners give her headaches. Gluten makes her stomach swell and joints hurt.     24 hour recall/food frequency chart: Pt eats 3 x day. Pt is in early menopause.   Breakfast: 2 slices of aureliano raisin bread/english muffin and coffee with creamer with monkfuit - more rushed in the morning OR rare - fruit with honey with granola and skyr yogurt OR eggs (on vacation)   Snack: nothing   Lunch: chicken tortilla soup with sparkling water OR [employer] salad kit - tortilla/avocado - no substantial protein   Snack: nothing   Dinner: chicken breast with asparagus and red potatoes with water OR taco salad with ground turkey OR frozen pizza with GF crust   Snack: nothing   Drinks throughout the day: Drinks water, sparkling water, coffee with creamer and monkfruit sweetener, tea with monkfruit/unsweetened. Drinking around 48 oz of total fluids   Do you drink alcohol? yes. How often/how much alcohol do you drink: 2-4 glasses

## 2024-03-26 NOTE — PATIENT INSTRUCTIONS
Key dietary points:     You should be eating protein at every meal and snack.  Protein is typically found in animal sources, i.e. chicken, lean beef, lean pork, fish, seafood and eggs. It is also found in low-fat dairy sources such as skim or 1% milk, low-fat yogurt, low-fat cheese, and low-fat cottage cheese. Plant based sources of protein include peanut butter, beans, nuts, seeds, hummus and soy.    Meats (preferably organic or grass fed) are great sources of protein and have no carbohydrates.    It is suggested to use coconut, olive, avocado, or almond oils. Choose vegetables that grow above ground as they are generally lower in carbohydrates and higher in fiber.    Avoid starches such as bread, rice, potatoes, pasta and all sources of simple sugars (desserts, soda, breakfast cereals).    Choose beverages that are low in calories and sugar.  You should be drinking 64 ounces of low calorie (5 calories or less per serving) fluids per day. Suggestions include:  Water (you may add fresh fruit, lemon, lime, cucumber or mint)  Crystal Light  Colby Liquid Water Enhancer  Propel Zero  Powerade Zero/Gatorade Zero  Isopure  Ltjadbw6x  Khmoq1o  SOBE Lifewater Zero  Vitamin Water Zero  Sugar Free Ranulfo-Aid    You should be eating 4-5 times per day.  Three small meals plus 1-2 snacks per day is your goal. This balances your calories and nutrients evenly throughout the day and helps to boost your metabolism. Refer to the snack list provided at your initial visit.     You should be utilizing the 9-inch plate method.  Eating on a smaller plate will help you control portion sizes, but what you put on your plate counts. Make ¼ of your plate lean protein, ¼ carbohydrate (fruit or dairy) and ½ the plate non-starchy vegetables.    You should be reducing added fat and sugar in your diet.  Frying foods adds too much fat and calories, but you could use an air fryer as it requires significantly less oil. Baking, broiling, or grilling meats

## 2024-04-15 ENCOUNTER — OFFICE VISIT (OUTPATIENT)
Dept: BARIATRICS/WEIGHT MGMT | Age: 49
End: 2024-04-15
Payer: COMMERCIAL

## 2024-04-15 VITALS
HEART RATE: 99 BPM | BODY MASS INDEX: 34.13 KG/M2 | WEIGHT: 212.4 LBS | HEIGHT: 66 IN | SYSTOLIC BLOOD PRESSURE: 121 MMHG | DIASTOLIC BLOOD PRESSURE: 75 MMHG

## 2024-04-15 DIAGNOSIS — E66.01 SEVERE OBESITY (BMI 35.0-39.9) WITH COMORBIDITY (HCC): ICD-10-CM

## 2024-04-15 DIAGNOSIS — E78.2 MIXED HYPERLIPIDEMIA: Primary | ICD-10-CM

## 2024-04-15 PROCEDURE — 99214 OFFICE O/P EST MOD 30 MIN: CPT | Performed by: NURSE PRACTITIONER

## 2024-04-15 RX ORDER — PROGESTERONE 100 MG/1
100 CAPSULE ORAL NIGHTLY
COMMUNITY
Start: 2024-01-08

## 2024-04-15 RX ORDER — DEXAMETHASONE 6 MG/1
6 TABLET ORAL DAILY
COMMUNITY
Start: 2024-04-03 | End: 2024-04-15

## 2024-04-15 RX ORDER — SEMAGLUTIDE 0.25 MG/.5ML
0.25 INJECTION, SOLUTION SUBCUTANEOUS
COMMUNITY

## 2024-04-15 RX ORDER — ESTRADIOL 0.05 MG/D
PATCH, EXTENDED RELEASE TRANSDERMAL
COMMUNITY
Start: 2024-04-01

## 2024-04-15 NOTE — PROGRESS NOTES
Hortencia Arzate lost 4  lbs over 1 mos. Lactose intolerance, avoids gluten and artificial sweeteners.     Treatment plan details: 1200 kcal LCMP  Is patient adhering to treatment plan: yes tracking with [facility]     Is pt eating 4-5 times each day? No, generally 3 meals a day, having sugar cravings. Discussed prioritizing fluid and opting for a protein bar as a snack, or having a sweet treat, but look for a sugar free option/ limit portion.    Breakfast: greek yogurt + keto granola + fruit + honey OR 2 HB eggs OR egg bake (sausage/egg/cheese)  Snack: none  Lunch: struggles with what to pack - eats while working   Snack: none  Dinner: steak + steamed veg OR chx + salad   Snack: none OR sweet tooth    Is pt including lean protein with all meals and snacks? yes      Is pt avoiding added sugars and starchy foods? yes      Consuming at least 64oz of calorie free fluids? yes 16-24 oz water, coffee + monkfruit + chobani creamer, tea, sparkling water  Has cut back on alcohol    Participating in intentional exercise?  Was recently in ER for knee pain, start PT in a couple weeks.     Plan/Goals:   - Aim for 64 oz of calorie free fluid, switch to decaf herbal tea  - Try chair exercises with hand weights and/or resistance band to focus on upper body    Handouts: protein bars and shakes     AD CHEN, MS, RD, LD  
Conjunctiva/sclera: Conjunctivae normal.      Pupils: Pupils are equal, round, and reactive to light.   Cardiovascular:      Rate and Rhythm: Normal rate.   Pulmonary:      Effort: Pulmonary effort is normal.   Abdominal:      Palpations: Abdomen is soft.   Musculoskeletal:         General: Normal range of motion.      Cervical back: Normal range of motion and neck supple.   Skin:     General: Skin is warm and dry.   Neurological:      Mental Status: She is alert and oriented to person, place, and time.   Psychiatric:         Mood and Affect: Mood normal.         Behavior: Behavior normal.         Thought Content: Thought content normal.         Judgment: Judgment normal.     Assessment and Plan:    Patient is here for their 2nd medical weight loss visit, down 4 lbs and a total weight loss of 4 lbs. The patient's current Body mass index is 34.81 kg/m². (4/15/24). She is doing well, making dietary and behavior modifications. She is following dietary recommendations utilizing the 1200 calorie LCMP. She is tracking in [facility] which will help keep her on track. She could drink a little more water throughout the day. Patient's OB has already started her on Wegovy and currently she is on her second week of the 0.25 mg dose. She did speak with the registered dietitian for continued follow up. Discussed with dietitian and I agree with recommendations and plan. She is limited with exercising due to right hip and left knee pain. Was in the ED at the beginning of April received a weeks worth of steroids, but did not take pain medication of NSAID. Encouraged physical activity as tolerated and as directed by physical therapy which is starting on April 24th. We will see her back in 1 month for continued follow up.     Hyperlipidemia:   [x] Continue to make dietary and lifestyle modifications per our recommendations.  [x] Weight loss recommended.  [] Continue to follow up with their PCP for medication management and monitoring.  []

## 2024-04-17 PROBLEM — E66.9 OBESITY (BMI 30.0-34.9): Status: ACTIVE | Noted: 2024-04-17

## 2024-04-17 PROBLEM — E66.811 OBESITY (BMI 30.0-34.9): Status: ACTIVE | Noted: 2024-04-17

## 2024-08-22 NOTE — TELEPHONE ENCOUNTER
Overdue for appt.     Please schedule so I can send meds in    You can schedule at noon on 9/10/24 or 9/17/14   or she can see a resident doctor if needs to be seen sooner      Once scheduled, route back

## 2024-08-22 NOTE — TELEPHONE ENCOUNTER
Refill Request       Last Seen: Last Seen Department: 12/18/2023  Last Seen by PCP: 12/18/2023    Last Written: 2/27/24 90 with 1    Next Appointment:   No future appointments.    Requested Prescriptions     Pending Prescriptions Disp Refills    citalopram (CELEXA) 40 MG tablet [Pharmacy Med Name: CITALOPRAM 40MG TABLETS] 90 tablet 1     Sig: TAKE 1 TABLET BY MOUTH DAILY

## 2024-08-27 RX ORDER — CITALOPRAM HYDROBROMIDE 40 MG/1
40 TABLET ORAL DAILY
Qty: 90 TABLET | Refills: 0 | Status: SHIPPED | OUTPATIENT
Start: 2024-08-27

## 2024-08-28 NOTE — PROGRESS NOTES
PROGRESS NOTE   [facility]                                  [address], [address], [facility] [address]         Phone: [phone]    Date of Service:  8/30/2024     Patient ID: .Hortencia Arzate is a 49 y.o. female      Subjective:     CC: Hortencia Arzate is a 49 y.o. female w pmh of Asthma, Herpetic gingivostomatitis, ADHD, Anxiety, SNHL of right ear, Primary osteoarthritis of right hip, Endometriosis of pelvic peritoneum, HLD and obesity here for a f/u visit regarding anxiety medication     HPI  Interim History:  Pt here to follow up on her anxiety and depression and regarding its control with celexa. She states that her depressive and anxious symptoms have been very well controlled with celexa, which she has been taking for 3 years. At one point she tried to stop taking the medication last fall but symptoms of depression and anxiety returned. Does not feel like celexa has lost its effectiveness today and would like to continue medication.     In regard to anxiety, pt states that she is always an anxious and tense person and is unable to relax. It is something that she has had for many years. However, she reports that she is less anxious as of late and has no further complaints.     In regard to depression, pt states that lately she has been feeling better than she has ever felt and has no complaints. States depression is well controlled with current treatment plan.Denies SI/HI today. Last time she had such SI was at age 21.     She has not seen a therapist or psychiatrist in a long time. Last time she saw a therapist was a year ago and stopped attending sessions with therapist as she found them to be unprofessional. She however states that she benefited greatly from therapists she liked in the past and would like to start seeing one again.    She has tried wellbutrin, zoloft, other SSRI's and SNRI's in the past. States that celexa has

## 2024-08-30 ENCOUNTER — OFFICE VISIT (OUTPATIENT)
Dept: PRIMARY CARE CLINIC | Age: 49
End: 2024-08-30

## 2024-08-30 VITALS
HEIGHT: 66 IN | BODY MASS INDEX: 31.02 KG/M2 | SYSTOLIC BLOOD PRESSURE: 120 MMHG | HEART RATE: 93 BPM | TEMPERATURE: 98.1 F | WEIGHT: 193 LBS | OXYGEN SATURATION: 98 % | DIASTOLIC BLOOD PRESSURE: 72 MMHG

## 2024-08-30 DIAGNOSIS — Z13.31 POSITIVE DEPRESSION SCREENING: ICD-10-CM

## 2024-08-30 DIAGNOSIS — F41.9 ANXIETY AND DEPRESSION: Primary | ICD-10-CM

## 2024-08-30 DIAGNOSIS — Z00.00 HEALTH CARE MAINTENANCE: ICD-10-CM

## 2024-08-30 DIAGNOSIS — R00.0 TACHYCARDIA: ICD-10-CM

## 2024-08-30 DIAGNOSIS — F32.A ANXIETY AND DEPRESSION: Primary | ICD-10-CM

## 2024-08-30 SDOH — ECONOMIC STABILITY: FOOD INSECURITY: WITHIN THE PAST 12 MONTHS, THE FOOD YOU BOUGHT JUST DIDN'T LAST AND YOU DIDN'T HAVE MONEY TO GET MORE.: NEVER TRUE

## 2024-08-30 SDOH — ECONOMIC STABILITY: INCOME INSECURITY: HOW HARD IS IT FOR YOU TO PAY FOR THE VERY BASICS LIKE FOOD, HOUSING, MEDICAL CARE, AND HEATING?: NOT HARD AT ALL

## 2024-08-30 SDOH — ECONOMIC STABILITY: FOOD INSECURITY: WITHIN THE PAST 12 MONTHS, YOU WORRIED THAT YOUR FOOD WOULD RUN OUT BEFORE YOU GOT MONEY TO BUY MORE.: NEVER TRUE

## 2024-08-30 ASSESSMENT — PATIENT HEALTH QUESTIONNAIRE - PHQ9
4. FEELING TIRED OR HAVING LITTLE ENERGY: NEARLY EVERY DAY
SUM OF ALL RESPONSES TO PHQ QUESTIONS 1-9: 10
6. FEELING BAD ABOUT YOURSELF - OR THAT YOU ARE A FAILURE OR HAVE LET YOURSELF OR YOUR FAMILY DOWN: MORE THAN HALF THE DAYS
SUM OF ALL RESPONSES TO PHQ QUESTIONS 1-9: 10
SUM OF ALL RESPONSES TO PHQ QUESTIONS 1-9: 10
7. TROUBLE CONCENTRATING ON THINGS, SUCH AS READING THE NEWSPAPER OR WATCHING TELEVISION: MORE THAN HALF THE DAYS
SUM OF ALL RESPONSES TO PHQ QUESTIONS 1-9: 10
9. THOUGHTS THAT YOU WOULD BE BETTER OFF DEAD, OR OF HURTING YOURSELF: NOT AT ALL
8. MOVING OR SPEAKING SO SLOWLY THAT OTHER PEOPLE COULD HAVE NOTICED. OR THE OPPOSITE, BEING SO FIGETY OR RESTLESS THAT YOU HAVE BEEN MOVING AROUND A LOT MORE THAN USUAL: NOT AT ALL
5. POOR APPETITE OR OVEREATING: NOT AT ALL
2. FEELING DOWN, DEPRESSED OR HOPELESS: SEVERAL DAYS
1. LITTLE INTEREST OR PLEASURE IN DOING THINGS: NOT AT ALL
SUM OF ALL RESPONSES TO PHQ9 QUESTIONS 1 & 2: 1
3. TROUBLE FALLING OR STAYING ASLEEP: MORE THAN HALF THE DAYS
10. IF YOU CHECKED OFF ANY PROBLEMS, HOW DIFFICULT HAVE THESE PROBLEMS MADE IT FOR YOU TO DO YOUR WORK, TAKE CARE OF THINGS AT HOME, OR GET ALONG WITH OTHER PEOPLE: NOT DIFFICULT AT ALL

## 2024-08-30 ASSESSMENT — ANXIETY QUESTIONNAIRES
2. NOT BEING ABLE TO STOP OR CONTROL WORRYING: SEVERAL DAYS
IF YOU CHECKED OFF ANY PROBLEMS ON THIS QUESTIONNAIRE, HOW DIFFICULT HAVE THESE PROBLEMS MADE IT FOR YOU TO DO YOUR WORK, TAKE CARE OF THINGS AT HOME, OR GET ALONG WITH OTHER PEOPLE: NOT DIFFICULT AT ALL
4. TROUBLE RELAXING: NEARLY EVERY DAY
6. BECOMING EASILY ANNOYED OR IRRITABLE: NEARLY EVERY DAY
3. WORRYING TOO MUCH ABOUT DIFFERENT THINGS: SEVERAL DAYS
5. BEING SO RESTLESS THAT IT IS HARD TO SIT STILL: NEARLY EVERY DAY
1. FEELING NERVOUS, ANXIOUS, OR ON EDGE: NOT AT ALL
7. FEELING AFRAID AS IF SOMETHING AWFUL MIGHT HAPPEN: NOT AT ALL
GAD7 TOTAL SCORE: 11

## 2024-08-31 PROBLEM — R00.0 TACHYCARDIA: Status: ACTIVE | Noted: 2024-08-31

## 2024-09-29 PROBLEM — Z00.00 HEALTH CARE MAINTENANCE: Status: RESOLVED | Noted: 2024-08-30 | Resolved: 2024-09-29

## 2024-10-01 RX ORDER — CITALOPRAM HYDROBROMIDE 40 MG/1
40 TABLET ORAL DAILY
Qty: 90 TABLET | Refills: 0 | Status: SHIPPED | OUTPATIENT
Start: 2024-10-01

## 2024-10-01 NOTE — TELEPHONE ENCOUNTER
Refill Request       Last Seen: Last Seen Department: 8/30/2024  Last Seen by PCP: 12/18/2023    Last Written: 8/27/24 90 with 0    Next Appointment:   Future Appointments   Date Time Provider Department Center   2/28/2025 10:00 AM Jazmyn Monet MD [facility] AND [facility] DE[facility]       Future appointment scheduled      Requested Prescriptions     Pending Prescriptions Disp Refills    citalopram (CELEXA) 40 MG tablet [Pharmacy Med Name: CITALOPRAM 40MG TABLETS] 90 tablet 0     Sig: TAKE 1 TABLET BY MOUTH DAILY

## 2024-12-23 ENCOUNTER — OFFICE VISIT (OUTPATIENT)
Dept: PRIMARY CARE CLINIC | Age: 49
End: 2024-12-23

## 2024-12-23 VITALS
BODY MASS INDEX: 29.57 KG/M2 | HEIGHT: 66 IN | OXYGEN SATURATION: 98 % | WEIGHT: 184 LBS | DIASTOLIC BLOOD PRESSURE: 70 MMHG | HEART RATE: 82 BPM | SYSTOLIC BLOOD PRESSURE: 120 MMHG

## 2024-12-23 DIAGNOSIS — G43.109 VERTIGINOUS MIGRAINE: Primary | ICD-10-CM

## 2024-12-23 DIAGNOSIS — G43.109 VERTIGINOUS MIGRAINE: ICD-10-CM

## 2024-12-23 RX ORDER — SUMATRIPTAN SUCCINATE 25 MG/1
25 TABLET ORAL DAILY PRN
Qty: 9 TABLET | Refills: 0 | Status: SHIPPED | OUTPATIENT
Start: 2024-12-23

## 2024-12-23 RX ORDER — TOPIRAMATE 50 MG/1
TABLET, FILM COATED ORAL
Qty: 60 TABLET | Refills: 1 | Status: SHIPPED | OUTPATIENT
Start: 2024-12-23

## 2024-12-23 NOTE — PROGRESS NOTES
[facility]                                  [address], [address], [facility] [address]         Phone: [phone]    Date of Service:  12/23/2024    CC:  vertigo    Subjective     HPI    -vertigo and headache since 12/01/2024, daily, trigger and spontaneous  -happened once before 8-9 years ago, no headache at that time, saw ENT with dx of BPPV  -been doing Epley maneuver daily, multiple times per day w/o improvement  -vomiting because of headache and vertigo  -describes vertigo as room spinning  -hospitalized for headache under the age 10 twice  -headache was not as bad therefore not on meds  -headache restarted and worsened last 4-5 years  -headache is mainly on one-sided head/face  -headache started before vertigo      ROS    Relevant ROS were mentioned in HPI    Vitals:    12/23/24 1530   BP: 120/70   Site: Left Upper Arm   Position: Sitting   Cuff Size: Large Adult   Pulse: 82   SpO2: 98%   Weight: 83.5 kg (184 lb)   Height: 1.664 m (5' 5.51\")       ALG  Trazodone, Pcn [penicillins], Cephalosporins, Gluten meal, Other, and Wellbutrin [bupropion]    Outpatient Medications Marked as Taking for the 12/23/24 encounter (Office Visit) with Mehdi Gong MD PhD   Medication Sig Dispense Refill    topiramate (TOPAMAX) 50 MG tablet 0.5 tab po qhs x 1 week, then 0.5 tab po BID x 1 week, then 0.5 tab po qam and 1 tab po qhs x 1 week, then 1 tab po BID 60 tablet 1    SUMAtriptan (IMITREX) 25 MG tablet Take 1 tablet by mouth daily as needed for Migraine No more than 2 in 24 hours 9 tablet 0    [DISCONTINUED] citalopram (CELEXA) 40 MG tablet TAKE 1 TABLET BY MOUTH DAILY 90 tablet 0    estradiol (VIVELLE) 0.05 MG/24HR APPLY 1 PATCH TOPICALLY TO THE SKIN 2 TIMES A WEEK      progesterone (PROMETRIUM) 100 MG CAPS capsule Take 1 capsule by mouth at bedtime      Semaglutide-Weight Management (WEGOVY) 0.25 MG/0.5ML SOAJ SC injection Inject 0.25 mg into

## 2024-12-24 ENCOUNTER — HOSPITAL ENCOUNTER (OUTPATIENT)
Dept: MRI IMAGING | Age: 49
Discharge: HOME | End: 2024-12-24
Payer: COMMERCIAL

## 2024-12-24 DIAGNOSIS — G43.109 VERTIGINOUS MIGRAINE: ICD-10-CM

## 2024-12-24 PROCEDURE — A9579 GAD-BASE MR CONTRAST NOS,1ML: HCPCS | Performed by: FAMILY MEDICINE

## 2024-12-24 PROCEDURE — 6360000004 HC RX CONTRAST MEDICATION: Performed by: FAMILY MEDICINE

## 2024-12-24 PROCEDURE — 70553 MRI BRAIN STEM W/O & W/DYE: CPT

## 2024-12-24 RX ORDER — TOPIRAMATE 50 MG/1
TABLET, FILM COATED ORAL
Qty: 180 TABLET | OUTPATIENT
Start: 2024-12-24

## 2024-12-24 RX ADMIN — GADOTERIDOL 17 ML: 279.3 INJECTION, SOLUTION INTRAVENOUS at 07:16

## 2024-12-26 RX ORDER — CITALOPRAM HYDROBROMIDE 40 MG/1
40 TABLET ORAL DAILY
Qty: 90 TABLET | Refills: 0 | Status: SHIPPED | OUTPATIENT
Start: 2024-12-26

## 2024-12-26 NOTE — TELEPHONE ENCOUNTER
Refill Request       Last Seen: Last Seen Department: 12/23/2024  Last Seen by PCP: Visit date not found    Last Written: 10/1/24    Next Appointment: 1/17/25  Future Appointments   Date Time Provider Department Center   1/17/2025 11:30 AM Jazmyn Monet MD [facility] AND [facility]   2/28/2025 10:00 AM Jazmyn Monet MD [facility] AND [facility]       Future appointment scheduled      Requested Prescriptions     Pending Prescriptions Disp Refills    citalopram (CELEXA) 40 MG tablet [Pharmacy Med Name: CITALOPRAM 40MG TABLETS] 90 tablet 0     Sig: TAKE 1 TABLET BY MOUTH DAILY

## 2025-01-17 ENCOUNTER — OFFICE VISIT (OUTPATIENT)
Dept: PRIMARY CARE CLINIC | Age: 50
End: 2025-01-17

## 2025-01-17 VITALS
OXYGEN SATURATION: 97 % | HEIGHT: 66 IN | WEIGHT: 182.6 LBS | SYSTOLIC BLOOD PRESSURE: 110 MMHG | HEART RATE: 91 BPM | DIASTOLIC BLOOD PRESSURE: 60 MMHG | BODY MASS INDEX: 29.35 KG/M2

## 2025-01-17 DIAGNOSIS — G43.109 VERTIGINOUS MIGRAINE: ICD-10-CM

## 2025-01-17 RX ORDER — TOPIRAMATE 50 MG/1
50 TABLET, FILM COATED ORAL 2 TIMES DAILY
Qty: 180 TABLET | Refills: 1 | Status: SHIPPED | OUTPATIENT
Start: 2025-01-17

## 2025-01-17 RX ORDER — SIMETHICONE 125 MG
125 TABLET,CHEWABLE ORAL EVERY 6 HOURS PRN
COMMUNITY
Start: 2024-11-04

## 2025-01-17 RX ORDER — MELOXICAM 15 MG/1
15 TABLET ORAL DAILY
COMMUNITY
Start: 2024-05-07

## 2025-01-17 SDOH — ECONOMIC STABILITY: FOOD INSECURITY: WITHIN THE PAST 12 MONTHS, YOU WORRIED THAT YOUR FOOD WOULD RUN OUT BEFORE YOU GOT MONEY TO BUY MORE.: NEVER TRUE

## 2025-01-17 SDOH — ECONOMIC STABILITY: FOOD INSECURITY: WITHIN THE PAST 12 MONTHS, THE FOOD YOU BOUGHT JUST DIDN'T LAST AND YOU DIDN'T HAVE MONEY TO GET MORE.: NEVER TRUE

## 2025-01-17 ASSESSMENT — PATIENT HEALTH QUESTIONNAIRE - PHQ9
7. TROUBLE CONCENTRATING ON THINGS, SUCH AS READING THE NEWSPAPER OR WATCHING TELEVISION: NOT AT ALL
SUM OF ALL RESPONSES TO PHQ QUESTIONS 1-9: 5
2. FEELING DOWN, DEPRESSED OR HOPELESS: SEVERAL DAYS
SUM OF ALL RESPONSES TO PHQ QUESTIONS 1-9: 5
1. LITTLE INTEREST OR PLEASURE IN DOING THINGS: SEVERAL DAYS
8. MOVING OR SPEAKING SO SLOWLY THAT OTHER PEOPLE COULD HAVE NOTICED. OR THE OPPOSITE, BEING SO FIGETY OR RESTLESS THAT YOU HAVE BEEN MOVING AROUND A LOT MORE THAN USUAL: MORE THAN HALF THE DAYS
4. FEELING TIRED OR HAVING LITTLE ENERGY: SEVERAL DAYS
SUM OF ALL RESPONSES TO PHQ QUESTIONS 1-9: 5
SUM OF ALL RESPONSES TO PHQ9 QUESTIONS 1 & 2: 2
9. THOUGHTS THAT YOU WOULD BE BETTER OFF DEAD, OR OF HURTING YOURSELF: NOT AT ALL
10. IF YOU CHECKED OFF ANY PROBLEMS, HOW DIFFICULT HAVE THESE PROBLEMS MADE IT FOR YOU TO DO YOUR WORK, TAKE CARE OF THINGS AT HOME, OR GET ALONG WITH OTHER PEOPLE: EXTREMELY DIFFICULT
3. TROUBLE FALLING OR STAYING ASLEEP: NOT AT ALL
5. POOR APPETITE OR OVEREATING: NOT AT ALL
SUM OF ALL RESPONSES TO PHQ QUESTIONS 1-9: 5
6. FEELING BAD ABOUT YOURSELF - OR THAT YOU ARE A FAILURE OR HAVE LET YOURSELF OR YOUR FAMILY DOWN: NOT AT ALL

## 2025-01-17 NOTE — PROGRESS NOTES
PROGRESS NOTE   [facility]                                  [address], [address], [place] [address]         Phone: [phone]    Date of Service:  1/17/2025     Patient ID: Hortencia Arzate is a 49 y.o. female      Subjective:     CC: Follow-up      HPI  Patient is a 49 y.o. year old female that has Seasonal allergies; Hx of adenomatous polyp of colon; ADHD; Tear of right acetabular labrum; Sensorineural hearing loss (SNHL) of right ear with unrestricted hearing of left ear; Research study patient; Primary osteoarthritis of right hip; Polyp of corpus uteri; Herpetic gingivostomatitis; Endometriosis of pelvic peritoneum; Eating disorder; Chronic female pelvic pain; Anxiety; Extrinsic asthma without complication; Severe obesity (BMI 35.0-39.9) with comorbidity; Mixed hyperlipidemia; Obesity (BMI 30.0-34.9); Anxiety and depression; Positive depression screening; Tachycardia; and Vertiginous migraine on their problem list..  Patient is presenting for above mentioned concerns.    Patient is following up with regards to her migraine medication.  She states that she is doing really well on the medication.  She states that she is almost at the taper dose where she is taking Topamax 1 mg twice daily.  She has not had any migraines since starting this medication.  She has not had to use the sumatriptan at all since starting this medication.  Of note her MRI was negative that was previously ordered.  She states that she is feeling very well and denies any acute concerns at this time.    ROS:  A comprehensive review of systems was negative except for what was noted in the HPI.    Vitals:    01/17/25 1113   BP: 110/60   Site: Left Upper Arm   Position: Sitting   Cuff Size: Large Adult   Pulse: 91   SpO2: 97%   Weight: 82.8 kg (182 lb 9.6 oz)   Height: 1.664 m (5' 5.51\")       Allergies:  Trazodone, Pcn [penicillins], Cephalosporins, Gluten meal,

## 2025-01-17 NOTE — ASSESSMENT & PLAN NOTE
Chronic, at goal (stable), continue current treatment plan and medication adherence emphasized  Continue Topamax 50 mg twice daily  Will assess metabolic panel to rule out electrolyte or renal function changes  Return in 3 months

## 2025-01-18 LAB
ANION GAP SERPL CALCULATED.3IONS-SCNC: 10 MMOL/L (ref 3–16)
BUN SERPL-MCNC: 16 MG/DL (ref 7–20)
CALCIUM SERPL-MCNC: 9.6 MG/DL (ref 8.3–10.6)
CHLORIDE SERPL-SCNC: 107 MMOL/L (ref 99–110)
CO2 SERPL-SCNC: 24 MMOL/L (ref 21–32)
CREAT SERPL-MCNC: 0.9 MG/DL (ref 0.6–1.1)
GFR SERPLBLD CREATININE-BSD FMLA CKD-EPI: 78 ML/MIN/{1.73_M2}
GLUCOSE SERPL-MCNC: 70 MG/DL (ref 70–99)
POTASSIUM SERPL-SCNC: 4 MMOL/L (ref 3.5–5.1)
SODIUM SERPL-SCNC: 141 MMOL/L (ref 136–145)

## 2025-03-25 RX ORDER — CITALOPRAM HYDROBROMIDE 40 MG/1
40 TABLET ORAL DAILY
Qty: 90 TABLET | Refills: 0 | Status: SHIPPED | OUTPATIENT
Start: 2025-03-25

## 2025-03-25 NOTE — TELEPHONE ENCOUNTER
Refill Request     Last Seen: 1/17/2025    Last Written: 12/26/2024    Next Appointment:   No future appointments.          Requested Prescriptions     Pending Prescriptions Disp Refills    citalopram (CELEXA) 40 MG tablet 90 tablet 0     Sig: Take 1 tablet by mouth daily

## 2025-07-14 ENCOUNTER — OFFICE VISIT (OUTPATIENT)
Dept: PRIMARY CARE CLINIC | Age: 50
End: 2025-07-14

## 2025-07-14 VITALS
HEART RATE: 96 BPM | DIASTOLIC BLOOD PRESSURE: 72 MMHG | OXYGEN SATURATION: 98 % | BODY MASS INDEX: 30.63 KG/M2 | SYSTOLIC BLOOD PRESSURE: 98 MMHG | WEIGHT: 187 LBS

## 2025-07-14 DIAGNOSIS — J45.20 MILD INTERMITTENT EXTRINSIC ASTHMA WITHOUT COMPLICATION: ICD-10-CM

## 2025-07-14 DIAGNOSIS — E78.5 HYPERLIPIDEMIA, UNSPECIFIED HYPERLIPIDEMIA TYPE: ICD-10-CM

## 2025-07-14 DIAGNOSIS — Z13.1 SCREENING FOR DIABETES MELLITUS: ICD-10-CM

## 2025-07-14 DIAGNOSIS — F32.A ANXIETY AND DEPRESSION: ICD-10-CM

## 2025-07-14 DIAGNOSIS — G43.109 VERTIGINOUS MIGRAINE: Primary | ICD-10-CM

## 2025-07-14 DIAGNOSIS — F41.9 ANXIETY AND DEPRESSION: ICD-10-CM

## 2025-07-14 RX ORDER — MONTELUKAST SODIUM 10 MG/1
10 TABLET ORAL NIGHTLY
Qty: 90 TABLET | Refills: 1 | Status: SHIPPED | OUTPATIENT
Start: 2025-07-14

## 2025-07-14 RX ORDER — CITALOPRAM HYDROBROMIDE 40 MG/1
40 TABLET ORAL DAILY
Qty: 90 TABLET | Refills: 0 | Status: SHIPPED | OUTPATIENT
Start: 2025-07-14

## 2025-07-14 RX ORDER — ALBUTEROL SULFATE 90 UG/1
2 INHALANT RESPIRATORY (INHALATION) EVERY 6 HOURS PRN
Qty: 8.5 G | Refills: 2 | Status: SHIPPED | OUTPATIENT
Start: 2025-07-14

## 2025-07-14 SDOH — ECONOMIC STABILITY: FOOD INSECURITY: WITHIN THE PAST 12 MONTHS, THE FOOD YOU BOUGHT JUST DIDN'T LAST AND YOU DIDN'T HAVE MONEY TO GET MORE.: NEVER TRUE

## 2025-07-14 SDOH — ECONOMIC STABILITY: FOOD INSECURITY: WITHIN THE PAST 12 MONTHS, YOU WORRIED THAT YOUR FOOD WOULD RUN OUT BEFORE YOU GOT MONEY TO BUY MORE.: NEVER TRUE

## 2025-07-14 ASSESSMENT — PATIENT HEALTH QUESTIONNAIRE - PHQ9
7. TROUBLE CONCENTRATING ON THINGS, SUCH AS READING THE NEWSPAPER OR WATCHING TELEVISION: NOT AT ALL
5. POOR APPETITE OR OVEREATING: NOT AT ALL
1. LITTLE INTEREST OR PLEASURE IN DOING THINGS: NOT AT ALL
SUM OF ALL RESPONSES TO PHQ QUESTIONS 1-9: 0
3. TROUBLE FALLING OR STAYING ASLEEP: NOT AT ALL
9. THOUGHTS THAT YOU WOULD BE BETTER OFF DEAD, OR OF HURTING YOURSELF: NOT AT ALL
SUM OF ALL RESPONSES TO PHQ QUESTIONS 1-9: 0
6. FEELING BAD ABOUT YOURSELF - OR THAT YOU ARE A FAILURE OR HAVE LET YOURSELF OR YOUR FAMILY DOWN: NOT AT ALL
SUM OF ALL RESPONSES TO PHQ QUESTIONS 1-9: 0
SUM OF ALL RESPONSES TO PHQ QUESTIONS 1-9: 0
10. IF YOU CHECKED OFF ANY PROBLEMS, HOW DIFFICULT HAVE THESE PROBLEMS MADE IT FOR YOU TO DO YOUR WORK, TAKE CARE OF THINGS AT HOME, OR GET ALONG WITH OTHER PEOPLE: NOT DIFFICULT AT ALL
4. FEELING TIRED OR HAVING LITTLE ENERGY: NOT AT ALL
8. MOVING OR SPEAKING SO SLOWLY THAT OTHER PEOPLE COULD HAVE NOTICED. OR THE OPPOSITE, BEING SO FIGETY OR RESTLESS THAT YOU HAVE BEEN MOVING AROUND A LOT MORE THAN USUAL: NOT AT ALL
2. FEELING DOWN, DEPRESSED OR HOPELESS: NOT AT ALL

## 2025-07-14 NOTE — PROGRESS NOTES
PROGRESS NOTE   [facility]                                  [address], [address], [facility] [address]         Phone: [phone]    Date of Service:  7/14/2025     Patient ID: Star Arzate is a 50 y.o. female      Subjective:     CC: Med check    HPI  Patient is 50 year-old female with pmh of migraines, asthma,  and anxiety/depression who presents for med check,    Migraines  - Patient states that her migraines have overall improved since starting her Topamax. She states that she has had two breakthrough migraines since the last time she was seen in office. Her most recent migraine was last night and states she feels a little tired today as a result. Both times patient did not have sumatriptan on hand and used ibuprofen/Tylenol for migraine.  - migraine preceded vertigo  - Patient is currently taking Topamax 50 mg BID. She is tolerating medication.    Asthma  - Patient has noticed air quality has gotten worse over the past month. As a result she has felt some chest tightness that she normally has when her asthma starts to flare.  - Patient had been using albuterol as needed for flares but she has been out of her albuterol, so she has used her 's inhaler about 4 times in the last month.  - Patient reports no hospitalizations for asthma exacerbations    Anxiety/Depression  - Patient is currently on Celexa 40 mg daily. Tolerating medication with no issues.  - Patient states her mood is good. She endorses some tiredness today due to migraine episode last night, but she otherwise feels good with no anxiety. No SI/HI.      ROS:    Review of Systems   Constitutional: Negative.    HENT: Negative.     Respiratory:  Positive for chest tightness and wheezing. Negative for cough and shortness of breath.    Gastrointestinal: Negative.    Genitourinary: Negative.    Musculoskeletal: Negative.    Neurological:  Positive for headaches.  Yes

## 2025-07-16 ASSESSMENT — ENCOUNTER SYMPTOMS
WHEEZING: 1
COUGH: 0
GASTROINTESTINAL NEGATIVE: 1
CHEST TIGHTNESS: 1
SHORTNESS OF BREATH: 0